# Patient Record
Sex: MALE | Race: BLACK OR AFRICAN AMERICAN | NOT HISPANIC OR LATINO
[De-identification: names, ages, dates, MRNs, and addresses within clinical notes are randomized per-mention and may not be internally consistent; named-entity substitution may affect disease eponyms.]

---

## 2017-02-28 ENCOUNTER — CHARTING TRANS (OUTPATIENT)
Dept: OTHER | Age: 57
End: 2017-02-28

## 2017-02-28 ASSESSMENT — PAIN SCALES - GENERAL: PAINLEVEL_OUTOF10: 0

## 2017-03-30 ENCOUNTER — LAB SERVICES (OUTPATIENT)
Dept: OTHER | Age: 57
End: 2017-03-30

## 2017-04-02 LAB
ALBUMIN SERPL-MCNC: 3.9 G/DL (ref 3.6–5.1)
ALBUMIN/GLOB SERPL: 1.2 (ref 1–2.4)
ALP SERPL-CCNC: 80 UNITS/L (ref 45–117)
ALT SERPL-CCNC: 63 UNITS/L
ANION GAP SERPL CALC-SCNC: 14 MMOL/L (ref 10–20)
APPEARANCE UR: CLEAR
AST SERPL-CCNC: 31 UNITS/L
BACTERIA #/AREA URNS HPF: NORMAL /HPF
BASOPHILS # BLD: 0.1 K/MCL (ref 0–0.3)
BASOPHILS NFR BLD: 1 %
BILIRUB SERPL-MCNC: 0.8 MG/DL (ref 0.2–1)
BILIRUB UR QL: NEGATIVE
BUN SERPL-MCNC: 12 MG/DL (ref 6–20)
BUN/CREAT SERPL: 14 (ref 7–25)
CALCIUM SERPL-MCNC: 9 MG/DL (ref 8.4–10.2)
CHLORIDE SERPL-SCNC: 107 MMOL/L (ref 98–107)
CHOLEST SERPL-MCNC: 145 MG/DL
CHOLEST/HDLC SERPL: 3.9
CK SERPL-CCNC: 253 UNITS/L (ref 39–308)
CO2 SERPL-SCNC: 25 MMOL/L (ref 21–32)
COLOR UR: YELLOW
CREAT SERPL-MCNC: 0.85 MG/DL (ref 0.67–1.17)
DIFFERENTIAL METHOD BLD: ABNORMAL
EOSINOPHIL # BLD: 0.3 K/MCL (ref 0.1–0.5)
EOSINOPHIL NFR BLD: 3 %
ERYTHROCYTE [DISTWIDTH] IN BLOOD: 14.2 % (ref 11–15)
GLOBULIN SER-MCNC: 3.3 G/DL (ref 2–4)
GLUCOSE SERPL-MCNC: 96 MG/DL (ref 65–99)
GLUCOSE UR-MCNC: NEGATIVE MG/DL
HBA1C MFR BLD: 6.4 % (ref 4.5–5.6)
HDLC SERPL-MCNC: 37 MG/DL
HEMATOCRIT: 42 % (ref 39–51)
HEMOGLOBIN: 14.1 G/DL (ref 13–17)
HYALINE CASTS #/AREA URNS LPF: NORMAL /LPF (ref 0–5)
KETONES UR-MCNC: NEGATIVE MG/DL
LDLC SERPL CALC-MCNC: 83 MG/DL
LENGTH OF FAST TIME PATIENT: ABNORMAL HRS
LENGTH OF FAST TIME PATIENT: NORMAL HRS
LYMPHOCYTES # BLD: 2.1 K/MCL (ref 1–4)
LYMPHOCYTES NFR BLD: 22 %
MEAN CORPUSCULAR HEMOGLOBIN: 30.9 PG (ref 26–34)
MEAN CORPUSCULAR HGB CONC: 33.6 G/DL (ref 32–36.5)
MEAN CORPUSCULAR VOLUME: 92.1 FL (ref 78–100)
MONOCYTES # BLD: 1 K/MCL (ref 0.3–0.9)
MONOCYTES NFR BLD: 10 %
MUCOUS THREADS URNS QL MICRO: PRESENT
NEUTROPHILS # BLD: 6.1 K/MCL (ref 1.8–7.7)
NEUTROPHILS NFR BLD: 64 %
NITRITE UR QL: NEGATIVE
NONHDLC SERPL-MCNC: 108 MG/DL
PH UR: 5 UNITS (ref 5–7)
PLATELET COUNT: 264 K/MCL (ref 140–450)
POTASSIUM SERPL-SCNC: 4.3 MMOL/L (ref 3.4–5.1)
PROT UR QL: NEGATIVE MG/DL
PSA SERPL-MCNC: 15.13 NG/ML
RBC #/AREA URNS HPF: NORMAL /HPF (ref 0–3)
RBC-URINE: NEGATIVE
RED CELL COUNT: 4.56 MIL/MCL (ref 4.5–5.9)
SODIUM SERPL-SCNC: 142 MMOL/L (ref 135–145)
SP GR UR: 1.02 (ref 1–1.03)
SPECIMEN SOURCE: NORMAL
SQUAMOUS #/AREA URNS HPF: NORMAL /HPF (ref 0–5)
TOTAL PROTEIN: 7.2 G/DL (ref 6.4–8.2)
TRIGL SERPL-MCNC: 125 MG/DL
TSH SERPL-ACNC: 1.01 MCUNITS/ML (ref 0.35–5)
URIC ACID: 6.1 MG/DL (ref 3.5–7.2)
UROBILINOGEN UR QL: 0.2 MG/DL (ref 0–1)
WBC #/AREA URNS HPF: NORMAL /HPF (ref 0–5)
WBC-URINE: NEGATIVE
WHITE BLOOD COUNT: 9.4 K/MCL (ref 4.2–11)

## 2017-04-05 ENCOUNTER — IMAGING SERVICES (OUTPATIENT)
Dept: OTHER | Age: 57
End: 2017-04-05

## 2017-04-05 ENCOUNTER — HOSPITAL (OUTPATIENT)
Dept: OTHER | Age: 57
End: 2017-04-05

## 2017-05-11 ENCOUNTER — CHARTING TRANS (OUTPATIENT)
Dept: OTHER | Age: 57
End: 2017-05-11

## 2017-05-11 ASSESSMENT — PAIN SCALES - GENERAL: PAINLEVEL_OUTOF10: 0

## 2017-07-17 ENCOUNTER — CHARTING TRANS (OUTPATIENT)
Dept: OTHER | Age: 57
End: 2017-07-17

## 2017-07-17 ASSESSMENT — PAIN SCALES - GENERAL: PAINLEVEL_OUTOF10: 0

## 2017-10-16 ENCOUNTER — CHARTING TRANS (OUTPATIENT)
Dept: OTHER | Age: 57
End: 2017-10-16

## 2017-10-16 ASSESSMENT — PAIN SCALES - GENERAL: PAINLEVEL_OUTOF10: 0

## 2017-10-26 ENCOUNTER — LAB SERVICES (OUTPATIENT)
Dept: OTHER | Age: 57
End: 2017-10-26

## 2017-11-06 LAB
ACE SERPL-CCNC: 46 U/L
ALBUMIN SERPL-MCNC: 3.9 G/DL (ref 3.6–5.1)
ALBUMIN/GLOB SERPL: 1.1 (ref 1–2.4)
ALP SERPL-CCNC: 94 UNITS/L (ref 45–117)
ALT SERPL-CCNC: 64 UNITS/L
ANA PAT SER IF-IMP: ABNORMAL
ANA SER QL IA: POSITIVE
ANA TITR SER IF: 320 1:NN
ANION GAP SERPL CALC-SCNC: 12 MMOL/L (ref 10–20)
APPEARANCE UR: CLEAR
AST SERPL-CCNC: 33 UNITS/L
BACTERIA #/AREA URNS HPF: NORMAL /HPF
BILIRUB SERPL-MCNC: 0.3 MG/DL (ref 0.2–1)
BILIRUB UR QL: NEGATIVE
BLANK1: NORMAL
BUN SERPL-MCNC: 10 MG/DL (ref 6–20)
BUN/CREAT SERPL: 11 (ref 7–25)
CALCIUM SERPL-MCNC: 9.6 MG/DL (ref 8.4–10.2)
CENTROMERE AB SER-ACNC: <0.2 AI (ref 0–0.9)
CHLORIDE SERPL-SCNC: 107 MMOL/L (ref 98–107)
CHOLEST SERPL-MCNC: 138 MG/DL
CHOLEST/HDLC SERPL: 3.3
CHROMATIN AB SERPL-ACNC: <0.2 AI (ref 0–0.9)
CK SERPL-CCNC: 255 UNITS/L (ref 39–308)
CO2 SERPL-SCNC: 28 MMOL/L (ref 21–32)
COLOR UR: YELLOW
CREAT SERPL-MCNC: 0.89 MG/DL (ref 0.67–1.17)
CRP SERPL-MCNC: <0.3 MG/DL
DATE OF ANALYSIS SPEC: NORMAL
DSDNA AB SER IA-ACNC: 10 IUNITS/ML
ENA JO1 IGG SER-ACNC: <0.2 AI (ref 0–0.9)
ENA RNP IGG SER IA-ACNC: 0.3 AI (ref 0–0.9)
ENA SCL70 IGG SER QL: <0.2 AI (ref 0–0.9)
ENA SM IGG SER IA-ACNC: <0.2 AI (ref 0–0.9)
ENA SM+RNP IGG SER IA-ACNC: <0.2 AI (ref 0–0.9)
ENA SS-A AB SER IA-ACNC: <0.2 AI (ref 0–0.9)
ENA SS-B IGG SER IA-ACNC: <0.2 AI (ref 0–0.9)
ERYTHROCYTE [SEDIMENTATION RATE] IN BLOOD BY WESTERGREN METHOD: 12 MM/HR (ref 0–20)
GLOBULIN SER-MCNC: 3.6 G/DL (ref 2–4)
GLUCOSE SERPL-MCNC: 99 MG/DL (ref 65–99)
GLUCOSE UR-MCNC: NEGATIVE MG/DL
HBA1C MFR BLD: 6.5 % (ref 4.5–5.6)
HDLC SERPL-MCNC: 42 MG/DL
HLA-B27 RELATED AG QL: NEGATIVE
HYALINE CASTS #/AREA URNS LPF: NORMAL /LPF (ref 0–5)
KETONES UR-MCNC: NEGATIVE MG/DL
LDLC SERPL CALC-MCNC: 69 MG/DL
LENGTH OF FAST TIME PATIENT: NORMAL HRS
LENGTH OF FAST TIME PATIENT: NORMAL HRS
MUCOUS THREADS URNS QL MICRO: PRESENT
NITRITE UR QL: NEGATIVE
NONHDLC SERPL-MCNC: 96 MG/DL
PERFORMING LAB MEDICAL DIRECTOR: NORMAL
PH UR: 5 UNITS (ref 5–7)
POTASSIUM SERPL-SCNC: 4.5 MMOL/L (ref 3.4–5.1)
PROT UR QL: NEGATIVE MG/DL
RBC #/AREA URNS HPF: NORMAL /HPF (ref 0–3)
RBC-URINE: NEGATIVE
RHEUMATOID FACT SER NEPH-ACNC: <10 UNITS/ML
RIBOSOMAL P IGG SER-ACNC: <0.2 AI (ref 0–0.9)
SERVICE CMNT-IMP: NORMAL
SODIUM SERPL-SCNC: 143 MMOL/L (ref 135–145)
SP GR UR: 1.01 (ref 1–1.03)
SPECIMEN SOURCE: NORMAL
SQUAMOUS #/AREA URNS HPF: NORMAL /HPF (ref 0–5)
TOTAL PROTEIN: 7.5 G/DL (ref 6.4–8.2)
TRIGL SERPL-MCNC: 133 MG/DL
TSH SERPL-ACNC: 1.09 MCUNITS/ML (ref 0.35–5)
URIC ACID: 6.6 MG/DL (ref 3.5–7.2)
UROBILINOGEN UR QL: 0.2 MG/DL (ref 0–1)
WBC #/AREA URNS HPF: NORMAL /HPF (ref 0–5)
WBC-URINE: NEGATIVE

## 2017-12-07 ENCOUNTER — CHARTING TRANS (OUTPATIENT)
Dept: OTHER | Age: 57
End: 2017-12-07

## 2017-12-07 ASSESSMENT — PAIN SCALES - GENERAL: PAINLEVEL_OUTOF10: 0

## 2018-01-24 ENCOUNTER — HOSPITAL (OUTPATIENT)
Dept: OTHER | Age: 58
End: 2018-01-24

## 2018-02-06 ENCOUNTER — CHARTING TRANS (OUTPATIENT)
Dept: OTHER | Age: 58
End: 2018-02-06

## 2018-02-06 ENCOUNTER — HOSPITAL (OUTPATIENT)
Dept: OTHER | Age: 58
End: 2018-02-06
Attending: INTERNAL MEDICINE

## 2018-02-16 LAB — COLONOSCOPY STUDY: NORMAL

## 2018-06-26 ENCOUNTER — CHARTING TRANS (OUTPATIENT)
Dept: OTHER | Age: 58
End: 2018-06-26

## 2018-06-26 ASSESSMENT — PAIN SCALES - GENERAL: PAINLEVEL_OUTOF10: 0

## 2018-10-08 ENCOUNTER — CHARTING TRANS (OUTPATIENT)
Dept: OTHER | Age: 58
End: 2018-10-08

## 2018-10-08 ASSESSMENT — PAIN SCALES - GENERAL: PAINLEVEL_OUTOF10: 0

## 2018-10-31 VITALS
RESPIRATION RATE: 16 BRPM | WEIGHT: 262 LBS | BODY MASS INDEX: 38.8 KG/M2 | TEMPERATURE: 97.7 F | HEART RATE: 85 BPM | HEIGHT: 69 IN | OXYGEN SATURATION: 96 % | SYSTOLIC BLOOD PRESSURE: 142 MMHG | DIASTOLIC BLOOD PRESSURE: 91 MMHG

## 2018-11-02 VITALS
SYSTOLIC BLOOD PRESSURE: 150 MMHG | HEART RATE: 84 BPM | DIASTOLIC BLOOD PRESSURE: 100 MMHG | BODY MASS INDEX: 39.4 KG/M2 | TEMPERATURE: 96.8 F | HEIGHT: 69 IN | OXYGEN SATURATION: 95 % | WEIGHT: 265.99 LBS | RESPIRATION RATE: 16 BRPM

## 2018-11-02 VITALS
RESPIRATION RATE: 16 BRPM | HEIGHT: 69 IN | SYSTOLIC BLOOD PRESSURE: 166 MMHG | OXYGEN SATURATION: 100 % | BODY MASS INDEX: 38.89 KG/M2 | WEIGHT: 262.57 LBS | TEMPERATURE: 97.3 F | DIASTOLIC BLOOD PRESSURE: 96 MMHG | HEART RATE: 85 BPM

## 2018-11-03 VITALS
HEART RATE: 84 BPM | TEMPERATURE: 98 F | HEIGHT: 69 IN | DIASTOLIC BLOOD PRESSURE: 94 MMHG | WEIGHT: 259.4 LBS | SYSTOLIC BLOOD PRESSURE: 159 MMHG | RESPIRATION RATE: 16 BRPM | BODY MASS INDEX: 38.42 KG/M2 | OXYGEN SATURATION: 98 %

## 2018-11-03 VITALS
BODY MASS INDEX: 38.66 KG/M2 | DIASTOLIC BLOOD PRESSURE: 70 MMHG | HEIGHT: 69 IN | HEART RATE: 76 BPM | TEMPERATURE: 96.8 F | RESPIRATION RATE: 16 BRPM | SYSTOLIC BLOOD PRESSURE: 120 MMHG | WEIGHT: 261.01 LBS | OXYGEN SATURATION: 95 %

## 2018-11-05 VITALS
DIASTOLIC BLOOD PRESSURE: 82 MMHG | TEMPERATURE: 96.2 F | OXYGEN SATURATION: 98 % | BODY MASS INDEX: 38.66 KG/M2 | WEIGHT: 261.01 LBS | HEART RATE: 72 BPM | HEIGHT: 69 IN | RESPIRATION RATE: 18 BRPM | SYSTOLIC BLOOD PRESSURE: 120 MMHG

## 2018-11-21 ENCOUNTER — LAB SERVICES (OUTPATIENT)
Dept: OTHER | Age: 58
End: 2018-11-21

## 2018-11-22 LAB
ALBUMIN SERPL-MCNC: 3.9 G/DL (ref 3.6–5.1)
ALBUMIN/GLOB SERPL: 1.2 {RATIO} (ref 1–2.4)
ALP SERPL-CCNC: 99 UNITS/L (ref 45–117)
ALT SERPL-CCNC: 80 UNITS/L
ANION GAP SERPL CALC-SCNC: 13 MMOL/L (ref 10–20)
AST SERPL-CCNC: 40 UNITS/L
BILIRUB SERPL-MCNC: 0.5 MG/DL (ref 0.2–1)
BUN SERPL-MCNC: 10 MG/DL (ref 6–20)
BUN/CREAT SERPL: 12 (ref 7–25)
CALCIUM SERPL-MCNC: 8.7 MG/DL (ref 8.4–10.2)
CHLORIDE SERPL-SCNC: 107 MMOL/L (ref 98–107)
CHOLEST SERPL-MCNC: 112 MG/DL
CHOLEST/HDLC SERPL: 3.5 {RATIO}
CO2 SERPL-SCNC: 28 MMOL/L (ref 21–32)
CREAT SERPL-MCNC: 0.8 MG/DL (ref 0.67–1.17)
GLOBULIN SER-MCNC: 3.3 G/DL (ref 2–4)
GLUCOSE SERPL-MCNC: 105 MG/DL (ref 65–99)
HBA1C MFR BLD: 6.6 % (ref 4.5–5.6)
HDLC SERPL-MCNC: 32 MG/DL
LDLC SERPL CALC-MCNC: 56 MG/DL
LENGTH OF FAST TIME PATIENT: ABNORMAL HRS
LENGTH OF FAST TIME PATIENT: ABNORMAL HRS
NONHDLC SERPL-MCNC: 80 MG/DL
POTASSIUM SERPL-SCNC: 4.1 MMOL/L (ref 3.4–5.1)
PROT SERPL-MCNC: 7.2 G/DL (ref 6.4–8.2)
PSA FREE MFR SERPL: 2.98 %
PSA FREE SERPL-MCNC: 0.52 NG/ML
PSA SERPL-MCNC: 17.3 NG/ML
SODIUM SERPL-SCNC: 144 MMOL/L (ref 135–145)
TRIGL SERPL-MCNC: 122 MG/DL
TSH SERPL-ACNC: 0.49 MCUNITS/ML (ref 0.35–5)

## 2018-11-27 VITALS
WEIGHT: 261.23 LBS | SYSTOLIC BLOOD PRESSURE: 140 MMHG | BODY MASS INDEX: 38.69 KG/M2 | DIASTOLIC BLOOD PRESSURE: 87 MMHG | TEMPERATURE: 98.4 F | HEART RATE: 85 BPM | HEIGHT: 69 IN | OXYGEN SATURATION: 97 % | RESPIRATION RATE: 18 BRPM

## 2018-12-05 ENCOUNTER — TELEPHONE (OUTPATIENT)
Dept: SCHEDULING | Age: 58
End: 2018-12-05

## 2018-12-14 DIAGNOSIS — I10 ESSENTIAL HYPERTENSION: Primary | ICD-10-CM

## 2018-12-14 DIAGNOSIS — Z72.0 TOBACCO USE: ICD-10-CM

## 2018-12-18 RX ORDER — TRIAMTERENE AND HYDROCHLOROTHIAZIDE 37.5; 25 MG/1; MG/1
1 CAPSULE ORAL DAILY
Qty: 90 CAPSULE | Refills: 3 | Status: SHIPPED | OUTPATIENT
Start: 2018-12-18 | End: 2019-12-18

## 2018-12-18 RX ORDER — VARENICLINE TARTRATE 1 MG/1
TABLET, FILM COATED ORAL EVERY 12 HOURS
COMMUNITY
Start: 2018-10-08 | End: 2018-12-18 | Stop reason: SDUPTHER

## 2018-12-18 RX ORDER — VARENICLINE TARTRATE 1 MG/1
1 TABLET, FILM COATED ORAL EVERY 12 HOURS
Qty: 56 TABLET | Refills: 11 | Status: SHIPPED | OUTPATIENT
Start: 2019-01-18 | End: 2020-01-18

## 2018-12-18 RX ORDER — VARENICLINE TARTRATE 1 MG/1
1 TABLET, FILM COATED ORAL EVERY 12 HOURS
Qty: 56 TABLET | Refills: 11 | Status: SHIPPED | OUTPATIENT
Start: 2019-01-18 | End: 2018-12-18 | Stop reason: SDUPTHER

## 2018-12-18 RX ORDER — TRIAMTERENE AND HYDROCHLOROTHIAZIDE 37.5; 25 MG/1; MG/1
CAPSULE ORAL
COMMUNITY
Start: 2018-06-20 | End: 2018-12-18 | Stop reason: SDUPTHER

## 2018-12-24 ENCOUNTER — TELEPHONE (OUTPATIENT)
Dept: SCHEDULING | Age: 58
End: 2018-12-24

## 2018-12-24 DIAGNOSIS — I10 ESSENTIAL HYPERTENSION: Primary | ICD-10-CM

## 2018-12-24 DIAGNOSIS — I10 HYPERTENSION, UNSPECIFIED TYPE: ICD-10-CM

## 2018-12-24 RX ORDER — ATENOLOL 100 MG/1
1 TABLET ORAL DAILY
COMMUNITY
Start: 2018-03-15 | End: 2019-02-07 | Stop reason: SDUPTHER

## 2019-01-01 ENCOUNTER — EXTERNAL RECORD (OUTPATIENT)
Dept: HEALTH INFORMATION MANAGEMENT | Facility: OTHER | Age: 59
End: 2019-01-01

## 2019-02-01 ENCOUNTER — TELEPHONE (OUTPATIENT)
Dept: SCHEDULING | Age: 59
End: 2019-02-01

## 2019-02-04 ENCOUNTER — HOSPITAL (OUTPATIENT)
Dept: OTHER | Age: 59
End: 2019-02-04

## 2019-02-05 RX ORDER — CITALOPRAM 40 MG/1
TABLET ORAL
COMMUNITY
Start: 2018-03-15 | End: 2021-01-11 | Stop reason: DRUGHIGH

## 2019-02-05 RX ORDER — AMLODIPINE BESYLATE 10 MG/1
TABLET ORAL
COMMUNITY
Start: 2018-06-30 | End: 2019-04-18 | Stop reason: SDUPTHER

## 2019-02-05 RX ORDER — LEVOFLOXACIN 750 MG/1
TABLET, FILM COATED ORAL
COMMUNITY
Start: 2018-10-08 | End: 2019-02-07 | Stop reason: ALTCHOICE

## 2019-02-05 RX ORDER — LORATADINE 10 MG/1
TABLET ORAL
COMMUNITY
Start: 2018-10-08 | End: 2019-10-08

## 2019-02-05 RX ORDER — ATORVASTATIN CALCIUM 80 MG/1
TABLET, FILM COATED ORAL
COMMUNITY
Start: 2018-06-30 | End: 2019-04-18 | Stop reason: SDUPTHER

## 2019-02-05 RX ORDER — MONTELUKAST SODIUM 10 MG/1
TABLET ORAL
COMMUNITY
Start: 2018-10-08 | End: 2019-10-08

## 2019-02-05 RX ORDER — NICOTINE 21 MG/24HR
PATCH, TRANSDERMAL 24 HOURS TRANSDERMAL
COMMUNITY
Start: 2018-10-08 | End: 2019-10-08

## 2019-02-05 RX ORDER — PREDNISONE 50 MG/1
TABLET ORAL
COMMUNITY
Start: 2018-10-08 | End: 2019-02-07 | Stop reason: SDUPTHER

## 2019-02-05 RX ORDER — FLUTICASONE PROPIONATE 50 MCG
SPRAY, SUSPENSION (ML) NASAL
COMMUNITY
Start: 2018-10-08 | End: 2019-10-08

## 2019-02-05 RX ORDER — TAMSULOSIN HYDROCHLORIDE 0.4 MG/1
CAPSULE ORAL
COMMUNITY
Start: 2018-10-08 | End: 2019-09-27 | Stop reason: SDUPTHER

## 2019-02-05 RX ORDER — QUETIAPINE FUMARATE 25 MG/1
TABLET, FILM COATED ORAL
COMMUNITY
Start: 2018-10-08 | End: 2019-10-08

## 2019-02-06 ENCOUNTER — TELEPHONE (OUTPATIENT)
Dept: SCHEDULING | Age: 59
End: 2019-02-06

## 2019-02-07 ENCOUNTER — OFFICE VISIT (OUTPATIENT)
Dept: FAMILY MEDICINE | Age: 59
End: 2019-02-07

## 2019-02-07 DIAGNOSIS — G89.29 CHRONIC PAIN OF LEFT KNEE: ICD-10-CM

## 2019-02-07 DIAGNOSIS — I10 ESSENTIAL HYPERTENSION: Primary | ICD-10-CM

## 2019-02-07 DIAGNOSIS — Z72.0 TOBACCO USE: ICD-10-CM

## 2019-02-07 DIAGNOSIS — C61 PROSTATE CANCER (CMD): ICD-10-CM

## 2019-02-07 DIAGNOSIS — J01.91 ACUTE RECURRENT SINUSITIS, UNSPECIFIED LOCATION: ICD-10-CM

## 2019-02-07 DIAGNOSIS — J32.9 CHRONIC SINUSITIS, UNSPECIFIED LOCATION: ICD-10-CM

## 2019-02-07 DIAGNOSIS — M25.562 CHRONIC PAIN OF LEFT KNEE: ICD-10-CM

## 2019-02-07 PROBLEM — R35.0 URINARY FREQUENCY: Status: ACTIVE | Noted: 2018-06-26

## 2019-02-07 PROBLEM — K63.5 BENIGN COLON POLYP: Status: ACTIVE | Noted: 2017-02-28

## 2019-02-07 PROBLEM — B36.0 TINEA VERSICOLOR: Status: ACTIVE | Noted: 2017-07-18

## 2019-02-07 PROBLEM — R73.09 ELEVATED HEMOGLOBIN A1C: Status: ACTIVE | Noted: 2017-07-17

## 2019-02-07 PROBLEM — B96.89 SINUSITIS, BACTERIAL: Status: ACTIVE | Noted: 2018-10-08

## 2019-02-07 PROBLEM — F10.20 ALCOHOL DEPENDENCE (CMD): Status: ACTIVE | Noted: 2017-12-07

## 2019-02-07 PROBLEM — F32.9 MAJOR DEPRESSIVE DISORDER WITH CURRENT ACTIVE EPISODE: Status: ACTIVE | Noted: 2017-07-18

## 2019-02-07 PROCEDURE — 3077F SYST BP >= 140 MM HG: CPT | Performed by: FAMILY MEDICINE

## 2019-02-07 PROCEDURE — 20610 DRAIN/INJ JOINT/BURSA W/O US: CPT | Performed by: FAMILY MEDICINE

## 2019-02-07 PROCEDURE — 3080F DIAST BP >= 90 MM HG: CPT | Performed by: FAMILY MEDICINE

## 2019-02-07 PROCEDURE — 99214 OFFICE O/P EST MOD 30 MIN: CPT | Performed by: FAMILY MEDICINE

## 2019-02-07 RX ORDER — PREDNISONE 50 MG/1
50 TABLET ORAL DAILY
Qty: 5 TABLET | Refills: 0 | Status: SHIPPED | OUTPATIENT
Start: 2019-02-07 | End: 2020-02-07

## 2019-02-07 RX ORDER — ATENOLOL 100 MG/1
100 TABLET ORAL DAILY
Qty: 90 TABLET | Refills: 3 | Status: SHIPPED | OUTPATIENT
Start: 2019-02-07 | End: 2020-02-07

## 2019-02-07 RX ORDER — GUAIFENESIN 600 MG/1
600 TABLET, EXTENDED RELEASE ORAL 2 TIMES DAILY
Qty: 30 TABLET | Refills: 0 | Status: SHIPPED | OUTPATIENT
Start: 2019-02-07 | End: 2021-12-02 | Stop reason: ALTCHOICE

## 2019-02-07 RX ORDER — TRIAMCINOLONE ACETONIDE 40 MG/ML
40 INJECTION, SUSPENSION INTRA-ARTICULAR; INTRAMUSCULAR ONCE
Status: COMPLETED | OUTPATIENT
Start: 2019-02-07 | End: 2019-02-07

## 2019-02-07 RX ORDER — ALBUTEROL SULFATE 90 UG/1
1-2 AEROSOL, METERED RESPIRATORY (INHALATION)
COMMUNITY
Start: 2018-09-05 | End: 2021-01-11 | Stop reason: ALTCHOICE

## 2019-02-07 RX ADMIN — TRIAMCINOLONE ACETONIDE 40 MG: 40 INJECTION, SUSPENSION INTRA-ARTICULAR; INTRAMUSCULAR at 18:46

## 2019-02-07 SDOH — HEALTH STABILITY: MENTAL HEALTH
STRESS IS WHEN SOMEONE FEELS TENSE, NERVOUS, ANXIOUS, OR CAN'T SLEEP AT NIGHT BECAUSE THEIR MIND IS TROUBLED. HOW STRESSED ARE YOU?: NOT AT ALL

## 2019-02-07 SDOH — HEALTH STABILITY: MENTAL HEALTH: HOW OFTEN DO YOU HAVE A DRINK CONTAINING ALCOHOL?: NEVER

## 2019-02-07 SDOH — HEALTH STABILITY: PHYSICAL HEALTH: ON AVERAGE, HOW MANY DAYS PER WEEK DO YOU ENGAGE IN MODERATE TO STRENUOUS EXERCISE (LIKE A BRISK WALK)?: 3 DAYS

## 2019-02-07 SDOH — HEALTH STABILITY: PHYSICAL HEALTH: ON AVERAGE, HOW MANY MINUTES DO YOU ENGAGE IN EXERCISE AT THIS LEVEL?: 90 MIN

## 2019-02-07 ASSESSMENT — ENCOUNTER SYMPTOMS
SEIZURES: 0
PHOTOPHOBIA: 0
ANAL BLEEDING: 0
CONSTIPATION: 0
BRUISES/BLEEDS EASILY: 0
LIGHT-HEADEDNESS: 1
HEADACHES: 1
FEVER: 0
AGITATION: 0
NAUSEA: 0
SLEEP DISTURBANCE: 0
ABDOMINAL DISTENTION: 0
SINUS PRESSURE: 1
APPETITE CHANGE: 0
SINUS PAIN: 1
NERVOUS/ANXIOUS: 0
COUGH: 1
DIARRHEA: 0
ADENOPATHY: 0
TREMORS: 0
BACK PAIN: 0
ABDOMINAL PAIN: 0
WHEEZING: 0
BLOOD IN STOOL: 0
SHORTNESS OF BREATH: 0
WEAKNESS: 0
ACTIVITY CHANGE: 0
RHINORRHEA: 1
DIZZINESS: 1
EYE DISCHARGE: 0
FATIGUE: 1
SORE THROAT: 1
FACIAL SWELLING: 0
WOUND: 0

## 2019-02-07 ASSESSMENT — PATIENT HEALTH QUESTIONNAIRE - PHQ9
2. FEELING DOWN, DEPRESSED OR HOPELESS: NOT AT ALL
SUM OF ALL RESPONSES TO PHQ9 QUESTIONS 1 AND 2: 0
1. LITTLE INTEREST OR PLEASURE IN DOING THINGS: NOT AT ALL
SUM OF ALL RESPONSES TO PHQ9 QUESTIONS 1 AND 2: 0

## 2019-02-07 ASSESSMENT — PAIN SCALES - GENERAL: PAINLEVEL: 7-8

## 2019-04-03 RX ORDER — CITALOPRAM 40 MG/1
TABLET ORAL
Qty: 30 TABLET | Refills: 0 | Status: SHIPPED | OUTPATIENT
Start: 2019-04-03 | End: 2019-05-06 | Stop reason: SDUPTHER

## 2019-04-18 RX ORDER — AMLODIPINE BESYLATE 10 MG/1
TABLET ORAL
Qty: 90 TABLET | Refills: 2 | Status: SHIPPED | OUTPATIENT
Start: 2019-04-18 | End: 2020-02-09 | Stop reason: SDUPTHER

## 2019-04-18 RX ORDER — ATORVASTATIN CALCIUM 80 MG/1
TABLET, FILM COATED ORAL
Qty: 90 TABLET | Refills: 2 | Status: SHIPPED | OUTPATIENT
Start: 2019-04-18 | End: 2019-12-25 | Stop reason: SDUPTHER

## 2019-05-08 RX ORDER — CITALOPRAM 40 MG/1
TABLET ORAL
Qty: 30 TABLET | Refills: 0 | Status: SHIPPED | OUTPATIENT
Start: 2019-05-08 | End: 2020-02-13 | Stop reason: SDUPTHER

## 2019-05-14 RX ORDER — CITALOPRAM 40 MG/1
TABLET ORAL
Qty: 30 TABLET | Refills: 6 | Status: SHIPPED | OUTPATIENT
Start: 2019-05-14 | End: 2021-01-11 | Stop reason: DRUGHIGH

## 2019-06-10 ENCOUNTER — TELEPHONE (OUTPATIENT)
Dept: SCHEDULING | Age: 59
End: 2019-06-10

## 2019-06-12 ENCOUNTER — OFFICE VISIT (OUTPATIENT)
Dept: FAMILY MEDICINE | Age: 59
End: 2019-06-12

## 2019-06-12 DIAGNOSIS — M25.562 CHRONIC PAIN OF LEFT KNEE: Primary | ICD-10-CM

## 2019-06-12 DIAGNOSIS — C61 PROSTATE CANCER (CMD): ICD-10-CM

## 2019-06-12 DIAGNOSIS — B35.1 TOENAIL FUNGUS: ICD-10-CM

## 2019-06-12 DIAGNOSIS — L60.0 IGTN (INGROWING TOE NAIL): ICD-10-CM

## 2019-06-12 DIAGNOSIS — R22.42 MASS OF LEFT FOOT: ICD-10-CM

## 2019-06-12 DIAGNOSIS — G89.29 CHRONIC PAIN OF LEFT KNEE: Primary | ICD-10-CM

## 2019-06-12 DIAGNOSIS — R74.8 ELEVATED LIVER ENZYMES: ICD-10-CM

## 2019-06-12 DIAGNOSIS — I10 ESSENTIAL HYPERTENSION: ICD-10-CM

## 2019-06-12 PROCEDURE — 3075F SYST BP GE 130 - 139MM HG: CPT | Performed by: FAMILY MEDICINE

## 2019-06-12 PROCEDURE — 99214 OFFICE O/P EST MOD 30 MIN: CPT | Performed by: FAMILY MEDICINE

## 2019-06-12 PROCEDURE — 3079F DIAST BP 80-89 MM HG: CPT | Performed by: FAMILY MEDICINE

## 2019-06-12 RX ORDER — CLOTRIMAZOLE AND BETAMETHASONE DIPROPIONATE 10; .64 MG/G; MG/G
CREAM TOPICAL 2 TIMES DAILY
Qty: 30 G | Refills: 0 | Status: SHIPPED | OUTPATIENT
Start: 2019-06-12 | End: 2020-06-24

## 2019-06-12 RX ORDER — LOSARTAN POTASSIUM 50 MG/1
50 TABLET ORAL DAILY
Qty: 90 TABLET | Refills: 3 | Status: SHIPPED | OUTPATIENT
Start: 2019-06-12 | End: 2019-10-14 | Stop reason: DRUGHIGH

## 2019-06-12 RX ORDER — TERBINAFINE HYDROCHLORIDE 250 MG/1
250 TABLET ORAL DAILY
Qty: 84 TABLET | Refills: 0 | Status: SHIPPED | OUTPATIENT
Start: 2019-06-12 | End: 2019-12-02 | Stop reason: SDUPTHER

## 2019-06-12 RX ORDER — CYCLOBENZAPRINE HCL 5 MG
5 TABLET ORAL
Qty: 90 TABLET | Refills: 3 | Status: SHIPPED | OUTPATIENT
Start: 2019-06-12 | End: 2021-12-02 | Stop reason: ALTCHOICE

## 2019-06-12 SDOH — HEALTH STABILITY: MENTAL HEALTH: HOW OFTEN DO YOU HAVE A DRINK CONTAINING ALCOHOL?: NEVER

## 2019-06-12 ASSESSMENT — PAIN SCALES - GENERAL: PAINLEVEL: 7-8

## 2019-06-13 RX ORDER — ABIRATERONE 500 MG/1
1000 TABLET ORAL DAILY
Qty: 30 TABLET | Refills: 11 | Status: SHIPPED | COMMUNITY
Start: 2019-06-13 | End: 2021-12-02 | Stop reason: ALTCHOICE

## 2019-09-23 ENCOUNTER — TELEPHONE (OUTPATIENT)
Dept: SCHEDULING | Age: 59
End: 2019-09-23

## 2019-09-23 ENCOUNTER — TELEPHONE (OUTPATIENT)
Dept: INTERNAL MEDICINE | Age: 59
End: 2019-09-23

## 2019-09-25 ENCOUNTER — OFFICE VISIT (OUTPATIENT)
Dept: INTERNAL MEDICINE | Age: 59
End: 2019-09-25

## 2019-09-25 ENCOUNTER — LAB SERVICES (OUTPATIENT)
Dept: LAB | Age: 59
End: 2019-09-25

## 2019-09-25 DIAGNOSIS — C61 PROSTATE CANCER (CMD): ICD-10-CM

## 2019-09-25 DIAGNOSIS — R74.8 ELEVATED LIVER ENZYMES: ICD-10-CM

## 2019-09-25 DIAGNOSIS — C61 PROSTATE CANCER (CMD): Primary | ICD-10-CM

## 2019-09-25 LAB
ALBUMIN SERPL-MCNC: 3.7 G/DL (ref 3.6–5.1)
ALBUMIN/GLOB SERPL: 1.1 {RATIO} (ref 1–2.4)
ALP SERPL-CCNC: 100 UNITS/L (ref 45–117)
ALT SERPL-CCNC: 49 UNITS/L
ANION GAP SERPL CALC-SCNC: 12 MMOL/L (ref 10–20)
AST SERPL-CCNC: 28 UNITS/L
BILIRUB SERPL-MCNC: 0.2 MG/DL (ref 0.2–1)
BUN SERPL-MCNC: 14 MG/DL (ref 6–20)
BUN/CREAT SERPL: 18 (ref 7–25)
CALCIUM SERPL-MCNC: 9.3 MG/DL (ref 8.4–10.2)
CHLORIDE SERPL-SCNC: 110 MMOL/L (ref 98–107)
CO2 SERPL-SCNC: 28 MMOL/L (ref 21–32)
CREAT SERPL-MCNC: 0.8 MG/DL (ref 0.67–1.17)
FASTING STATUS PATIENT QL REPORTED: ABNORMAL HRS
GLOBULIN SER-MCNC: 3.5 G/DL (ref 2–4)
GLUCOSE SERPL-MCNC: 96 MG/DL (ref 65–99)
POTASSIUM SERPL-SCNC: 4.1 MMOL/L (ref 3.4–5.1)
PROT SERPL-MCNC: 7.2 G/DL (ref 6.4–8.2)
PSA SERPL-MCNC: <0.01 NG/ML
SODIUM SERPL-SCNC: 146 MMOL/L (ref 135–145)

## 2019-09-25 PROCEDURE — 80053 COMPREHEN METABOLIC PANEL: CPT | Performed by: FAMILY MEDICINE

## 2019-09-25 PROCEDURE — X1094 NO CHARGE VISIT: HCPCS

## 2019-09-25 PROCEDURE — 84153 ASSAY OF PSA TOTAL: CPT | Performed by: FAMILY MEDICINE

## 2019-09-25 PROCEDURE — 36415 COLL VENOUS BLD VENIPUNCTURE: CPT

## 2019-09-27 ENCOUNTER — IMAGING SERVICES (OUTPATIENT)
Dept: GENERAL RADIOLOGY | Age: 59
End: 2019-09-27

## 2019-09-27 ENCOUNTER — WALK IN (OUTPATIENT)
Dept: URGENT CARE | Age: 59
End: 2019-09-27

## 2019-09-27 DIAGNOSIS — J18.9 PNEUMONIA DUE TO INFECTIOUS ORGANISM, UNSPECIFIED LATERALITY, UNSPECIFIED PART OF LUNG: Primary | ICD-10-CM

## 2019-09-27 DIAGNOSIS — Z91.09 ENVIRONMENTAL ALLERGIES: ICD-10-CM

## 2019-09-27 DIAGNOSIS — R05.9 COUGH: ICD-10-CM

## 2019-09-27 PROCEDURE — 99203 OFFICE O/P NEW LOW 30 MIN: CPT | Performed by: NURSE PRACTITIONER

## 2019-09-27 PROCEDURE — 3077F SYST BP >= 140 MM HG: CPT | Performed by: NURSE PRACTITIONER

## 2019-09-27 PROCEDURE — 71046 X-RAY EXAM CHEST 2 VIEWS: CPT | Performed by: EMERGENCY MEDICINE

## 2019-09-27 RX ORDER — AZITHROMYCIN 250 MG/1
TABLET, FILM COATED ORAL
Qty: 6 TABLET | Refills: 0 | Status: SHIPPED | OUTPATIENT
Start: 2019-09-27 | End: 2021-01-11 | Stop reason: ALTCHOICE

## 2019-09-27 ASSESSMENT — PAIN SCALES - GENERAL: PAINLEVEL: 5-6

## 2019-09-28 RX ORDER — TAMSULOSIN HYDROCHLORIDE 0.4 MG/1
CAPSULE ORAL
Qty: 30 CAPSULE | Refills: 10 | Status: SHIPPED | OUTPATIENT
Start: 2019-09-28 | End: 2020-11-01 | Stop reason: SDUPTHER

## 2019-10-14 ENCOUNTER — APPOINTMENT (OUTPATIENT)
Dept: INTERNAL MEDICINE | Age: 59
End: 2019-10-14

## 2019-10-14 ENCOUNTER — OFFICE VISIT (OUTPATIENT)
Dept: INTERNAL MEDICINE | Age: 59
End: 2019-10-14

## 2019-10-14 ENCOUNTER — TELEPHONE (OUTPATIENT)
Dept: SCHEDULING | Age: 59
End: 2019-10-14

## 2019-10-14 VITALS
HEIGHT: 68 IN | DIASTOLIC BLOOD PRESSURE: 94 MMHG | OXYGEN SATURATION: 97 % | WEIGHT: 264.55 LBS | BODY MASS INDEX: 40.09 KG/M2 | RESPIRATION RATE: 16 BRPM | SYSTOLIC BLOOD PRESSURE: 159 MMHG | HEART RATE: 78 BPM

## 2019-10-14 DIAGNOSIS — J44.9 CHRONIC OBSTRUCTIVE PULMONARY DISEASE, UNSPECIFIED COPD TYPE (CMD): Primary | ICD-10-CM

## 2019-10-14 DIAGNOSIS — I10 ESSENTIAL HYPERTENSION: ICD-10-CM

## 2019-10-14 DIAGNOSIS — R53.83 OTHER FATIGUE: ICD-10-CM

## 2019-10-14 PROCEDURE — 99214 OFFICE O/P EST MOD 30 MIN: CPT | Performed by: INTERNAL MEDICINE

## 2019-10-14 PROCEDURE — 3080F DIAST BP >= 90 MM HG: CPT | Performed by: INTERNAL MEDICINE

## 2019-10-14 PROCEDURE — 3077F SYST BP >= 140 MM HG: CPT | Performed by: INTERNAL MEDICINE

## 2019-10-14 RX ORDER — DOXYCYCLINE HYCLATE 100 MG/1
100 CAPSULE ORAL 2 TIMES DAILY
Qty: 14 CAPSULE | Refills: 0 | Status: SHIPPED | OUTPATIENT
Start: 2019-10-14 | End: 2019-10-21

## 2019-10-14 RX ORDER — LOSARTAN POTASSIUM 50 MG/1
75 TABLET ORAL DAILY
Qty: 90 TABLET | Refills: 3 | Status: SHIPPED | OUTPATIENT
Start: 2019-10-14 | End: 2020-02-12 | Stop reason: SDUPTHER

## 2019-10-14 ASSESSMENT — ENCOUNTER SYMPTOMS
FATIGUE: 1
RESPIRATORY NEGATIVE: 1
GASTROINTESTINAL NEGATIVE: 1

## 2019-10-25 RX ORDER — MONTELUKAST SODIUM 10 MG/1
TABLET ORAL
Qty: 30 TABLET | Refills: 26 | Status: SHIPPED | OUTPATIENT
Start: 2019-10-25 | End: 2021-01-11 | Stop reason: SDUPTHER

## 2019-10-25 RX ORDER — QUETIAPINE FUMARATE 25 MG/1
TABLET, FILM COATED ORAL
Qty: 30 TABLET | Refills: 71 | Status: SHIPPED | OUTPATIENT
Start: 2019-10-25 | End: 2021-01-11 | Stop reason: SDUPTHER

## 2019-12-02 DIAGNOSIS — B35.1 TOENAIL FUNGUS: ICD-10-CM

## 2019-12-03 RX ORDER — TERBINAFINE HYDROCHLORIDE 250 MG/1
TABLET ORAL
Qty: 30 TABLET | Refills: 2 | Status: SHIPPED | OUTPATIENT
Start: 2019-12-03 | End: 2021-01-11 | Stop reason: ALTCHOICE

## 2019-12-20 ENCOUNTER — OFFICE VISIT (OUTPATIENT)
Dept: URGENT CARE | Facility: URGENT CARE | Age: 59
End: 2019-12-20

## 2019-12-20 VITALS
BODY MASS INDEX: 39.11 KG/M2 | WEIGHT: 261 LBS | OXYGEN SATURATION: 95 % | SYSTOLIC BLOOD PRESSURE: 151 MMHG | DIASTOLIC BLOOD PRESSURE: 93 MMHG | HEART RATE: 100 BPM | TEMPERATURE: 98.3 F

## 2019-12-20 DIAGNOSIS — C61 PROSTATE CANCER (H): ICD-10-CM

## 2019-12-20 DIAGNOSIS — H66.002 ACUTE SUPPURATIVE OTITIS MEDIA OF LEFT EAR WITHOUT SPONTANEOUS RUPTURE OF TYMPANIC MEMBRANE, RECURRENCE NOT SPECIFIED: Primary | ICD-10-CM

## 2019-12-20 DIAGNOSIS — J01.90 ACUTE SINUSITIS WITH SYMPTOMS > 10 DAYS: ICD-10-CM

## 2019-12-20 PROCEDURE — 99203 OFFICE O/P NEW LOW 30 MIN: CPT | Performed by: PHYSICIAN ASSISTANT

## 2019-12-20 RX ORDER — LOSARTAN POTASSIUM 50 MG/1
50 TABLET ORAL DAILY
COMMUNITY
Start: 2019-11-05

## 2019-12-20 RX ORDER — CEFUROXIME AXETIL 500 MG/1
500 TABLET ORAL 2 TIMES DAILY
Qty: 20 TABLET | Refills: 0 | Status: SHIPPED | OUTPATIENT
Start: 2019-12-20 | End: 2019-12-30

## 2019-12-20 RX ORDER — AMLODIPINE BESYLATE 10 MG/1
10 TABLET ORAL DAILY
COMMUNITY
Start: 2019-12-02

## 2019-12-20 RX ORDER — QUETIAPINE FUMARATE 25 MG/1
TABLET, FILM COATED ORAL
COMMUNITY
Start: 2019-12-02

## 2019-12-20 RX ORDER — TAMSULOSIN HYDROCHLORIDE 0.4 MG/1
CAPSULE ORAL
COMMUNITY
Start: 2019-12-05

## 2019-12-20 NOTE — PROGRESS NOTES
S: 59-year-old male is here for left-sided headache, cough, decreased hearing out of the left ear with pain for about 4 days now.  Also some left-sided sore throat.  Nauseated.  No fever or diarrhea.  Will cough so hard that he almost will vomit.  Has lots of nasal congestion and postnasal drip.  No neck pain.  Has felt a little lightheaded.  He is currently going through hormone reversal therapy and treatment for prostate cancer.  He has completed his radiation.  Has noted some of his upper teeth have been aching.      Allergies   Allergen Reactions     Bupropion Hives     PN: LW Reaction: HIVES     Penicillins Nausea and Vomiting     PN: LW Reaction: Vomiting       No past medical history on file.    amLODIPine (NORVASC) 10 MG tablet, Take 10 mg by mouth daily  atenolol (TENORMIN) 50 MG tablet, Take 50 mg by mouth daily.  citalopram (CELEXA) 40 MG tablet, Take 40 mg by mouth daily.  losartan (COZAAR) 50 MG tablet, Take 50 mg by mouth daily  QUEtiapine (SEROQUEL) 25 MG tablet, TAKE 1 TABLET BY MOUTH EVERYDAY AT BEDTIME  HYDROcodone-acetaminophen 5-325 MG per tablet, 1-2 po every six hours as needed pain (Patient not taking: Reported on 12/20/2019)  predniSONE (DELTASONE) 20 MG tablet, Take 3 tablets by mouth daily for 5 days.  tadalafil (CIALIS) 20 MG tablet, Take 1 tablet by mouth daily as needed.  tamsulosin (FLOMAX) 0.4 MG capsule, TAKE 1 CAPSULE BY MOUTH EVERY DAY  triamcinolone (KENALOG) 0.5 % cream, Apply sparingly to affected area three times daily x 10-14 days.  Avoid use for longer than 2 weeks consecutively. (Patient not taking: Reported on 12/20/2019)  triamterene-hydrochlorothiazide (MAXZIDE-25) 37.5-25 MG per tablet, Take 1 tablet by mouth daily.  zolpidem (AMBIEN CR) 12.5 MG CR tablet, Take  by mouth nightly as needed.    No current facility-administered medications on file prior to visit.       Social History     Tobacco Use     Smoking status: Current Every Day Smoker     Packs/day: 0.50     Types:  Cigarettes     Smokeless tobacco: Never Used   Substance Use Topics     Alcohol use: Yes     Comment: daily       ROS:  CONSTITUTIONAL: Negative for fatigue or fever.  EYES: Negative for eye problems.  ENT: As above.  RESP: As above.  CV: Negative for chest pains.  GI: Negative for vomiting.  MUSCULOSKELETAL:  Negative for significant muscle or joint pains.  NEUROLOGIC: Negative for headaches.  SKIN: Negative for rash.  PSYCH: Normal mentation for age.    OBJECTIVE:  BP (!) 151/93 (BP Location: Left arm, Patient Position: Chair, Cuff Size: Adult Regular)   Pulse 100   Temp 98.3  F (36.8  C) (Oral)   Wt 118.4 kg (261 lb)   SpO2 95%   BMI 39.11 kg/m    GENERAL APPEARANCE: Healthy, alert and no distress.  EYES:Conjunctiva/sclera clear.  EARS: No cerumen.   Ear canals w/o erythema.  Right TM is clear.  Left TM is bright red.      NOSE/MOUTH: Nasal turbinates are red and inflamed   SINUSES: Moderate left maxillary sinus tenderness.  THROAT: No erythema w/o tonsillar enlargement . No exudates.  NECK: Supple, nontender, no lymphadenopathy.  RESP: Lungs clear to auscultation - no rales, rhonchi or wheezes  CV: Regular rate and rhythm, normal S1 S2, no murmur noted.  NEURO: Awake, alert    SKIN: No rashes        ASSESSMENT:     ICD-10-CM    1. Acute suppurative otitis media of left ear without spontaneous rupture of tympanic membrane, recurrence not specified H66.002 cefuroxime (CEFTIN) 500 MG tablet   2. Acute sinusitis with symptoms > 10 days J01.90 cefuroxime (CEFTIN) 500 MG tablet   3. Prostate cancer (H) C61              PLAN: Years ago had hives from penicillin.  Denies any lip, face, throat swelling or difficulty breathing at the time.  He refuses azithromycin saying it is never worked for him.  He has both sinusitis and otitis media.  Will prescribe Ceftin.  Told him there is a small chance of cross-reactivity given his penicillin allergy.  I feel benefits outweigh the risks.  Recheck 10 days , sooner as  needed  I have discussed clinical findings with patient.  Side effects of medications discussed.  Symptomatic care is discussed.  I have discussed the possibility of  worsening symptoms and to RTC or ER if they occur.  All questions are answered and patient is in agreement with plan.   Patient care instructions are given to at the end of visit.   Lots of rest and fluids.    Eleonora Wu PA-C

## 2019-12-26 RX ORDER — ATORVASTATIN CALCIUM 80 MG/1
TABLET, FILM COATED ORAL
Qty: 30 TABLET | Refills: 2 | Status: SHIPPED | OUTPATIENT
Start: 2019-12-26 | End: 2020-05-27

## 2020-01-01 ENCOUNTER — EXTERNAL RECORD (OUTPATIENT)
Dept: HEALTH INFORMATION MANAGEMENT | Facility: OTHER | Age: 60
End: 2020-01-01

## 2020-02-10 ENCOUNTER — TELEPHONE (OUTPATIENT)
Dept: SCHEDULING | Age: 60
End: 2020-02-10

## 2020-02-10 RX ORDER — AMLODIPINE BESYLATE 10 MG/1
TABLET ORAL
Qty: 30 TABLET | Refills: 8 | Status: SHIPPED | OUTPATIENT
Start: 2020-02-10 | End: 2021-01-11 | Stop reason: SDUPTHER

## 2020-02-10 RX ORDER — CITALOPRAM 40 MG/1
40 TABLET ORAL DAILY
Qty: 30 TABLET | Refills: 6 | Status: CANCELLED | OUTPATIENT
Start: 2020-02-10

## 2020-02-12 DIAGNOSIS — I10 ESSENTIAL HYPERTENSION: ICD-10-CM

## 2020-02-13 RX ORDER — LOSARTAN POTASSIUM 50 MG/1
75 TABLET ORAL DAILY
Qty: 90 TABLET | Refills: 0 | Status: SHIPPED | OUTPATIENT
Start: 2020-02-13 | End: 2020-05-29

## 2020-02-13 RX ORDER — CITALOPRAM 40 MG/1
40 TABLET ORAL DAILY
Qty: 90 TABLET | Refills: 0 | Status: SHIPPED | OUTPATIENT
Start: 2020-02-13 | End: 2020-05-11

## 2020-05-11 RX ORDER — CITALOPRAM 40 MG/1
TABLET ORAL
Qty: 90 TABLET | Refills: 2 | Status: SHIPPED | OUTPATIENT
Start: 2020-05-11 | End: 2021-01-11 | Stop reason: DRUGHIGH

## 2020-05-27 DIAGNOSIS — I10 ESSENTIAL HYPERTENSION: Primary | ICD-10-CM

## 2020-05-27 RX ORDER — ATORVASTATIN CALCIUM 80 MG/1
TABLET, FILM COATED ORAL
Qty: 30 TABLET | Refills: 0 | Status: SHIPPED | OUTPATIENT
Start: 2020-05-27 | End: 2020-07-02

## 2020-05-28 RX ORDER — TRIAMTERENE AND HYDROCHLOROTHIAZIDE 37.5; 25 MG/1; MG/1
CAPSULE ORAL
Qty: 30 CAPSULE | Refills: 8 | OUTPATIENT
Start: 2020-05-28

## 2020-05-28 RX ORDER — ATENOLOL 100 MG/1
TABLET ORAL
Qty: 30 TABLET | Refills: 2 | Status: SHIPPED | OUTPATIENT
Start: 2020-05-28 | End: 2020-11-25 | Stop reason: SDUPTHER

## 2020-05-28 RX ORDER — TAMSULOSIN HYDROCHLORIDE 0.4 MG/1
CAPSULE ORAL
Qty: 30 CAPSULE | Refills: 10 | OUTPATIENT
Start: 2020-05-28

## 2020-05-29 DIAGNOSIS — I10 ESSENTIAL HYPERTENSION: ICD-10-CM

## 2020-05-29 RX ORDER — LOSARTAN POTASSIUM 50 MG/1
TABLET ORAL
Qty: 90 TABLET | Refills: 0 | Status: SHIPPED | OUTPATIENT
Start: 2020-05-29 | End: 2020-10-12

## 2020-06-23 DIAGNOSIS — R22.42 MASS OF LEFT FOOT: ICD-10-CM

## 2020-06-24 RX ORDER — CLOTRIMAZOLE AND BETAMETHASONE DIPROPIONATE 10; .64 MG/G; MG/G
CREAM TOPICAL
Qty: 30 G | Refills: 0 | Status: SHIPPED | OUTPATIENT
Start: 2020-06-24 | End: 2021-12-02 | Stop reason: ALTCHOICE

## 2020-07-02 RX ORDER — ATORVASTATIN CALCIUM 80 MG/1
TABLET, FILM COATED ORAL
Qty: 30 TABLET | Refills: 0 | Status: SHIPPED | OUTPATIENT
Start: 2020-07-02 | End: 2020-07-29

## 2020-07-29 DIAGNOSIS — I10 ESSENTIAL HYPERTENSION: Primary | ICD-10-CM

## 2020-07-29 RX ORDER — ATORVASTATIN CALCIUM 80 MG/1
TABLET, FILM COATED ORAL
Qty: 30 TABLET | Refills: 3 | Status: SHIPPED | OUTPATIENT
Start: 2020-07-29 | End: 2021-01-11 | Stop reason: SDUPTHER

## 2020-10-10 DIAGNOSIS — I10 ESSENTIAL HYPERTENSION: ICD-10-CM

## 2020-10-12 RX ORDER — LOSARTAN POTASSIUM 50 MG/1
TABLET ORAL
Qty: 45 TABLET | Refills: 1 | Status: SHIPPED | OUTPATIENT
Start: 2020-10-12 | End: 2020-12-17

## 2020-11-01 DIAGNOSIS — B35.1 TOENAIL FUNGUS: ICD-10-CM

## 2020-11-01 RX ORDER — TAMSULOSIN HYDROCHLORIDE 0.4 MG/1
0.4 CAPSULE ORAL DAILY
Qty: 30 CAPSULE | Refills: 6 | Status: SHIPPED | OUTPATIENT
Start: 2020-11-01 | End: 2021-01-11 | Stop reason: ALTCHOICE

## 2020-11-04 ENCOUNTER — TELEPHONE (OUTPATIENT)
Dept: INTERNAL MEDICINE | Age: 60
End: 2020-11-04

## 2020-11-12 ENCOUNTER — TELEPHONE (OUTPATIENT)
Dept: SCHEDULING | Age: 60
End: 2020-11-12

## 2020-11-24 DIAGNOSIS — I10 ESSENTIAL HYPERTENSION: ICD-10-CM

## 2020-11-25 RX ORDER — ATENOLOL 100 MG/1
TABLET ORAL
Qty: 30 TABLET | Refills: 0 | OUTPATIENT
Start: 2020-11-25

## 2020-11-25 RX ORDER — ATENOLOL 100 MG/1
100 TABLET ORAL DAILY
Qty: 30 TABLET | Refills: 0 | Status: SHIPPED | OUTPATIENT
Start: 2020-11-25 | End: 2021-01-04

## 2020-12-16 DIAGNOSIS — I10 ESSENTIAL HYPERTENSION: ICD-10-CM

## 2020-12-17 RX ORDER — LOSARTAN POTASSIUM 50 MG/1
TABLET ORAL
Qty: 45 TABLET | Refills: 0 | Status: SHIPPED | OUTPATIENT
Start: 2020-12-17 | End: 2021-01-11 | Stop reason: SDUPTHER

## 2020-12-21 RX ORDER — CITALOPRAM 40 MG/1
TABLET ORAL
Qty: 30 TABLET | Refills: 17 | OUTPATIENT
Start: 2020-12-21

## 2020-12-28 ENCOUNTER — OFFICE VISIT (OUTPATIENT)
Dept: URGENT CARE | Facility: URGENT CARE | Age: 60
End: 2020-12-28
Payer: COMMERCIAL

## 2020-12-28 VITALS
BODY MASS INDEX: 39.56 KG/M2 | DIASTOLIC BLOOD PRESSURE: 100 MMHG | WEIGHT: 264 LBS | SYSTOLIC BLOOD PRESSURE: 172 MMHG | OXYGEN SATURATION: 99 % | TEMPERATURE: 97.6 F | HEART RATE: 74 BPM

## 2020-12-28 DIAGNOSIS — Z20.828 EXPOSURE TO SARS-ASSOCIATED CORONAVIRUS: Primary | ICD-10-CM

## 2020-12-28 DIAGNOSIS — J01.90 ACUTE NON-RECURRENT SINUSITIS, UNSPECIFIED LOCATION: ICD-10-CM

## 2020-12-28 DIAGNOSIS — I10 HYPERTENSION, UNSPECIFIED TYPE: ICD-10-CM

## 2020-12-28 PROCEDURE — U0003 INFECTIOUS AGENT DETECTION BY NUCLEIC ACID (DNA OR RNA); SEVERE ACUTE RESPIRATORY SYNDROME CORONAVIRUS 2 (SARS-COV-2) (CORONAVIRUS DISEASE [COVID-19]), AMPLIFIED PROBE TECHNIQUE, MAKING USE OF HIGH THROUGHPUT TECHNOLOGIES AS DESCRIBED BY CMS-2020-01-R: HCPCS | Performed by: PHYSICIAN ASSISTANT

## 2020-12-28 PROCEDURE — 99214 OFFICE O/P EST MOD 30 MIN: CPT | Performed by: PHYSICIAN ASSISTANT

## 2020-12-28 RX ORDER — DOXYCYCLINE HYCLATE 100 MG
100 TABLET ORAL 2 TIMES DAILY
Qty: 20 TABLET | Refills: 0 | Status: SHIPPED | OUTPATIENT
Start: 2020-12-28 | End: 2021-01-07

## 2020-12-28 RX ORDER — BENZONATATE 200 MG/1
200 CAPSULE ORAL 3 TIMES DAILY PRN
Qty: 30 CAPSULE | Refills: 0 | Status: SHIPPED | OUTPATIENT
Start: 2020-12-28

## 2020-12-28 RX ORDER — ATORVASTATIN CALCIUM 80 MG/1
TABLET, FILM COATED ORAL
COMMUNITY
Start: 2019-09-11

## 2020-12-28 RX ORDER — AMLODIPINE BESYLATE 10 MG/1
10 TABLET ORAL DAILY
Qty: 45 TABLET | Refills: 0 | Status: SHIPPED | OUTPATIENT
Start: 2020-12-28

## 2020-12-28 ASSESSMENT — ENCOUNTER SYMPTOMS
APPETITE CHANGE: 0
DIAPHORESIS: 1
FATIGUE: 0
CHILLS: 1
HEADACHES: 1
SORE THROAT: 0
MYALGIAS: 0
CARDIOVASCULAR NEGATIVE: 1
RHINORRHEA: 1
SINUS PAIN: 1
GASTROINTESTINAL NEGATIVE: 1
COUGH: 1
FEVER: 0
SINUS PRESSURE: 1

## 2020-12-28 ASSESSMENT — PAIN SCALES - GENERAL: PAINLEVEL: NO PAIN (0)

## 2020-12-28 NOTE — PATIENT INSTRUCTIONS
"  Patient Education   After Your COVID-19 (Coronavirus) Test  You have been tested for COVID-19 (coronavirus).   If you'll have surgery in the next few days, we'll let you know ahead of time if you have the virus. Please call your surgeon's office with any questions.  For all other patients: Results are usually available in [employer] within 2 to 3 days.   If you do not have a [employer] account, you'll get a letter in the mail in about 7 to 10 days.   [employer] is often the fastest way to get test results. Please sign up if you do not already have a [employer] account. See the handout Getting COVID-19 Test Results in [employer] for help.  What if my test result is positive?  If your test is positive and you have not viewed your result in [employer], you'll get a phone call with your result. (A positive test means that you have the virus.)     Follow the tips under \"How do I self-isolate?\" below for 10 days (20 days if you have a weak immune system).    You don't need to be retested for COVID-19 before going back to school or work. As long as you're fever-free and feeling better, you can go back to school, work and other activities after waiting the 10 or 20 days.  What if I have questions after I get my results?  If you have questions about your results, please visit our testing website at [email or web address].   After 7 to 10 days, if you have not gotten your results:     Call [phone] ([phone]) and ask to speak with our COVID-19 results team.    If you're being treated at an infusion center: Call your infusion center directly.  What are the symptoms of COVID-19?  Cough, fever and trouble breathing are the most common signs of COVID-19.  Other symptoms can include new headaches, new muscle or body aches, new and unexplained fatigue (feeling very tired), chills, sore throat, congestion (stuffy or runny nose), diarrhea (loose poop), loss of taste or smell, belly pain, and nausea or vomiting " "(feeling sick to your stomach or throwing up).  You may already have symptoms of COVID-19, or they may show up later.  What should I do if I have symptoms?  If you're having surgery: Call your surgeon's office.  For all other patients: Stay home and away from others (self-isolate) until ...    You've had no fever--and no medicine that reduces fever--for 1 full day (24 hours), AND    Other symptoms have gotten better. For example, your cough or breathing has improved, AND    At least 10 days have passed since your symptoms first started.  How do I self-isolate?    Stay in your own room, even for meals. Use your own bathroom if you can.    Stay away from others in your home. No hugging, kissing or shaking hands. No visitors.    Don't go to work, school or anywhere else.    Clean \"high touch\" surfaces often (doorknobs, counters, handles). Use household cleaning spray or wipes. You'll find a full list of  on the EPA website: www.epa.gov/pesticide-registration/list-n-disinfectants-use-against-sars-cov-2.    Cover your mouth and nose with a mask or other face covering to avoid spreading germs.    Wash your hands and face often. Use soap and water.    Caregivers in these groups are at risk for severe illness due to COVID-19:  ? People 65 years and older  ? People who live in a nursing home or long-term care facility  ? People with chronic disease (lung, heart, cancer, diabetes, kidney, liver, immunologic)  ? People who have a weakened immune system, including those who:    Are in cancer treatment    Take medicine that weakens the immune system, such as corticosteroids    Had a bone marrow or organ transplant    Have an immune deficiency    Have poorly controlled HIV or AIDS    Are obese (body mass index of 40 or higher)    Smoke regularly    Caregivers should wear gloves while washing dishes, handling laundry and cleaning bedrooms and bathrooms.    Use caution when washing and drying laundry: Don't shake dirty " laundry and use the warmest water setting that you can.    For more tips on managing your health at home, go to www.cdc.gov/coronavirus/2019-ncov/downloads/10Things.pdf.  How can I take care of myself at home?  1. Get lots of rest. Drink extra fluids (unless a doctor has told you not to).  2. Take Tylenol (acetaminophen) for fever or pain. If you have liver or kidney problems, ask your family doctor if it's OK to take Tylenol.   Adults can take either:  ? 650 mg (two 325 mg pills) every 4 to 6 hours, or   ? 1,000 mg (two 500 mg pills) every 8 hours as needed.  ? Note: Don't take more than 3,000 mg in one day. Acetaminophen is found in many medicines (both prescribed and over-the-counter medicines). Read all labels to be sure you don't take too much.   For children, check the Tylenol bottle for the right dose. The dose is based on the child's age or weight.  3. If you have other health problems (like cancer, heart failure, an organ transplant or severe kidney disease): Call your specialty clinic if you don't feel better in the next 2 days.  4. Know when to call 911. Emergency warning signs include:  ? Trouble breathing or shortness of breath  ? Chest pain or pressure that doesn't go away  ? Feeling confused like you haven't felt before, or not being able to wake up  ? Bluish-colored lips or face  5. If your doctor prescribed a blood thinner medicine: Follow their instructions.  Where can I get more information?    [facility] - About COVID-19:   [email or web address]    CDC - If You're Sick: cdc.gov/coronavirus/2019-ncov/about/steps-when-sick.html    CDC - Ending Home Isolation: www.cdc.gov/coronavirus/2019-ncov/hcp/disposition-in-home-patients.html    CDC - Caring for Someone: www.cdc.gov/coronavirus/2019-ncov/if-you-are-sick/care-for-someone.html    [facility] - Interim Guidance for Hospital Discharge to Home: www.health.[facility].[place]/diseases/coronavirus/hcp/hospdischarge.pdf    [facility]  clinical trials (COVID-19 research studies): clinicalaffairs.[facility]-clinical-trials    Below are the COVID-19 hotlines at the [place] Department of [facility] ([facility]). Interpreters are available.  ? For health questions: Call 192-577-7971 or 1-289.475.4812 (7 a.m. to 7 p.m.)  ? For questions about schools and childcare: Call 318-989-9309 or 1-103.638.8270 (7 a.m. to 7 p.m.)    For informational purposes only. Not to replace the advice of your health care provider. Clinically reviewed by Infection Prevention and the [facility] COVID-19 Clinical Team. Copyright   2020 [employer]. All rights reserved. [employer] [id number] - Rev 11/11/20.       Patient Education     Sinusitis (Antibiotic Treatment)    The sinuses are air-filled spaces within the bones of the face. They connect to the inside of the nose. Sinusitis is an inflammation of the tissue that lines the sinuses. Sinusitis can occur during a cold. It can also happen due to allergies to pollens and other particles in the air. Sinusitis can cause symptoms of sinus congestion and a feeling of fullness. A sinus infection causes fever, headache, and facial pain. There is often green or yellow fluid draining from the nose or into the back of the throat (post-nasal drip). You have been given antibiotics to treat this condition.   Home care    Take the full course of antibiotics as instructed. Don't stop taking them, even when you feel better.    Drink plenty of water, hot tea, and other liquids as directed by the healthcare provider. This may help thin nasal mucus. It also may help your sinuses drain fluids.    Heat may help soothe painful areas of your face. Use a towel soaked in hot water. Or,  the shower and direct the warm spray onto your face. Using a vaporizer along with a menthol rub at night may also help soothe symptoms.     An expectorant with guaifenesin may help thin nasal mucus and help your sinuses drain fluids. Talk with your  provider or pharmacists before taking an over-the-counter (OTC) medicine if you have any questions about it or its side effects..    You can use an OTC decongestant, unless a similar medicine was prescribed to you. Nasal sprays work the fastest. Use one that contains phenylephrine or oxymetazoline. First blow your nose gently. Then use the spray. Don't use these medicines more often than directed on the label. If you do, your symptoms may get worse. You may also take pills that contain pseudoephedrine. Don t use products that combine multiple medicines. This is because side effects may be increased. Read labels. You can also ask the pharmacist for help. (People with high blood pressure should not use decongestants. They can raise blood pressure.) Talk with your provider or pharmacist if you have any questions about the medicine..    OTC antihistamines may help if allergies contributed to your sinusitis. Talk with your provider or pharmacist if you have any questions about the medicine..    Don't use nasal rinses or irrigation during an acute sinus infection, unless your healthcare provider tells you to. Rinsing may spread the infection to other areas in your sinuses.    Use acetaminophen or ibuprofen to control pain, unless another pain medicine was prescribed to you. If you have chronic liver or kidney disease or ever had a stomach ulcer, talk with your healthcare provider before using these medicines. Never give aspirin to anyone under age 18 who is ill with a fever. It may cause severe liver damage.    Don't smoke. This can make symptoms worse.    Follow-up care  Follow up with your healthcare provider, or as advised.   When to seek medical advice  Call your healthcare provider if any of these occur:     Facial pain or headache that gets worse    Stiff neck    Unusual drowsiness or confusion    Swelling of your forehead or eyelids    Symptoms don't go away in 10 days    Vision problems, such as blurred or double  vision    Fever of 100.4 F (38 C) or higher, or as directed by your healthcare provider  Call 911  Call 911 if any of these occur:     Seizure    Trouble breathing    Feeling dizzy or faint    Fingernails, skin or lips look blue, purple , or gray  Prevention  Here are steps you can take to help prevent an infection:     Keep good hand washing habits.    Don t have close contact with people who have sore throats, colds, or other upper respiratory infections.    Don t smoke, and stay away from secondhand smoke.    Stay up to date with of your vaccines.  [employer] last reviewed this educational content on 12/1/2019 2000-2020 [employer]. [address] [place] [address], [place] [address]. All rights reserved. This information is not intended as a substitute for professional medical care. Always follow your healthcare professional's instructions.

## 2020-12-28 NOTE — PROGRESS NOTES
SUBJECTIVE:   Malvin Torres is a 60 year old male presenting with a chief complaint of   Chief Complaint   Patient presents with     URI     sx for the last 4 weeks and getting worse-clear runny nose, cough, and sneezing     Rash     since Christmas-on arms and thighs       He is an established patient of [facility].  Patient presents with rhinorrhea (clear), face pressure, body aches, rash itchy (since 12/25).  covid tested before thanksgiving - negative.  Bilateral ear pain and itching. Symptoms x 4 weeks.  Symptoms the same.    Patient states he did not take amlodipine and PCP will not refill due to no appointment until February.      Treatment:  Nyquil, coricidan, waltec, claritin, topical cortaid    Still smoking, last smoke this morning.    PMHx:  Prostate ca, HTN, depression,      Review of Systems   Constitutional: Positive for chills and diaphoresis. Negative for appetite change, fatigue and fever.   HENT: Positive for congestion, rhinorrhea, sinus pressure and sinus pain. Negative for ear pain and sore throat.    Respiratory: Positive for cough.    Cardiovascular: Negative.    Gastrointestinal: Negative.    Musculoskeletal: Negative for myalgias.   Neurological: Positive for headaches.   All other systems reviewed and are negative.      No past medical history on file.  History reviewed. No pertinent family history.  Current Outpatient Medications   Medication Sig Dispense Refill     amLODIPine (NORVASC) 10 MG tablet Take 1 tablet (10 mg) by mouth daily 45 tablet 0     amLODIPine (NORVASC) 10 MG tablet Take 10 mg by mouth daily       atenolol (TENORMIN) 50 MG tablet Take 50 mg by mouth daily.       atorvastatin (LIPITOR) 80 MG tablet TAKE 1 TABLET BY MOUTH EVERYDAY AT BEDTIME       benzonatate (TESSALON) 200 MG capsule Take 1 capsule (200 mg) by mouth 3 times daily as needed for cough 30 capsule 0     citalopram (CELEXA) 40 MG tablet Take 40 mg by mouth daily.       doxycycline hyclate (VIBRA-TABS) 100 MG  tablet Take 1 tablet (100 mg) by mouth 2 times daily for 10 days 20 tablet 0     losartan (COZAAR) 50 MG tablet Take 50 mg by mouth daily       tamsulosin (FLOMAX) 0.4 MG capsule TAKE 1 CAPSULE BY MOUTH EVERY DAY       HYDROcodone-acetaminophen 5-325 MG per tablet 1-2 po every six hours as needed pain (Patient not taking: Reported on 12/20/2019) 25 tablet 0     predniSONE (DELTASONE) 20 MG tablet Take 3 tablets by mouth daily for 5 days. 15 tablet 0     QUEtiapine (SEROQUEL) 25 MG tablet TAKE 1 TABLET BY MOUTH EVERYDAY AT BEDTIME       tadalafil (CIALIS) 20 MG tablet Take 1 tablet by mouth daily as needed.       triamcinolone (KENALOG) 0.5 % cream Apply sparingly to affected area three times daily x 10-14 days.  Avoid use for longer than 2 weeks consecutively. (Patient not taking: Reported on 12/20/2019) 30 g 0     triamterene-hydrochlorothiazide (MAXZIDE-25) 37.5-25 MG per tablet Take 1 tablet by mouth daily.       zolpidem (AMBIEN CR) 12.5 MG CR tablet Take  by mouth nightly as needed.       Social History     Tobacco Use     Smoking status: Current Every Day Smoker     Packs/day: 0.50     Types: Cigarettes     Smokeless tobacco: Never Used   Substance Use Topics     Alcohol use: Yes     Comment: daily       OBJECTIVE  BP (!) 172/100 (BP Location: Right arm, Patient Position: Sitting, Cuff Size: Adult Large)   Pulse 74   Temp 97.6  F (36.4  C) (Oral)   Wt 119.7 kg (264 lb)   SpO2 99%   BMI 39.56 kg/m      Physical Exam  Vitals signs and nursing note reviewed.   Constitutional:       General: He is not in acute distress.     Appearance: Normal appearance. He is obese. He is not ill-appearing, toxic-appearing or diaphoretic.   HENT:      Head: Normocephalic and atraumatic.      Right Ear: Tympanic membrane, ear canal and external ear normal.      Left Ear: Tympanic membrane, ear canal and external ear normal.      Nose: Nose normal.      Mouth/Throat:      Mouth: Mucous membranes are moist.      Pharynx:  Oropharynx is clear.      Comments: pnd    Eyes:      Extraocular Movements: Extraocular movements intact.      Conjunctiva/sclera: Conjunctivae normal.   Neck:      Musculoskeletal: Normal range of motion and neck supple.   Cardiovascular:      Rate and Rhythm: Normal rate and regular rhythm.      Pulses: Normal pulses.      Heart sounds: Normal heart sounds.   Pulmonary:      Effort: Pulmonary effort is normal.      Breath sounds: Normal breath sounds.   Skin:     General: Skin is warm and dry.      Capillary Refill: Capillary refill takes less than 2 seconds.   Neurological:      General: No focal deficit present.      Mental Status: He is alert.   Psychiatric:         Mood and Affect: Mood normal.         Behavior: Behavior normal.         Labs:  No results found for this or any previous visit (from the past 24 hour(s)).    X-Ray was not done.    ASSESSMENT:      ICD-10-CM    1. Exposure to SARS-associated coronavirus  Z20.828 Symptomatic COVID-19 Virus (Coronavirus) by PCR   2. Hypertension, unspecified type  I10 amLODIPine (NORVASC) 10 MG tablet   3. Acute non-recurrent sinusitis, unspecified location  J01.90 doxycycline hyclate (VIBRA-TABS) 100 MG tablet     benzonatate (TESSALON) 200 MG capsule        Medical Decision Making:    Differential Diagnosis:  Sinusitis, covid, uri, bronchitis    Serious Comorbid Conditions:  Adult:  as above    PLAN:    URI Adult:  Discussed and recommended CDC guidelines for self isolation.  COVID test pending.    .discussion  rx for doxycycline, tessalon perles.  RF on amlodipine    Followup:    If not improving or if condition worsens, follow up with your Primary Care Provider, If not improving or if conditions worsens over the next 12-24 hours, go to the Emergency Department    Patient Instructions     Patient Education   After Your COVID-19 (Coronavirus) Test  You have been tested for COVID-19 (coronavirus).   If you'll have surgery in the next few days, we'll let you know  "ahead of time if you have the virus. Please call your surgeon's office with any questions.  For all other patients: Results are usually available in [facility] Minutes within 2 to 3 days.   If you do not have a 5 Minutes account, you'll get a letter in the mail in about 7 to 10 days.   MyChart is often the fastest way to get test results. Please sign up if you do not already have a [employer] account. See the handout Getting COVID-19 Test Results in [employer] for help.  What if my test result is positive?  If your test is positive and you have not viewed your result in [facility] [employer], you'll get a phone call with your result. (A positive test means that you have the virus.)     Follow the tips under \"How do I self-isolate?\" below for 10 days (20 days if you have a weak immune system).    You don't need to be retested for COVID-19 before going back to school or work. As long as you're fever-free and feeling better, you can go back to school, work and other activities after waiting the 10 or 20 days.  What if I have questions after I get my results?  If you have questions about your results, please visit our testing website at [email or web address].   After 7 to 10 days, if you have not gotten your results:     Call [phone] ([phone]) and ask to speak with our COVID-19 results team.    If you're being treated at an infusion center: Call your infusion center directly.  What are the symptoms of COVID-19?  Cough, fever and trouble breathing are the most common signs of COVID-19.  Other symptoms can include new headaches, new muscle or body aches, new and unexplained fatigue (feeling very tired), chills, sore throat, congestion (stuffy or runny nose), diarrhea (loose poop), loss of taste or smell, belly pain, and nausea or vomiting (feeling sick to your stomach or throwing up).  You may already have symptoms of COVID-19, or they may show up later.  What should I do if I have symptoms?  If you're having " "surgery: Call your surgeon's office.  For all other patients: Stay home and away from others (self-isolate) until ...    You've had no fever--and no medicine that reduces fever--for 1 full day (24 hours), AND    Other symptoms have gotten better. For example, your cough or breathing has improved, AND    At least 10 days have passed since your symptoms first started.  How do I self-isolate?    Stay in your own room, even for meals. Use your own bathroom if you can.    Stay away from others in your home. No hugging, kissing or shaking hands. No visitors.    Don't go to work, school or anywhere else.    Clean \"high touch\" surfaces often (doorknobs, counters, handles). Use household cleaning spray or wipes. You'll find a full list of  on the EPA website: www.epa.gov/pesticide-registration/list-n-disinfectants-use-against-sars-cov-2.    Cover your mouth and nose with a mask or other face covering to avoid spreading germs.    Wash your hands and face often. Use soap and water.    Caregivers in these groups are at risk for severe illness due to COVID-19:  ? People 65 years and older  ? People who live in a nursing home or long-term care facility  ? People with chronic disease (lung, heart, cancer, diabetes, kidney, liver, immunologic)  ? People who have a weakened immune system, including those who:    Are in cancer treatment    Take medicine that weakens the immune system, such as corticosteroids    Had a bone marrow or organ transplant    Have an immune deficiency    Have poorly controlled HIV or AIDS    Are obese (body mass index of 40 or higher)    Smoke regularly    Caregivers should wear gloves while washing dishes, handling laundry and cleaning bedrooms and bathrooms.    Use caution when washing and drying laundry: Don't shake dirty laundry and use the warmest water setting that you can.    For more tips on managing your health at home, go to www.cdc.gov/coronavirus/2019-ncov/downloads/10Things.pdf.  How can " I take care of myself at home?  1. Get lots of rest. Drink extra fluids (unless a doctor has told you not to).  2. Take Tylenol (acetaminophen) for fever or pain. If you have liver or kidney problems, ask your family doctor if it's OK to take Tylenol.   Adults can take either:  ? 650 mg (two 325 mg pills) every 4 to 6 hours, or   ? 1,000 mg (two 500 mg pills) every 8 hours as needed.  ? Note: Don't take more than 3,000 mg in one day. Acetaminophen is found in many medicines (both prescribed and over-the-counter medicines). Read all labels to be sure you don't take too much.   For children, check the Tylenol bottle for the right dose. The dose is based on the child's age or weight.  3. If you have other health problems (like cancer, heart failure, an organ transplant or severe kidney disease): Call your specialty clinic if you don't feel better in the next 2 days.  4. Know when to call 911. Emergency warning signs include:  ? Trouble breathing or shortness of breath  ? Chest pain or pressure that doesn't go away  ? Feeling confused like you haven't felt before, or not being able to wake up  ? Bluish-colored lips or face  5. If your doctor prescribed a blood thinner medicine: Follow their instructions.  Where can I get more information?    [facility] - About COVID-19:   [email or web address]    CDC - If You're Sick: cdc.gov/coronavirus/2019-ncov/about/steps-when-sick.html    CDC - Ending Home Isolation: www.cdc.gov/coronavirus/2019-ncov/hcp/disposition-in-home-patients.html    CDC - Caring for Someone: www.cdc.gov/coronavirus/2019-ncov/if-you-are-sick/care-for-someone.html    [facility] - Interim Guidance for Hospital Discharge to Home: www.health.[facility]/diseases/coronavirus/hcp/hospdischarge.pdf    [facility] clinical trials (COVID-19 research studies): [email or web address]-trials    Below are the COVID-19 hotlines at the [place] Department of Health ([facility]). Interpreters  are available.  ? For health questions: Call 542-288-2463 or 1-940.303.8910 (7 a.m. to 7 p.m.)  ? For questions about schools and childcare: Call 185-487-9868 or [phone] (7 a.m. to 7 p.m.)    For informational purposes only. Not to replace the advice of your health care provider. Clinically reviewed by Infection Prevention and [facility] COVID-19 Clinical Team. Copyright   2020 [employer]. All rights reserved. [employer] 218518 - Rev 11/11/20.       Patient Education     Sinusitis (Antibiotic Treatment)    The sinuses are air-filled spaces within the bones of the face. They connect to the inside of the nose. Sinusitis is an inflammation of the tissue that lines the sinuses. Sinusitis can occur during a cold. It can also happen due to allergies to pollens and other particles in the air. Sinusitis can cause symptoms of sinus congestion and a feeling of fullness. A sinus infection causes fever, headache, and facial pain. There is often green or yellow fluid draining from the nose or into the back of the throat (post-nasal drip). You have been given antibiotics to treat this condition.   Home care    Take the full course of antibiotics as instructed. Don't stop taking them, even when you feel better.    Drink plenty of water, hot tea, and other liquids as directed by the healthcare provider. This may help thin nasal mucus. It also may help your sinuses drain fluids.    Heat may help soothe painful areas of your face. Use a towel soaked in hot water. Or,  the shower and direct the warm spray onto your face. Using a vaporizer along with a menthol rub at night may also help soothe symptoms.     An expectorant with guaifenesin may help thin nasal mucus and help your sinuses drain fluids. Talk with your provider or pharmacists before taking an over-the-counter (OTC) medicine if you have any questions about it or its side effects..    You can use an OTC decongestant, unless a similar  medicine was prescribed to you. Nasal sprays work the fastest. Use one that contains phenylephrine or oxymetazoline. First blow your nose gently. Then use the spray. Don't use these medicines more often than directed on the label. If you do, your symptoms may get worse. You may also take pills that contain pseudoephedrine. Don t use products that combine multiple medicines. This is because side effects may be increased. Read labels. You can also ask the pharmacist for help. (People with high blood pressure should not use decongestants. They can raise blood pressure.) Talk with your provider or pharmacist if you have any questions about the medicine..    OTC antihistamines may help if allergies contributed to your sinusitis. Talk with your provider or pharmacist if you have any questions about the medicine..    Don't use nasal rinses or irrigation during an acute sinus infection, unless your healthcare provider tells you to. Rinsing may spread the infection to other areas in your sinuses.    Use acetaminophen or ibuprofen to control pain, unless another pain medicine was prescribed to you. If you have chronic liver or kidney disease or ever had a stomach ulcer, talk with your healthcare provider before using these medicines. Never give aspirin to anyone under age 18 who is ill with a fever. It may cause severe liver damage.    Don't smoke. This can make symptoms worse.    Follow-up care  Follow up with your healthcare provider, or as advised.   When to seek medical advice  Call your healthcare provider if any of these occur:     Facial pain or headache that gets worse    Stiff neck    Unusual drowsiness or confusion    Swelling of your forehead or eyelids    Symptoms don't go away in 10 days    Vision problems, such as blurred or double vision    Fever of 100.4 F (38 C) or higher, or as directed by your healthcare provider  Call 911  Call 911 if any of these occur:     Seizure    Trouble breathing    Feeling dizzy or  faint    Fingernails, skin or lips look blue, purple , or gray  Prevention  Here are steps you can take to help prevent an infection:     Keep good hand washing habits.    Don t have close contact with people who have sore throats, colds, or other upper respiratory infections.    Don t smoke, and stay away from secondhand smoke.    Stay up to date with of your vaccines.  [employer] last reviewed this educational content on 12/1/2019 2000-2020 [employer]. [address] [place] [id number]. All rights reserved. This information is not intended as a substitute for professional medical care. Always follow your healthcare professional's instructions.

## 2020-12-29 LAB
SARS-COV-2 RNA SPEC QL NAA+PROBE: NOT DETECTED
SPECIMEN SOURCE: NORMAL

## 2021-01-03 DIAGNOSIS — I10 ESSENTIAL HYPERTENSION: ICD-10-CM

## 2021-01-04 RX ORDER — ATENOLOL 100 MG/1
TABLET ORAL
Qty: 30 TABLET | Refills: 0 | Status: SHIPPED | OUTPATIENT
Start: 2021-01-04 | End: 2021-01-11 | Stop reason: SDUPTHER

## 2021-01-08 ENCOUNTER — TELEPHONE (OUTPATIENT)
Dept: SCHEDULING | Age: 61
End: 2021-01-08

## 2021-01-09 DIAGNOSIS — I10 ESSENTIAL HYPERTENSION: ICD-10-CM

## 2021-01-11 ENCOUNTER — V-VISIT (OUTPATIENT)
Dept: FAMILY MEDICINE | Age: 61
End: 2021-01-11

## 2021-01-11 DIAGNOSIS — G89.29 CHRONIC BILATERAL LOW BACK PAIN WITHOUT SCIATICA: ICD-10-CM

## 2021-01-11 DIAGNOSIS — M25.562 CHRONIC PAIN OF LEFT KNEE: ICD-10-CM

## 2021-01-11 DIAGNOSIS — I10 ESSENTIAL HYPERTENSION: ICD-10-CM

## 2021-01-11 DIAGNOSIS — F10.20 UNCOMPLICATED ALCOHOL DEPENDENCE (CMD): ICD-10-CM

## 2021-01-11 DIAGNOSIS — E78.5 DYSLIPIDEMIA: ICD-10-CM

## 2021-01-11 DIAGNOSIS — B96.89 SINUSITIS, BACTERIAL: ICD-10-CM

## 2021-01-11 DIAGNOSIS — Z13.30 ENCOUNTER FOR BEHAVIORAL HEALTH SCREENING: ICD-10-CM

## 2021-01-11 DIAGNOSIS — G89.29 CHRONIC PAIN OF LEFT KNEE: ICD-10-CM

## 2021-01-11 DIAGNOSIS — J32.9 CHRONIC SINUSITIS, UNSPECIFIED LOCATION: ICD-10-CM

## 2021-01-11 DIAGNOSIS — R35.0 INCREASED URINARY FREQUENCY: ICD-10-CM

## 2021-01-11 DIAGNOSIS — F33.2 SEVERE EPISODE OF RECURRENT MAJOR DEPRESSIVE DISORDER, WITHOUT PSYCHOTIC FEATURES (CMD): Primary | ICD-10-CM

## 2021-01-11 DIAGNOSIS — J31.0 CHRONIC RHINITIS: ICD-10-CM

## 2021-01-11 DIAGNOSIS — M54.50 CHRONIC BILATERAL LOW BACK PAIN WITHOUT SCIATICA: ICD-10-CM

## 2021-01-11 DIAGNOSIS — G47.33 OSA (OBSTRUCTIVE SLEEP APNEA): ICD-10-CM

## 2021-01-11 DIAGNOSIS — Z13.31 SCREENING FOR DEPRESSION: ICD-10-CM

## 2021-01-11 DIAGNOSIS — C61 PROSTATE CANCER (CMD): ICD-10-CM

## 2021-01-11 DIAGNOSIS — J32.9 SINUSITIS, BACTERIAL: ICD-10-CM

## 2021-01-11 DIAGNOSIS — J30.2 SEASONAL ALLERGIC RHINITIS, UNSPECIFIED TRIGGER: ICD-10-CM

## 2021-01-11 PROCEDURE — 96127 BRIEF EMOTIONAL/BEHAV ASSMT: CPT | Performed by: FAMILY MEDICINE

## 2021-01-11 PROCEDURE — 99214 OFFICE O/P EST MOD 30 MIN: CPT | Performed by: FAMILY MEDICINE

## 2021-01-11 RX ORDER — QUETIAPINE FUMARATE 25 MG/1
25 TABLET, FILM COATED ORAL AT BEDTIME
Qty: 90 TABLET | Refills: 3 | Status: SHIPPED | OUTPATIENT
Start: 2021-01-11 | End: 2021-01-11 | Stop reason: SDUPTHER

## 2021-01-11 RX ORDER — LOSARTAN POTASSIUM 100 MG/1
100 TABLET ORAL DAILY
Qty: 90 TABLET | Refills: 3 | Status: SHIPPED | OUTPATIENT
Start: 2021-01-11 | End: 2021-01-13 | Stop reason: SDUPTHER

## 2021-01-11 RX ORDER — BUPROPION HYDROCHLORIDE 150 MG/1
150 TABLET ORAL DAILY
Qty: 90 TABLET | Refills: 3 | Status: SHIPPED | OUTPATIENT
Start: 2021-01-11 | End: 2021-01-13 | Stop reason: SDUPTHER

## 2021-01-11 RX ORDER — BUPROPION HYDROCHLORIDE 150 MG/1
150 TABLET ORAL DAILY
Qty: 90 TABLET | Refills: 3 | Status: SHIPPED | OUTPATIENT
Start: 2021-01-11 | End: 2021-01-11 | Stop reason: SDUPTHER

## 2021-01-11 RX ORDER — MONTELUKAST SODIUM 10 MG/1
10 TABLET ORAL
Qty: 90 TABLET | Refills: 3 | Status: SHIPPED | OUTPATIENT
Start: 2021-01-11 | End: 2021-01-13 | Stop reason: SDUPTHER

## 2021-01-11 RX ORDER — ATENOLOL 100 MG/1
100 TABLET ORAL DAILY
Qty: 90 TABLET | Refills: 3 | Status: SHIPPED | OUTPATIENT
Start: 2021-01-11 | End: 2021-01-11 | Stop reason: SDUPTHER

## 2021-01-11 RX ORDER — AMLODIPINE BESYLATE 10 MG/1
10 TABLET ORAL DAILY
Qty: 90 TABLET | Refills: 3 | Status: SHIPPED | OUTPATIENT
Start: 2021-01-11 | End: 2021-01-11 | Stop reason: SDUPTHER

## 2021-01-11 RX ORDER — QUETIAPINE FUMARATE 25 MG/1
25 TABLET, FILM COATED ORAL AT BEDTIME
Qty: 90 TABLET | Refills: 3 | Status: SHIPPED | OUTPATIENT
Start: 2021-01-11 | End: 2021-01-13 | Stop reason: SDUPTHER

## 2021-01-11 RX ORDER — MONTELUKAST SODIUM 10 MG/1
10 TABLET ORAL
Qty: 90 TABLET | Refills: 3 | Status: SHIPPED | OUTPATIENT
Start: 2021-01-11 | End: 2021-01-11 | Stop reason: SDUPTHER

## 2021-01-11 RX ORDER — ATORVASTATIN CALCIUM 80 MG/1
80 TABLET, FILM COATED ORAL DAILY
Qty: 90 TABLET | Refills: 3 | Status: SHIPPED | OUTPATIENT
Start: 2021-01-11 | End: 2021-01-11 | Stop reason: SDUPTHER

## 2021-01-11 RX ORDER — LOSARTAN POTASSIUM 50 MG/1
TABLET ORAL
Qty: 45 TABLET | Refills: 0 | OUTPATIENT
Start: 2021-01-11

## 2021-01-11 RX ORDER — AMOXICILLIN AND CLAVULANATE POTASSIUM 875; 125 MG/1; MG/1
1 TABLET, FILM COATED ORAL EVERY 12 HOURS
Qty: 20 TABLET | Refills: 0 | Status: SHIPPED | OUTPATIENT
Start: 2021-01-11 | End: 2021-01-13 | Stop reason: SDUPTHER

## 2021-01-11 RX ORDER — LOSARTAN POTASSIUM 100 MG/1
100 TABLET ORAL DAILY
Qty: 90 TABLET | Refills: 3 | Status: SHIPPED | OUTPATIENT
Start: 2021-01-11 | End: 2021-01-11 | Stop reason: SDUPTHER

## 2021-01-11 RX ORDER — CITALOPRAM 20 MG/1
20 TABLET ORAL DAILY
Qty: 7 TABLET | Refills: 0 | Status: SHIPPED | OUTPATIENT
Start: 2021-01-11 | End: 2021-01-11 | Stop reason: SDUPTHER

## 2021-01-11 RX ORDER — TAMSULOSIN HYDROCHLORIDE 0.4 MG/1
0.4 CAPSULE ORAL
Qty: 30 CAPSULE | Refills: 11 | Status: SHIPPED | OUTPATIENT
Start: 2021-01-11 | End: 2021-01-11 | Stop reason: SDUPTHER

## 2021-01-11 RX ORDER — ATORVASTATIN CALCIUM 80 MG/1
80 TABLET, FILM COATED ORAL DAILY
Qty: 90 TABLET | Refills: 3 | Status: SHIPPED | OUTPATIENT
Start: 2021-01-11 | End: 2021-01-13 | Stop reason: SDUPTHER

## 2021-01-11 RX ORDER — CITALOPRAM 20 MG/1
20 TABLET ORAL DAILY
Qty: 7 TABLET | Refills: 0 | Status: SHIPPED | OUTPATIENT
Start: 2021-01-11 | End: 2021-01-13 | Stop reason: SDUPTHER

## 2021-01-11 RX ORDER — TAMSULOSIN HYDROCHLORIDE 0.4 MG/1
0.4 CAPSULE ORAL
Qty: 30 CAPSULE | Refills: 11 | Status: SHIPPED | OUTPATIENT
Start: 2021-01-11 | End: 2021-01-13 | Stop reason: SDUPTHER

## 2021-01-11 RX ORDER — ATENOLOL 100 MG/1
100 TABLET ORAL DAILY
Qty: 90 TABLET | Refills: 3 | Status: SHIPPED | OUTPATIENT
Start: 2021-01-11 | End: 2021-01-13 | Stop reason: SDUPTHER

## 2021-01-11 RX ORDER — AMLODIPINE BESYLATE 10 MG/1
10 TABLET ORAL DAILY
Qty: 90 TABLET | Refills: 3 | Status: SHIPPED | OUTPATIENT
Start: 2021-01-11 | End: 2021-01-13 | Stop reason: SDUPTHER

## 2021-01-11 SDOH — HEALTH STABILITY: PHYSICAL HEALTH: ON AVERAGE, HOW MANY MINUTES DO YOU ENGAGE IN EXERCISE AT THIS LEVEL?: 90 MIN

## 2021-01-11 SDOH — HEALTH STABILITY: MENTAL HEALTH: HOW OFTEN DO YOU HAVE A DRINK CONTAINING ALCOHOL?: NEVER

## 2021-01-11 SDOH — HEALTH STABILITY: PHYSICAL HEALTH: ON AVERAGE, HOW MANY DAYS PER WEEK DO YOU ENGAGE IN MODERATE TO STRENUOUS EXERCISE (LIKE A BRISK WALK)?: 3 DAYS

## 2021-01-11 ASSESSMENT — ANXIETY QUESTIONNAIRES
5. BEING SO RESTLESS THAT IT IS HARD TO SIT STILL: NOT AT ALL
3. WORRYING TOO MUCH ABOUT DIFFERENT THINGS: SEVERAL DAYS
5. BEING SO RESTLESS THAT IT IS HARD TO SIT STILL: 0
4. TROUBLE RELAXING: 2
7. FEELING AFRAID AS IF SOMETHING AWFUL MIGHT HAPPEN: 0
2. NOT BEING ABLE TO STOP OR CONTROL WORRYING: 3
4. TROUBLE RELAXING: MORE THAN HALF THE DAYS
1. FEELING NERVOUS, ANXIOUS, OR ON EDGE: SEVERAL DAYS
3. WORRYING TOO MUCH ABOUT DIFFERENT THINGS: 1
7. FEELING AFRAID AS IF SOMETHING AWFUL MIGHT HAPPEN: NOT AT ALL
1. FEELING NERVOUS, ANXIOUS, OR ON EDGE: 1
6. BECOMING EASILY ANNOYED OR IRRITABLE: NEARLY EVERY DAY
GAD7 TOTAL SCORE: 10
6. BECOMING EASILY ANNOYED OR IRRITABLE: 3
2. NOT BEING ABLE TO STOP OR CONTROL WORRYING: NEARLY EVERY DAY
IF YOU CHECKED OFF ANY PROBLEMS ON THIS QUESTIONNAIRE, HOW DIFFICULT HAVE THESE PROBLEMS MADE IT FOR YOU TO DO YOUR WORK, TAKE CARE OF THINGS AT HOME, OR GET ALONG WITH OTHER PEOPLE: SOMEWHAT DIFFICULT

## 2021-01-11 ASSESSMENT — PATIENT HEALTH QUESTIONNAIRE - PHQ9
SUM OF ALL RESPONSES TO PHQ9 QUESTIONS 1 AND 2: 6
2. FEELING DOWN, DEPRESSED OR HOPELESS: NEARLY EVERY DAY
CLINICAL INTERPRETATION OF PHQ9 SCORE: SEVERE DEPRESSION
8. MOVING OR SPEAKING SO SLOWLY THAT OTHER PEOPLE COULD HAVE NOTICED. OR THE OPPOSITE, BEING SO FIGETY OR RESTLESS THAT YOU HAVE BEEN MOVING AROUND A LOT MORE THAN USUAL: MORE THAN HALF THE DAYS
3. TROUBLE FALLING OR STAYING ASLEEP OR SLEEPING TOO MUCH: NEARLY EVERY DAY
SUM OF ALL RESPONSES TO PHQ9 QUESTIONS 1 TO 9: 22
7. TROUBLE CONCENTRATING ON THINGS, SUCH AS READING THE NEWSPAPER OR WATCHING TELEVISION: NEARLY EVERY DAY
CLINICAL INTERPRETATION OF PHQ9 SCORE: FURTHER SCREENING NEEDED
4. FEELING TIRED OR HAVING LITTLE ENERGY: NEARLY EVERY DAY
CLINICAL INTERPRETATION OF PHQ2 SCORE: SEVERE DEPRESSION
1. LITTLE INTEREST OR PLEASURE IN DOING THINGS: NEARLY EVERY DAY
SUM OF ALL RESPONSES TO PHQ9 QUESTIONS 1 AND 2: 6
10. IF YOU CHECKED OFF ANY PROBLEMS, HOW DIFFICULT HAVE THESE PROBLEMS MADE IT FOR YOU TO DO YOUR WORK, TAKE CARE OF THINGS AT HOME, OR GET ALONG WITH OTHER PEOPLE: VERY DIFFICULT
9. THOUGHTS THAT YOU WOULD BE BETTER OFF DEAD, OR OF HURTING YOURSELF: MORE THAN HALF THE DAYS
5. POOR APPETITE, WEIGHT LOSS, OR OVEREATING: SEVERAL DAYS
SUM OF ALL RESPONSES TO PHQ QUESTIONS 1-9: 22
CLINICAL INTERPRETATION OF PHQ2 SCORE: FURTHER SCREENING NEEDED
6. FEELING BAD ABOUT YOURSELF - OR THAT YOU ARE A FAILURE OR HAVE LET YOURSELF OR YOUR FAMILY DOWN: MORE THAN HALF THE DAYS

## 2021-01-12 ENCOUNTER — TELEPHONE (OUTPATIENT)
Dept: SCHEDULING | Age: 61
End: 2021-01-12

## 2021-01-12 DIAGNOSIS — I10 ESSENTIAL HYPERTENSION: ICD-10-CM

## 2021-01-12 DIAGNOSIS — J32.9 CHRONIC SINUSITIS, UNSPECIFIED LOCATION: ICD-10-CM

## 2021-01-12 DIAGNOSIS — J30.2 SEASONAL ALLERGIC RHINITIS, UNSPECIFIED TRIGGER: ICD-10-CM

## 2021-01-12 DIAGNOSIS — R35.0 INCREASED URINARY FREQUENCY: ICD-10-CM

## 2021-01-12 DIAGNOSIS — J31.0 CHRONIC RHINITIS: ICD-10-CM

## 2021-01-12 DIAGNOSIS — F33.2 SEVERE EPISODE OF RECURRENT MAJOR DEPRESSIVE DISORDER, WITHOUT PSYCHOTIC FEATURES (CMD): ICD-10-CM

## 2021-01-12 RX ORDER — LOSARTAN POTASSIUM 100 MG/1
100 TABLET ORAL DAILY
Qty: 90 TABLET | Refills: 3 | Status: CANCELLED | OUTPATIENT
Start: 2021-01-12

## 2021-01-12 RX ORDER — ATENOLOL 100 MG/1
100 TABLET ORAL DAILY
Qty: 90 TABLET | Refills: 3 | Status: CANCELLED | OUTPATIENT
Start: 2021-01-12

## 2021-01-12 RX ORDER — CITALOPRAM 20 MG/1
20 TABLET ORAL DAILY
Qty: 7 TABLET | Refills: 0 | Status: CANCELLED | OUTPATIENT
Start: 2021-01-12

## 2021-01-12 RX ORDER — AMLODIPINE BESYLATE 10 MG/1
10 TABLET ORAL DAILY
Qty: 90 TABLET | Refills: 3 | Status: CANCELLED | OUTPATIENT
Start: 2021-01-12

## 2021-01-12 RX ORDER — BUPROPION HYDROCHLORIDE 150 MG/1
150 TABLET ORAL DAILY
Qty: 90 TABLET | Refills: 3 | Status: CANCELLED | OUTPATIENT
Start: 2021-01-12

## 2021-01-12 RX ORDER — MONTELUKAST SODIUM 10 MG/1
10 TABLET ORAL
Qty: 90 TABLET | Refills: 3 | Status: CANCELLED | OUTPATIENT
Start: 2021-01-12

## 2021-01-12 RX ORDER — TAMSULOSIN HYDROCHLORIDE 0.4 MG/1
0.4 CAPSULE ORAL
Qty: 30 CAPSULE | Refills: 11 | Status: CANCELLED | OUTPATIENT
Start: 2021-01-12

## 2021-01-12 RX ORDER — ATORVASTATIN CALCIUM 80 MG/1
80 TABLET, FILM COATED ORAL DAILY
Qty: 90 TABLET | Refills: 3 | Status: CANCELLED | OUTPATIENT
Start: 2021-01-12

## 2021-01-12 RX ORDER — QUETIAPINE FUMARATE 25 MG/1
25 TABLET, FILM COATED ORAL AT BEDTIME
Qty: 90 TABLET | Refills: 3 | Status: CANCELLED | OUTPATIENT
Start: 2021-01-12

## 2021-01-13 ENCOUNTER — TELEPHONE (OUTPATIENT)
Dept: SCHEDULING | Age: 61
End: 2021-01-13

## 2021-01-13 ENCOUNTER — TELEPHONE (OUTPATIENT)
Dept: INTERNAL MEDICINE | Age: 61
End: 2021-01-13

## 2021-01-13 DIAGNOSIS — B96.89 SINUSITIS, BACTERIAL: ICD-10-CM

## 2021-01-13 DIAGNOSIS — J30.2 SEASONAL ALLERGIC RHINITIS, UNSPECIFIED TRIGGER: ICD-10-CM

## 2021-01-13 DIAGNOSIS — J32.9 SINUSITIS, BACTERIAL: ICD-10-CM

## 2021-01-13 DIAGNOSIS — I10 ESSENTIAL HYPERTENSION: ICD-10-CM

## 2021-01-13 DIAGNOSIS — F33.2 SEVERE EPISODE OF RECURRENT MAJOR DEPRESSIVE DISORDER, WITHOUT PSYCHOTIC FEATURES (CMD): ICD-10-CM

## 2021-01-13 DIAGNOSIS — R35.0 INCREASED URINARY FREQUENCY: ICD-10-CM

## 2021-01-13 DIAGNOSIS — J31.0 CHRONIC RHINITIS: ICD-10-CM

## 2021-01-13 DIAGNOSIS — J32.9 CHRONIC SINUSITIS, UNSPECIFIED LOCATION: ICD-10-CM

## 2021-01-13 RX ORDER — QUETIAPINE FUMARATE 25 MG/1
25 TABLET, FILM COATED ORAL AT BEDTIME
Qty: 90 TABLET | Refills: 3 | Status: SHIPPED | OUTPATIENT
Start: 2021-01-13 | End: 2022-02-18 | Stop reason: SDUPTHER

## 2021-01-13 RX ORDER — LOSARTAN POTASSIUM 100 MG/1
100 TABLET ORAL DAILY
Qty: 90 TABLET | Refills: 3 | Status: SHIPPED | OUTPATIENT
Start: 2021-01-13 | End: 2022-02-18 | Stop reason: SDUPTHER

## 2021-01-13 RX ORDER — AMOXICILLIN AND CLAVULANATE POTASSIUM 875; 125 MG/1; MG/1
1 TABLET, FILM COATED ORAL EVERY 12 HOURS
Qty: 20 TABLET | Refills: 0 | Status: SHIPPED | OUTPATIENT
Start: 2021-01-13 | End: 2021-12-02 | Stop reason: ALTCHOICE

## 2021-01-13 RX ORDER — CITALOPRAM 20 MG/1
20 TABLET ORAL DAILY
Qty: 7 TABLET | Refills: 0 | Status: SHIPPED | OUTPATIENT
Start: 2021-01-13 | End: 2021-12-02 | Stop reason: ALTCHOICE

## 2021-01-13 RX ORDER — TAMSULOSIN HYDROCHLORIDE 0.4 MG/1
0.4 CAPSULE ORAL
Qty: 30 CAPSULE | Refills: 11 | Status: SHIPPED | OUTPATIENT
Start: 2021-01-13 | End: 2021-01-13 | Stop reason: SDUPTHER

## 2021-01-13 RX ORDER — AMOXICILLIN AND CLAVULANATE POTASSIUM 875; 125 MG/1; MG/1
1 TABLET, FILM COATED ORAL EVERY 12 HOURS
Qty: 20 TABLET | Refills: 0 | Status: SHIPPED | OUTPATIENT
Start: 2021-01-13 | End: 2021-01-13 | Stop reason: SDUPTHER

## 2021-01-13 RX ORDER — BUPROPION HYDROCHLORIDE 150 MG/1
150 TABLET ORAL DAILY
Qty: 90 TABLET | Refills: 3 | Status: SHIPPED | OUTPATIENT
Start: 2021-01-13 | End: 2021-01-13 | Stop reason: SDUPTHER

## 2021-01-13 RX ORDER — MONTELUKAST SODIUM 10 MG/1
10 TABLET ORAL
Qty: 90 TABLET | Refills: 3 | Status: SHIPPED | OUTPATIENT
Start: 2021-01-13 | End: 2022-02-18 | Stop reason: SDUPTHER

## 2021-01-13 RX ORDER — TAMSULOSIN HYDROCHLORIDE 0.4 MG/1
0.4 CAPSULE ORAL
Qty: 30 CAPSULE | Refills: 11 | Status: SHIPPED | OUTPATIENT
Start: 2021-01-13 | End: 2022-02-18 | Stop reason: SDUPTHER

## 2021-01-13 RX ORDER — QUETIAPINE FUMARATE 25 MG/1
25 TABLET, FILM COATED ORAL AT BEDTIME
Qty: 90 TABLET | Refills: 3 | Status: SHIPPED | OUTPATIENT
Start: 2021-01-13 | End: 2021-01-13 | Stop reason: SDUPTHER

## 2021-01-13 RX ORDER — AMLODIPINE BESYLATE 10 MG/1
10 TABLET ORAL DAILY
Qty: 90 TABLET | Refills: 3 | Status: SHIPPED | OUTPATIENT
Start: 2021-01-13 | End: 2021-06-16 | Stop reason: SDUPTHER

## 2021-01-13 RX ORDER — ATENOLOL 100 MG/1
100 TABLET ORAL DAILY
Qty: 90 TABLET | Refills: 3 | Status: SHIPPED | OUTPATIENT
Start: 2021-01-13 | End: 2021-01-13 | Stop reason: SDUPTHER

## 2021-01-13 RX ORDER — ATORVASTATIN CALCIUM 80 MG/1
80 TABLET, FILM COATED ORAL DAILY
Qty: 90 TABLET | Refills: 3 | Status: SHIPPED | OUTPATIENT
Start: 2021-01-13 | End: 2022-02-18 | Stop reason: SDUPTHER

## 2021-01-13 RX ORDER — ATENOLOL 100 MG/1
100 TABLET ORAL DAILY
Qty: 90 TABLET | Refills: 3 | Status: SHIPPED | OUTPATIENT
Start: 2021-01-13 | End: 2022-02-18 | Stop reason: SDUPTHER

## 2021-01-13 RX ORDER — MONTELUKAST SODIUM 10 MG/1
10 TABLET ORAL
Qty: 90 TABLET | Refills: 3 | Status: SHIPPED | OUTPATIENT
Start: 2021-01-13 | End: 2021-01-13 | Stop reason: SDUPTHER

## 2021-01-13 RX ORDER — LOSARTAN POTASSIUM 100 MG/1
100 TABLET ORAL DAILY
Qty: 90 TABLET | Refills: 3 | Status: SHIPPED | OUTPATIENT
Start: 2021-01-13 | End: 2021-01-13 | Stop reason: SDUPTHER

## 2021-01-13 RX ORDER — AMLODIPINE BESYLATE 10 MG/1
10 TABLET ORAL DAILY
Qty: 90 TABLET | Refills: 3 | Status: SHIPPED | OUTPATIENT
Start: 2021-01-13 | End: 2021-01-13 | Stop reason: SDUPTHER

## 2021-01-13 RX ORDER — BUPROPION HYDROCHLORIDE 150 MG/1
150 TABLET ORAL DAILY
Qty: 90 TABLET | Refills: 3 | Status: SHIPPED | OUTPATIENT
Start: 2021-01-13 | End: 2021-12-13 | Stop reason: DRUGHIGH

## 2021-01-13 RX ORDER — ATORVASTATIN CALCIUM 80 MG/1
80 TABLET, FILM COATED ORAL DAILY
Qty: 90 TABLET | Refills: 3 | Status: SHIPPED | OUTPATIENT
Start: 2021-01-13 | End: 2021-01-13 | Stop reason: SDUPTHER

## 2021-01-15 ENCOUNTER — HEALTH MAINTENANCE LETTER (OUTPATIENT)
Age: 61
End: 2021-01-15

## 2021-01-16 ENCOUNTER — APPOINTMENT (OUTPATIENT)
Dept: REHABILITATION | Age: 61
End: 2021-01-16
Attending: FAMILY MEDICINE

## 2021-01-29 ENCOUNTER — HOSPITAL ENCOUNTER (OUTPATIENT)
Dept: REHABILITATION | Age: 61
Discharge: STILL ADMITTED OR ADMINISTRATIVE | End: 2021-01-29
Attending: FAMILY MEDICINE

## 2021-01-29 PROCEDURE — 97110 THERAPEUTIC EXERCISES: CPT | Performed by: PHYSICAL THERAPIST

## 2021-01-29 PROCEDURE — 97162 PT EVAL MOD COMPLEX 30 MIN: CPT | Performed by: PHYSICAL THERAPIST

## 2021-01-29 ASSESSMENT — MOVEMENT AND STRENGTH ASSESSMENTS
HOPPING: A LITTLE BIT OF DIFFICULTY
MAKING SHARP TURNS WHILE RUNNING FAST: QUITE A BIT OF DIFFICULTY
WALKING 2 BLOCKS: MODERATE DIFFICULTY
TOTAL SCORE: 51.25
ROLLING OVER IN BED: A LITTLE BIT OF DIFFICULTY
GOING UP OR DOWN 10 STAIRS (ABOUT 1 FLIGHT OF STAIRS): QUITE A BIT OF DIFFICULTY
RUNNING ON UNEVEN GROUND: QUITE A BIT OF DIFFICULTY
SQUATTING: QUITE A BIT OF DIFFICULTY
PUTTING ON YOUR SHOES OR SOCKS: MODERATE DIFFICULTY
GETTING INTO OR OUT OF THE BATH: QUITE A BIT OF DIFFICULTY
YOUR USUAL HOBBIES, RECREATIONAL OR SPORTING ACTIVIITIES: MODERATE DIFFICULTY
PERFORMING LIGHT ACTIVITES AROUND YOUR HOME: A LITTLE BIT OF DIFFICULTY
ANY OF YOUR USUAL WORK, HOUSEWORK OR SCHOOL ACTIVITIES: MODERATE DIFFICULTY
SITTING FOR 1 HOUR: MODERATE DIFFICULTY
LIFTING AN OBJECT, LIKE A BAG OF GROCERIES, FROM THE FLOOR: A LITTLE BIT OF DIFFICULTY
STANDING FOR 1 HOUR: NO DIFFICULTY
WALKING BETWEEN ROOMS: A LITTLE BIT OF DIFFICULTY
RUNNING ON EVEN GROUND: QUITE A BIT OF DIFFICULTY
WALKING A MILE: MODERATE DIFFICULTY
GETTING INTO OR OUT OF A CAR: MODERATE DIFFICULTY
PERFORMING HEAVY ACTIVITIES AROUND YOUR HOME: MODERATE DIFFICULTY

## 2021-02-04 ASSESSMENT — ENCOUNTER SYMPTOMS
CONSTIPATION: 0
BLURRED VISION: 0
DEPRESSION: 1
DECREASED APPETITE: 0
DIFFICULTY WITH CONCENTRATION: 0
COUGH: 0
INSOMNIA: 0
EXCESSIVE APPETITE: 0
DIARRHEA: 0
CHILLS: 0
SHORTNESS OF BREATH: 0
WEIGHT GAIN: 0
WEAKNESS: 0
NERVOUS/ANXIOUS: 1
NEAR-SYNCOPE: 0
HEADACHES: 0
FEVER: 0
MEMORY LOSS: 0
PHOTOPHOBIA: 0
EYE PAIN: 0
SORE THROAT: 0
ADENOPATHY: 0
ALTERED MENTAL STATUS: 0
BRUISES/BLEEDS EASILY: 0
BLOATING: 0
BACK PAIN: 1
ABDOMINAL PAIN: 0
DIZZINESS: 0
VERTIGO: 0
POOR WOUND HEALING: 0
EYE DISCHARGE: 0

## 2021-02-04 ASSESSMENT — LIFESTYLE VARIABLES: SUBSTANCE_ABUSE: 0

## 2021-02-05 ENCOUNTER — HOSPITAL ENCOUNTER (OUTPATIENT)
Dept: REHABILITATION | Age: 61
Discharge: STILL ADMITTED OR ADMINISTRATIVE | End: 2021-02-05
Attending: FAMILY MEDICINE

## 2021-02-05 PROCEDURE — 97110 THERAPEUTIC EXERCISES: CPT | Performed by: PHYSICAL THERAPY ASSISTANT

## 2021-02-11 ENCOUNTER — HOSPITAL ENCOUNTER (OUTPATIENT)
Dept: REHABILITATION | Age: 61
Discharge: STILL ADMITTED OR ADMINISTRATIVE | End: 2021-02-11
Attending: FAMILY MEDICINE

## 2021-02-11 PROCEDURE — 97110 THERAPEUTIC EXERCISES: CPT | Performed by: PHYSICAL THERAPIST

## 2021-02-11 PROCEDURE — 97014 ELECTRIC STIMULATION THERAPY: CPT | Performed by: PHYSICAL THERAPIST

## 2021-02-19 ENCOUNTER — APPOINTMENT (OUTPATIENT)
Dept: REHABILITATION | Age: 61
End: 2021-02-19
Attending: FAMILY MEDICINE

## 2021-02-26 ENCOUNTER — APPOINTMENT (OUTPATIENT)
Dept: REHABILITATION | Age: 61
End: 2021-02-26
Attending: FAMILY MEDICINE

## 2021-04-09 ENCOUNTER — APPOINTMENT (OUTPATIENT)
Dept: REHABILITATION | Age: 61
End: 2021-04-09
Attending: FAMILY MEDICINE

## 2021-05-26 VITALS
HEART RATE: 83 BPM | SYSTOLIC BLOOD PRESSURE: 143 MMHG | DIASTOLIC BLOOD PRESSURE: 90 MMHG | RESPIRATION RATE: 20 BRPM | WEIGHT: 268.3 LBS | DIASTOLIC BLOOD PRESSURE: 87 MMHG | HEIGHT: 68 IN | BODY MASS INDEX: 40.66 KG/M2 | HEART RATE: 108 BPM | BODY MASS INDEX: 40.71 KG/M2 | SYSTOLIC BLOOD PRESSURE: 146 MMHG | RESPIRATION RATE: 18 BRPM | WEIGHT: 267.75 LBS | OXYGEN SATURATION: 100 % | HEIGHT: 68 IN | HEART RATE: 84 BPM | SYSTOLIC BLOOD PRESSURE: 133 MMHG | WEIGHT: 261.13 LBS | OXYGEN SATURATION: 99 % | TEMPERATURE: 98.4 F | BODY MASS INDEX: 39.58 KG/M2 | TEMPERATURE: 98.6 F | DIASTOLIC BLOOD PRESSURE: 94 MMHG | OXYGEN SATURATION: 97 % | TEMPERATURE: 98.5 F

## 2021-06-16 DIAGNOSIS — I10 ESSENTIAL HYPERTENSION: ICD-10-CM

## 2021-06-16 RX ORDER — AMLODIPINE BESYLATE 10 MG/1
10 TABLET ORAL DAILY
Qty: 90 TABLET | Refills: 1 | Status: SHIPPED | OUTPATIENT
Start: 2021-06-16 | End: 2021-12-13

## 2021-07-01 ENCOUNTER — TELEPHONE (OUTPATIENT)
Dept: RHEUMATOLOGY | Age: 61
End: 2021-07-01

## 2021-07-08 ENCOUNTER — TELEPHONE (OUTPATIENT)
Dept: INTERNAL MEDICINE | Age: 61
End: 2021-07-08

## 2021-09-19 ENCOUNTER — HEALTH MAINTENANCE LETTER (OUTPATIENT)
Age: 61
End: 2021-09-19

## 2021-11-24 ENCOUNTER — LAB (OUTPATIENT)
Dept: LAB | Facility: CLINIC | Age: 61
End: 2021-11-24
Payer: COMMERCIAL

## 2021-11-24 DIAGNOSIS — C61 PROSTATE CANCER (H): Primary | ICD-10-CM

## 2021-11-24 LAB — PSA SERPL-MCNC: <0.01 UG/L (ref 0–4)

## 2021-11-24 PROCEDURE — 36415 COLL VENOUS BLD VENIPUNCTURE: CPT

## 2021-11-24 PROCEDURE — G0103 PSA SCREENING: HCPCS

## 2021-12-02 ENCOUNTER — TELEPHONE (OUTPATIENT)
Dept: FAMILY MEDICINE | Age: 61
End: 2021-12-02

## 2021-12-02 ENCOUNTER — OFFICE VISIT (OUTPATIENT)
Dept: FAMILY MEDICINE | Age: 61
End: 2021-12-02

## 2021-12-02 VITALS
DIASTOLIC BLOOD PRESSURE: 101 MMHG | BODY MASS INDEX: 38.77 KG/M2 | SYSTOLIC BLOOD PRESSURE: 160 MMHG | HEART RATE: 86 BPM | WEIGHT: 255 LBS | TEMPERATURE: 96.5 F

## 2021-12-02 DIAGNOSIS — C61 PROSTATE CANCER (CMD): ICD-10-CM

## 2021-12-02 DIAGNOSIS — J32.9 CHRONIC SINUSITIS, UNSPECIFIED LOCATION: ICD-10-CM

## 2021-12-02 DIAGNOSIS — F33.2 SEVERE EPISODE OF RECURRENT MAJOR DEPRESSIVE DISORDER, WITHOUT PSYCHOTIC FEATURES (CMD): ICD-10-CM

## 2021-12-02 DIAGNOSIS — F10.10 ALCOHOL USE DISORDER, MILD, ABUSE: ICD-10-CM

## 2021-12-02 DIAGNOSIS — G47.33 OSA (OBSTRUCTIVE SLEEP APNEA): ICD-10-CM

## 2021-12-02 DIAGNOSIS — M25.562 CHRONIC PAIN OF LEFT KNEE: ICD-10-CM

## 2021-12-02 DIAGNOSIS — I10 ESSENTIAL HYPERTENSION: ICD-10-CM

## 2021-12-02 DIAGNOSIS — R35.0 FREQUENCY OF URINATION AND POLYURIA: ICD-10-CM

## 2021-12-02 DIAGNOSIS — R35.89 FREQUENCY OF URINATION AND POLYURIA: ICD-10-CM

## 2021-12-02 DIAGNOSIS — G89.29 CHRONIC PAIN OF LEFT KNEE: ICD-10-CM

## 2021-12-02 DIAGNOSIS — Z00.00 ENCOUNTER FOR GENERAL ADULT MEDICAL EXAMINATION W/O ABNORMAL FINDINGS: Primary | ICD-10-CM

## 2021-12-02 DIAGNOSIS — F17.200 CURRENT SMOKER: ICD-10-CM

## 2021-12-02 LAB
APPEARANCE, POC: CLEAR
BILIRUBIN, POC: NEGATIVE
COLOR, POC: YELLOW
GLUCOSE UR-MCNC: NEGATIVE MG/DL
KETONES, POC: NEGATIVE MG/DL
NITRITE, POC: NEGATIVE
OCCULT BLOOD, POC: NEGATIVE
PH UR: 5 [PH] (ref 5–7)
PROT UR-MCNC: NEGATIVE MG/DL
SP GR UR: 1.01 (ref 1–1.03)
UROBILINOGEN UR-MCNC: NORMAL MG/DL (ref 0–1)
WBC (LEUKOCYTE) ESTERASE, POC: NEGATIVE

## 2021-12-02 PROCEDURE — 82043 UR ALBUMIN QUANTITATIVE: CPT | Performed by: PSYCHIATRY & NEUROLOGY

## 2021-12-02 PROCEDURE — 90686 IIV4 VACC NO PRSV 0.5 ML IM: CPT | Performed by: STUDENT IN AN ORGANIZED HEALTH CARE EDUCATION/TRAINING PROGRAM

## 2021-12-02 PROCEDURE — 3080F DIAST BP >= 90 MM HG: CPT | Performed by: STUDENT IN AN ORGANIZED HEALTH CARE EDUCATION/TRAINING PROGRAM

## 2021-12-02 PROCEDURE — 3077F SYST BP >= 140 MM HG: CPT | Performed by: STUDENT IN AN ORGANIZED HEALTH CARE EDUCATION/TRAINING PROGRAM

## 2021-12-02 PROCEDURE — 90471 IMMUNIZATION ADMIN: CPT | Performed by: STUDENT IN AN ORGANIZED HEALTH CARE EDUCATION/TRAINING PROGRAM

## 2021-12-02 PROCEDURE — 81003 URINALYSIS AUTO W/O SCOPE: CPT | Performed by: STUDENT IN AN ORGANIZED HEALTH CARE EDUCATION/TRAINING PROGRAM

## 2021-12-02 PROCEDURE — 99396 PREV VISIT EST AGE 40-64: CPT | Performed by: STUDENT IN AN ORGANIZED HEALTH CARE EDUCATION/TRAINING PROGRAM

## 2021-12-02 PROCEDURE — 90750 HZV VACC RECOMBINANT IM: CPT | Performed by: STUDENT IN AN ORGANIZED HEALTH CARE EDUCATION/TRAINING PROGRAM

## 2021-12-02 PROCEDURE — 90472 IMMUNIZATION ADMIN EACH ADD: CPT | Performed by: STUDENT IN AN ORGANIZED HEALTH CARE EDUCATION/TRAINING PROGRAM

## 2021-12-02 PROCEDURE — 96127 BRIEF EMOTIONAL/BEHAV ASSMT: CPT | Performed by: STUDENT IN AN ORGANIZED HEALTH CARE EDUCATION/TRAINING PROGRAM

## 2021-12-02 PROCEDURE — 81003 URINALYSIS AUTO W/O SCOPE: CPT | Performed by: PSYCHIATRY & NEUROLOGY

## 2021-12-02 PROCEDURE — 82570 ASSAY OF URINE CREATININE: CPT | Performed by: PSYCHIATRY & NEUROLOGY

## 2021-12-02 PROCEDURE — 99214 OFFICE O/P EST MOD 30 MIN: CPT | Performed by: STUDENT IN AN ORGANIZED HEALTH CARE EDUCATION/TRAINING PROGRAM

## 2021-12-02 ASSESSMENT — PATIENT HEALTH QUESTIONNAIRE - PHQ9
2. FEELING DOWN, DEPRESSED OR HOPELESS: NEARLY EVERY DAY
7. TROUBLE CONCENTRATING ON THINGS, SUCH AS READING THE NEWSPAPER OR WATCHING TELEVISION: NEARLY EVERY DAY
SUM OF ALL RESPONSES TO PHQ9 QUESTIONS 1 AND 2: 5
SUM OF ALL RESPONSES TO PHQ QUESTIONS 1-9: 18
4. FEELING TIRED OR HAVING LITTLE ENERGY: NEARLY EVERY DAY
8. MOVING OR SPEAKING SO SLOWLY THAT OTHER PEOPLE COULD HAVE NOTICED. OR THE OPPOSITE, BEING SO FIGETY OR RESTLESS THAT YOU HAVE BEEN MOVING AROUND A LOT MORE THAN USUAL: NOT AT ALL
CLINICAL INTERPRETATION OF PHQ9 SCORE: MODERATELY SEVERE DEPRESSION
3. TROUBLE FALLING OR STAYING ASLEEP OR SLEEPING TOO MUCH: SEVERAL DAYS
9. THOUGHTS THAT YOU WOULD BE BETTER OFF DEAD, OR OF HURTING YOURSELF: SEVERAL DAYS
CLINICAL INTERPRETATION OF PHQ2 SCORE: FURTHER SCREENING NEEDED
5. POOR APPETITE, WEIGHT LOSS, OR OVEREATING: NEARLY EVERY DAY
SUM OF ALL RESPONSES TO PHQ9 QUESTIONS 1 AND 2: 5
6. FEELING BAD ABOUT YOURSELF - OR THAT YOU ARE A FAILURE OR HAVE LET YOURSELF OR YOUR FAMILY DOWN: MORE THAN HALF THE DAYS
1. LITTLE INTEREST OR PLEASURE IN DOING THINGS: MORE THAN HALF THE DAYS

## 2021-12-03 ENCOUNTER — HOSPITAL ENCOUNTER (OUTPATIENT)
Dept: GENERAL RADIOLOGY | Age: 61
Discharge: HOME | End: 2021-12-03

## 2021-12-03 ENCOUNTER — LAB SERVICES (OUTPATIENT)
Dept: LAB | Age: 61
End: 2021-12-03

## 2021-12-03 DIAGNOSIS — G89.29 CHRONIC PAIN OF LEFT KNEE: ICD-10-CM

## 2021-12-03 DIAGNOSIS — R77.1 ELEVATED SERUM GLOBULIN LEVEL: ICD-10-CM

## 2021-12-03 DIAGNOSIS — G89.29 CHRONIC BILATERAL LOW BACK PAIN WITHOUT SCIATICA: ICD-10-CM

## 2021-12-03 DIAGNOSIS — R77.1 ELEVATED SERUM GLOBULIN LEVEL: Primary | ICD-10-CM

## 2021-12-03 DIAGNOSIS — Z00.00 ENCOUNTER FOR GENERAL ADULT MEDICAL EXAMINATION W/O ABNORMAL FINDINGS: ICD-10-CM

## 2021-12-03 DIAGNOSIS — M25.562 CHRONIC PAIN OF LEFT KNEE: ICD-10-CM

## 2021-12-03 DIAGNOSIS — M54.50 CHRONIC BILATERAL LOW BACK PAIN WITHOUT SCIATICA: ICD-10-CM

## 2021-12-03 LAB
ALBUMIN SERPL-MCNC: 2.9 G/DL (ref 3.6–5.1)
ALBUMIN/GLOB SERPL: 0.5 {RATIO} (ref 1–2.4)
ALP SERPL-CCNC: 97 UNITS/L (ref 45–117)
ALT SERPL-CCNC: 44 UNITS/L
ANION GAP SERPL CALC-SCNC: 8 MMOL/L (ref 10–20)
APPEARANCE UR: CLEAR
AST SERPL-CCNC: 30 UNITS/L
BASOPHILS # BLD: 0 K/MCL (ref 0–0.3)
BASOPHILS # BLD: 0 K/MCL (ref 0–0.3)
BASOPHILS NFR BLD: 0 %
BASOPHILS NFR BLD: 0 %
BILIRUB SERPL-MCNC: 0.3 MG/DL (ref 0.2–1)
BILIRUB UR QL STRIP: NEGATIVE
BUN SERPL-MCNC: 11 MG/DL (ref 6–20)
BUN/CREAT SERPL: 15 (ref 7–25)
CALCIUM SERPL-MCNC: 8.5 MG/DL (ref 8.4–10.2)
CHLORIDE SERPL-SCNC: 110 MMOL/L (ref 98–107)
CHOLEST SERPL-MCNC: 107 MG/DL
CHOLEST/HDLC SERPL: 2.8 {RATIO}
CO2 SERPL-SCNC: 25 MMOL/L (ref 21–32)
COLOR UR: NORMAL
CREAT SERPL-MCNC: 0.75 MG/DL (ref 0.67–1.17)
CREAT UR-MCNC: 83.1 MG/DL
DEPRECATED RDW RBC: 47.8 FL (ref 39–50)
DEPRECATED RDW RBC: 48.7 FL (ref 39–50)
EOSINOPHIL # BLD: 0.1 K/MCL (ref 0–0.5)
EOSINOPHIL # BLD: 0.2 K/MCL (ref 0–0.5)
EOSINOPHIL NFR BLD: 2 %
EOSINOPHIL NFR BLD: 3 %
ERYTHROCYTE [DISTWIDTH] IN BLOOD: 13.9 % (ref 11–15)
ERYTHROCYTE [DISTWIDTH] IN BLOOD: 14 % (ref 11–15)
FASTING DURATION TIME PATIENT: 12 HOURS (ref 0–999)
FASTING DURATION TIME PATIENT: 12 HOURS (ref 0–999)
GFR SERPLBLD BASED ON 1.73 SQ M-ARVRAT: >90 ML/MIN
GLOBULIN SER-MCNC: 6.1 G/DL (ref 2–4)
GLUCOSE SERPL-MCNC: 97 MG/DL (ref 70–99)
GLUCOSE UR STRIP-MCNC: NEGATIVE MG/DL
HBA1C MFR BLD: 6.3 % (ref 4.5–5.6)
HCT VFR BLD CALC: 34.6 % (ref 39–51)
HCT VFR BLD CALC: 36.1 % (ref 39–51)
HDLC SERPL-MCNC: 38 MG/DL
HGB BLD-MCNC: 11.3 G/DL (ref 13–17)
HGB BLD-MCNC: 12 G/DL (ref 13–17)
HGB UR QL STRIP: NEGATIVE
IMM GRANULOCYTES # BLD AUTO: 0 K/MCL (ref 0–0.2)
IMM GRANULOCYTES # BLD AUTO: 0 K/MCL (ref 0–0.2)
IMM GRANULOCYTES # BLD: 0 %
IMM GRANULOCYTES # BLD: 0 %
KETONES UR STRIP-MCNC: NEGATIVE MG/DL
LDLC SERPL CALC-MCNC: 53 MG/DL
LEUKOCYTE ESTERASE UR QL STRIP: NEGATIVE
LYMPHOCYTES # BLD: 0.8 K/MCL (ref 1–4)
LYMPHOCYTES # BLD: 0.8 K/MCL (ref 1–4)
LYMPHOCYTES NFR BLD: 12 %
LYMPHOCYTES NFR BLD: 13 %
MCH RBC QN AUTO: 30.9 PG (ref 26–34)
MCH RBC QN AUTO: 31.5 PG (ref 26–34)
MCHC RBC AUTO-ENTMCNC: 32.7 G/DL (ref 32–36.5)
MCHC RBC AUTO-ENTMCNC: 33.2 G/DL (ref 32–36.5)
MCV RBC AUTO: 94.5 FL (ref 78–100)
MCV RBC AUTO: 94.8 FL (ref 78–100)
MICROALBUMIN UR-MCNC: 2.96 MG/DL
MICROALBUMIN/CREAT UR: 35.6 MG/G
MONOCYTES # BLD: 0.7 K/MCL (ref 0.3–0.9)
MONOCYTES # BLD: 0.8 K/MCL (ref 0.3–0.9)
MONOCYTES NFR BLD: 11 %
MONOCYTES NFR BLD: 13 %
NEUTROPHILS # BLD: 4.5 K/MCL (ref 1.8–7.7)
NEUTROPHILS # BLD: 4.7 K/MCL (ref 1.8–7.7)
NEUTROPHILS NFR BLD: 71 %
NEUTROPHILS NFR BLD: 75 %
NITRITE UR QL STRIP: NEGATIVE
NONHDLC SERPL-MCNC: 69 MG/DL
NRBC BLD MANUAL-RTO: 0 /100 WBC
NRBC BLD MANUAL-RTO: 0 /100 WBC
PH UR STRIP: 6 [PH] (ref 5–7)
PLATELET # BLD AUTO: 248 K/MCL (ref 140–450)
PLATELET # BLD AUTO: 256 K/MCL (ref 140–450)
POTASSIUM SERPL-SCNC: 4.3 MMOL/L (ref 3.4–5.1)
PROT SERPL-MCNC: 9 G/DL (ref 6.4–8.2)
PROT UR STRIP-MCNC: NEGATIVE MG/DL
RBC # BLD: 3.66 MIL/MCL (ref 4.5–5.9)
RBC # BLD: 3.81 MIL/MCL (ref 4.5–5.9)
SODIUM SERPL-SCNC: 139 MMOL/L (ref 135–145)
SP GR UR STRIP: 1.02 (ref 1–1.03)
TRIGL SERPL-MCNC: 78 MG/DL
UROBILINOGEN UR STRIP-MCNC: 0.2 MG/DL
WBC # BLD: 6.3 K/MCL (ref 4.2–11)
WBC # BLD: 6.4 K/MCL (ref 4.2–11)

## 2021-12-03 PROCEDURE — 83036 HEMOGLOBIN GLYCOSYLATED A1C: CPT | Performed by: PSYCHIATRY & NEUROLOGY

## 2021-12-03 PROCEDURE — 72120 X-RAY BEND ONLY L-S SPINE: CPT

## 2021-12-03 PROCEDURE — 85025 COMPLETE CBC W/AUTO DIFF WBC: CPT | Performed by: PSYCHIATRY & NEUROLOGY

## 2021-12-03 PROCEDURE — 80053 COMPREHEN METABOLIC PANEL: CPT | Performed by: PSYCHIATRY & NEUROLOGY

## 2021-12-03 PROCEDURE — 80061 LIPID PANEL: CPT | Performed by: PSYCHIATRY & NEUROLOGY

## 2021-12-03 PROCEDURE — 88185 FLOWCYTOMETRY/TC ADD-ON: CPT | Performed by: CLINICAL MEDICAL LABORATORY

## 2021-12-03 PROCEDURE — 73564 X-RAY EXAM KNEE 4 OR MORE: CPT

## 2021-12-03 PROCEDURE — 36415 COLL VENOUS BLD VENIPUNCTURE: CPT | Performed by: PSYCHIATRY & NEUROLOGY

## 2021-12-03 PROCEDURE — 84165 PROTEIN E-PHORESIS SERUM: CPT | Performed by: PSYCHIATRY & NEUROLOGY

## 2021-12-03 PROCEDURE — 88184 FLOWCYTOMETRY/ TC 1 MARKER: CPT | Performed by: CLINICAL MEDICAL LABORATORY

## 2021-12-05 PROBLEM — Z00.00 ENCOUNTER FOR GENERAL ADULT MEDICAL EXAMINATION W/O ABNORMAL FINDINGS: Status: ACTIVE | Noted: 2021-12-05

## 2021-12-05 PROBLEM — R35.89 FREQUENCY OF URINATION AND POLYURIA: Status: ACTIVE | Noted: 2018-06-26

## 2021-12-05 PROBLEM — F17.200 CURRENT SMOKER: Status: ACTIVE | Noted: 2021-12-05

## 2021-12-06 LAB
CASE RPRT: NORMAL
CELLULARITY ASSESSMENT SPEC FC-IMP: NORMAL
CLINICAL INFO: NORMAL
FLOW CYTOMETRY STUDY: NORMAL
Lab: NORMAL
PATH INTERP SPEC-IMP: NORMAL
SERVICE CMNT-IMP: NORMAL

## 2021-12-06 ASSESSMENT — ENCOUNTER SYMPTOMS
SHORTNESS OF BREATH: 0
POLYDIPSIA: 1
RHINORRHEA: 1
NERVOUS/ANXIOUS: 1
EYES NEGATIVE: 1
SLEEP DISTURBANCE: 1

## 2021-12-07 LAB
ALBUMIN SERPL ELPH-MCNC: 4 G/DL (ref 3.5–4.9)
ALPHA 1: 0.4 G/DL (ref 0.2–0.4)
ALPHA2 GLOB SERPL ELPH-MCNC: 0.7 G/DL (ref 0.5–0.9)
B-GLOBULIN SERPL ELPH-MCNC: 1 G/DL (ref 0.7–1.2)
GAMMA GLOB SERPL ELPH-MCNC: 2.8 G/DL (ref 0.7–1.7)
GLOBULIN SER-MCNC: 4.9 G/DL (ref 2.1–4.2)
PATH INTERP SPEC-IMP: ABNORMAL
PROT SERPL-MCNC: 8.9 G/DL (ref 6.4–8.2)

## 2021-12-09 ENCOUNTER — OFFICE VISIT (OUTPATIENT)
Dept: FAMILY MEDICINE | Age: 61
End: 2021-12-09

## 2021-12-09 VITALS
TEMPERATURE: 95.7 F | HEART RATE: 73 BPM | SYSTOLIC BLOOD PRESSURE: 133 MMHG | WEIGHT: 256 LBS | BODY MASS INDEX: 38.92 KG/M2 | DIASTOLIC BLOOD PRESSURE: 83 MMHG

## 2021-12-09 DIAGNOSIS — I10 ESSENTIAL HYPERTENSION: Primary | ICD-10-CM

## 2021-12-09 DIAGNOSIS — F33.2 SEVERE EPISODE OF RECURRENT MAJOR DEPRESSIVE DISORDER, WITHOUT PSYCHOTIC FEATURES (CMD): ICD-10-CM

## 2021-12-09 DIAGNOSIS — M17.0 PRIMARY OSTEOARTHRITIS OF BOTH KNEES: ICD-10-CM

## 2021-12-09 DIAGNOSIS — F10.10 ALCOHOL USE DISORDER, MILD, ABUSE: ICD-10-CM

## 2021-12-09 DIAGNOSIS — J32.9 CHRONIC SINUSITIS, UNSPECIFIED LOCATION: ICD-10-CM

## 2021-12-09 DIAGNOSIS — R73.03 PREDIABETES: ICD-10-CM

## 2021-12-09 DIAGNOSIS — K59.00 CONSTIPATION, UNSPECIFIED CONSTIPATION TYPE: ICD-10-CM

## 2021-12-09 PROCEDURE — 3075F SYST BP GE 130 - 139MM HG: CPT | Performed by: STUDENT IN AN ORGANIZED HEALTH CARE EDUCATION/TRAINING PROGRAM

## 2021-12-09 PROCEDURE — 99214 OFFICE O/P EST MOD 30 MIN: CPT | Performed by: STUDENT IN AN ORGANIZED HEALTH CARE EDUCATION/TRAINING PROGRAM

## 2021-12-09 PROCEDURE — 90471 IMMUNIZATION ADMIN: CPT | Performed by: STUDENT IN AN ORGANIZED HEALTH CARE EDUCATION/TRAINING PROGRAM

## 2021-12-09 PROCEDURE — 90715 TDAP VACCINE 7 YRS/> IM: CPT | Performed by: STUDENT IN AN ORGANIZED HEALTH CARE EDUCATION/TRAINING PROGRAM

## 2021-12-09 PROCEDURE — 3079F DIAST BP 80-89 MM HG: CPT | Performed by: STUDENT IN AN ORGANIZED HEALTH CARE EDUCATION/TRAINING PROGRAM

## 2021-12-09 RX ORDER — ASPIRIN 81 MG/1
81 TABLET, CHEWABLE ORAL
COMMUNITY
End: 2022-02-22 | Stop reason: SDUPTHER

## 2021-12-09 RX ORDER — POLYETHYLENE GLYCOL 3350 17 G/17G
17 POWDER, FOR SOLUTION ORAL DAILY
Qty: 30 PACKET | Refills: 0 | Status: SHIPPED | OUTPATIENT
Start: 2021-12-09 | End: 2022-02-22 | Stop reason: SDUPTHER

## 2021-12-09 RX ORDER — PREDNISONE 10 MG/1
10 TABLET ORAL
COMMUNITY
End: 2022-02-22 | Stop reason: ALTCHOICE

## 2021-12-09 RX ORDER — AMOXICILLIN 250 MG
1 CAPSULE ORAL DAILY
Qty: 30 TABLET | Refills: 0 | Status: SHIPPED | OUTPATIENT
Start: 2021-12-09 | End: 2022-05-11 | Stop reason: ALTCHOICE

## 2021-12-09 RX ORDER — AZELASTINE 1 MG/ML
1 SPRAY, METERED NASAL 2 TIMES DAILY
COMMUNITY
End: 2023-02-10 | Stop reason: ALTCHOICE

## 2021-12-12 ASSESSMENT — ENCOUNTER SYMPTOMS: CONSTIPATION: 1

## 2021-12-13 ENCOUNTER — TELEPHONE (OUTPATIENT)
Dept: BEHAVIORAL HEALTH | Age: 61
End: 2021-12-13

## 2021-12-13 ENCOUNTER — V-VISIT (OUTPATIENT)
Dept: BEHAVIORAL HEALTH | Age: 61
End: 2021-12-13

## 2021-12-13 DIAGNOSIS — F10.10 ALCOHOL USE DISORDER, MILD, ABUSE: ICD-10-CM

## 2021-12-13 DIAGNOSIS — F41.9 ANXIETY DISORDER, UNSPECIFIED TYPE: ICD-10-CM

## 2021-12-13 DIAGNOSIS — I10 ESSENTIAL HYPERTENSION: ICD-10-CM

## 2021-12-13 DIAGNOSIS — F33.1 MODERATE EPISODE OF RECURRENT MAJOR DEPRESSIVE DISORDER (CMD): Primary | ICD-10-CM

## 2021-12-13 DIAGNOSIS — G47.00 INSOMNIA, UNSPECIFIED TYPE: ICD-10-CM

## 2021-12-13 PROCEDURE — 90792 PSYCH DIAG EVAL W/MED SRVCS: CPT | Performed by: PSYCHIATRY & NEUROLOGY

## 2021-12-13 RX ORDER — BUPROPION HYDROCHLORIDE 300 MG/1
300 TABLET ORAL DAILY
Qty: 30 TABLET | Refills: 2 | Status: SHIPPED | OUTPATIENT
Start: 2021-12-13 | End: 2022-01-13 | Stop reason: SDUPTHER

## 2021-12-13 RX ORDER — AMLODIPINE BESYLATE 10 MG/1
TABLET ORAL
Qty: 30 TABLET | Refills: 5 | Status: SHIPPED | OUTPATIENT
Start: 2021-12-13 | End: 2022-02-18 | Stop reason: SDUPTHER

## 2021-12-16 ENCOUNTER — HOSPITAL ENCOUNTER (OUTPATIENT)
Dept: CT IMAGING | Age: 61
Discharge: HOME | End: 2021-12-16
Attending: STUDENT IN AN ORGANIZED HEALTH CARE EDUCATION/TRAINING PROGRAM

## 2021-12-16 DIAGNOSIS — F17.200 CURRENT SMOKER: ICD-10-CM

## 2021-12-16 PROCEDURE — 71271 CT THORAX LUNG CANCER SCR C-: CPT

## 2022-01-01 ENCOUNTER — EXTERNAL RECORD (OUTPATIENT)
Dept: OTHER | Age: 62
End: 2022-01-01

## 2022-01-08 DIAGNOSIS — F33.2 SEVERE EPISODE OF RECURRENT MAJOR DEPRESSIVE DISORDER, WITHOUT PSYCHOTIC FEATURES (CMD): Primary | ICD-10-CM

## 2022-01-13 RX ORDER — BUPROPION HYDROCHLORIDE 150 MG/1
TABLET ORAL
Qty: 90 TABLET | Refills: 0 | Status: SHIPPED | OUTPATIENT
Start: 2022-01-13 | End: 2022-05-11 | Stop reason: ALTCHOICE

## 2022-02-18 DIAGNOSIS — I10 ESSENTIAL HYPERTENSION: ICD-10-CM

## 2022-02-18 DIAGNOSIS — J30.2 SEASONAL ALLERGIC RHINITIS, UNSPECIFIED TRIGGER: ICD-10-CM

## 2022-02-18 DIAGNOSIS — J32.9 CHRONIC SINUSITIS, UNSPECIFIED LOCATION: ICD-10-CM

## 2022-02-18 DIAGNOSIS — R35.0 INCREASED URINARY FREQUENCY: ICD-10-CM

## 2022-02-18 DIAGNOSIS — F33.2 SEVERE EPISODE OF RECURRENT MAJOR DEPRESSIVE DISORDER, WITHOUT PSYCHOTIC FEATURES (CMD): ICD-10-CM

## 2022-02-18 DIAGNOSIS — J31.0 CHRONIC RHINITIS: ICD-10-CM

## 2022-02-20 RX ORDER — AMLODIPINE BESYLATE 10 MG/1
10 TABLET ORAL DAILY
Qty: 30 TABLET | Refills: 5 | Status: SHIPPED | OUTPATIENT
Start: 2022-02-20 | End: 2022-12-14

## 2022-02-20 RX ORDER — ATORVASTATIN CALCIUM 80 MG/1
80 TABLET, FILM COATED ORAL DAILY
Qty: 90 TABLET | Refills: 3 | Status: SHIPPED | OUTPATIENT
Start: 2022-02-20 | End: 2023-05-23

## 2022-02-20 RX ORDER — QUETIAPINE FUMARATE 25 MG/1
25 TABLET, FILM COATED ORAL AT BEDTIME
Qty: 90 TABLET | Refills: 3 | Status: SHIPPED | OUTPATIENT
Start: 2022-02-20 | End: 2023-03-03

## 2022-02-20 RX ORDER — LOSARTAN POTASSIUM 100 MG/1
100 TABLET ORAL DAILY
Qty: 90 TABLET | Refills: 3 | Status: SHIPPED | OUTPATIENT
Start: 2022-02-20 | End: 2023-04-07

## 2022-02-20 RX ORDER — ATENOLOL 100 MG/1
100 TABLET ORAL DAILY
Qty: 90 TABLET | Refills: 3 | Status: SHIPPED | OUTPATIENT
Start: 2022-02-20 | End: 2022-05-10 | Stop reason: ALTCHOICE

## 2022-02-20 RX ORDER — TAMSULOSIN HYDROCHLORIDE 0.4 MG/1
0.4 CAPSULE ORAL
Qty: 30 CAPSULE | Refills: 11 | Status: SHIPPED | OUTPATIENT
Start: 2022-02-20 | End: 2023-02-10 | Stop reason: ALTCHOICE

## 2022-02-20 RX ORDER — MONTELUKAST SODIUM 10 MG/1
10 TABLET ORAL
Qty: 90 TABLET | Refills: 3 | Status: SHIPPED | OUTPATIENT
Start: 2022-02-20 | End: 2023-03-03

## 2022-02-21 ENCOUNTER — TELEPHONE (OUTPATIENT)
Dept: SCHEDULING | Age: 62
End: 2022-02-21

## 2022-02-22 ENCOUNTER — OFFICE VISIT (OUTPATIENT)
Dept: FAMILY MEDICINE | Age: 62
End: 2022-02-22

## 2022-02-22 VITALS
WEIGHT: 258 LBS | DIASTOLIC BLOOD PRESSURE: 83 MMHG | BODY MASS INDEX: 39.23 KG/M2 | HEART RATE: 75 BPM | TEMPERATURE: 96.6 F | SYSTOLIC BLOOD PRESSURE: 153 MMHG

## 2022-02-22 DIAGNOSIS — J44.9 CHRONIC OBSTRUCTIVE PULMONARY DISEASE, UNSPECIFIED COPD TYPE (CMD): ICD-10-CM

## 2022-02-22 DIAGNOSIS — I10 ESSENTIAL HYPERTENSION: ICD-10-CM

## 2022-02-22 DIAGNOSIS — K59.00 CONSTIPATION, UNSPECIFIED CONSTIPATION TYPE: ICD-10-CM

## 2022-02-22 DIAGNOSIS — M25.552 LEFT HIP PAIN: ICD-10-CM

## 2022-02-22 DIAGNOSIS — R07.9 CHEST PAIN, UNSPECIFIED TYPE: Primary | ICD-10-CM

## 2022-02-22 DIAGNOSIS — C61 PROSTATE CANCER (CMD): ICD-10-CM

## 2022-02-22 DIAGNOSIS — R59.9 ENLARGED LYMPH NODES: ICD-10-CM

## 2022-02-22 PROCEDURE — 99214 OFFICE O/P EST MOD 30 MIN: CPT | Performed by: FAMILY MEDICINE

## 2022-02-22 PROCEDURE — 3079F DIAST BP 80-89 MM HG: CPT | Performed by: STUDENT IN AN ORGANIZED HEALTH CARE EDUCATION/TRAINING PROGRAM

## 2022-02-22 PROCEDURE — 3077F SYST BP >= 140 MM HG: CPT | Performed by: STUDENT IN AN ORGANIZED HEALTH CARE EDUCATION/TRAINING PROGRAM

## 2022-02-22 RX ORDER — HYDROCHLOROTHIAZIDE 12.5 MG/1
12.5 TABLET ORAL DAILY
Qty: 90 TABLET | Refills: 1 | Status: SHIPPED | OUTPATIENT
Start: 2022-02-22 | End: 2022-08-30

## 2022-02-22 RX ORDER — BUPROPION HYDROCHLORIDE 300 MG/1
300 TABLET ORAL DAILY
COMMUNITY
Start: 2022-02-10 | End: 2022-05-11 | Stop reason: ALTCHOICE

## 2022-02-22 RX ORDER — ASPIRIN 81 MG/1
81 TABLET, CHEWABLE ORAL DAILY
Qty: 90 TABLET | Refills: 1 | Status: SHIPPED | OUTPATIENT
Start: 2022-02-22

## 2022-02-22 RX ORDER — POLYETHYLENE GLYCOL 3350 17 G/17G
17 POWDER, FOR SOLUTION ORAL DAILY
Qty: 100 EACH | Refills: 3 | Status: SHIPPED | OUTPATIENT
Start: 2022-02-22

## 2022-03-06 ENCOUNTER — HEALTH MAINTENANCE LETTER (OUTPATIENT)
Age: 62
End: 2022-03-06

## 2022-03-18 ENCOUNTER — TELEPHONE (OUTPATIENT)
Dept: CARDIOLOGY | Age: 62
End: 2022-03-18

## 2022-03-22 ENCOUNTER — TELEPHONE (OUTPATIENT)
Dept: CARDIOLOGY | Age: 62
End: 2022-03-22

## 2022-03-23 DIAGNOSIS — R07.9 CHEST PAIN, UNSPECIFIED TYPE: Primary | ICD-10-CM

## 2022-03-29 ENCOUNTER — TELEPHONE (OUTPATIENT)
Dept: SLEEP MEDICINE | Age: 62
End: 2022-03-29

## 2022-03-29 ENCOUNTER — HOSPITAL ENCOUNTER (OUTPATIENT)
Dept: CT IMAGING | Age: 62
Discharge: HOME | End: 2022-03-29
Attending: STUDENT IN AN ORGANIZED HEALTH CARE EDUCATION/TRAINING PROGRAM

## 2022-03-29 DIAGNOSIS — C61 PROSTATE CANCER (CMD): ICD-10-CM

## 2022-03-29 DIAGNOSIS — R59.9 ENLARGED LYMPH NODES: ICD-10-CM

## 2022-03-29 DIAGNOSIS — G47.33 OSA (OBSTRUCTIVE SLEEP APNEA): Primary | ICD-10-CM

## 2022-03-29 LAB
CREAT SERPL-MCNC: 0.9 MG/DL (ref 0.67–1.17)
GFR SERPLBLD BASED ON 1.73 SQ M-ARVRAT: >90 ML/MIN

## 2022-03-29 PROCEDURE — 10002805 HB CONTRAST AGENT: Performed by: STUDENT IN AN ORGANIZED HEALTH CARE EDUCATION/TRAINING PROGRAM

## 2022-03-29 PROCEDURE — G1004 CDSM NDSC: HCPCS

## 2022-03-29 PROCEDURE — 82565 ASSAY OF CREATININE: CPT

## 2022-03-29 PROCEDURE — 74177 CT ABD & PELVIS W/CONTRAST: CPT

## 2022-03-29 RX ADMIN — IOHEXOL 150 ML: 300 INJECTION, SOLUTION INTRAVENOUS at 11:39

## 2022-04-12 ENCOUNTER — LAB SERVICES (OUTPATIENT)
Dept: CARDIOLOGY | Age: 62
End: 2022-04-12

## 2022-04-12 ENCOUNTER — OFFICE VISIT (OUTPATIENT)
Dept: CARDIOLOGY | Age: 62
End: 2022-04-12

## 2022-04-12 VITALS
DIASTOLIC BLOOD PRESSURE: 92 MMHG | HEIGHT: 68 IN | HEART RATE: 80 BPM | WEIGHT: 257 LBS | TEMPERATURE: 95.8 F | OXYGEN SATURATION: 98 % | SYSTOLIC BLOOD PRESSURE: 150 MMHG | BODY MASS INDEX: 38.95 KG/M2

## 2022-04-12 DIAGNOSIS — I25.10 ASCVD (ARTERIOSCLEROTIC CARDIOVASCULAR DISEASE): ICD-10-CM

## 2022-04-12 DIAGNOSIS — R73.09 ELEVATED HEMOGLOBIN A1C: ICD-10-CM

## 2022-04-12 DIAGNOSIS — I10 ESSENTIAL HYPERTENSION: ICD-10-CM

## 2022-04-12 DIAGNOSIS — K63.5 BENIGN COLON POLYP: ICD-10-CM

## 2022-04-12 DIAGNOSIS — E66.09 CLASS 2 OBESITY DUE TO EXCESS CALORIES WITH BODY MASS INDEX (BMI) OF 39.0 TO 39.9 IN ADULT, UNSPECIFIED WHETHER SERIOUS COMORBIDITY PRESENT: ICD-10-CM

## 2022-04-12 DIAGNOSIS — E11.29 TYPE 2 DIABETES MELLITUS WITH MICROALBUMINURIA, WITHOUT LONG-TERM CURRENT USE OF INSULIN (CMD): ICD-10-CM

## 2022-04-12 DIAGNOSIS — J32.9 CHRONIC SINUSITIS, UNSPECIFIED LOCATION: ICD-10-CM

## 2022-04-12 DIAGNOSIS — I25.83 CORONARY ARTERY DISEASE DUE TO LIPID RICH PLAQUE: ICD-10-CM

## 2022-04-12 DIAGNOSIS — R80.9 TYPE 2 DIABETES MELLITUS WITH MICROALBUMINURIA, WITHOUT LONG-TERM CURRENT USE OF INSULIN (CMD): ICD-10-CM

## 2022-04-12 DIAGNOSIS — I25.10 CORONARY ARTERY DISEASE DUE TO LIPID RICH PLAQUE: Primary | ICD-10-CM

## 2022-04-12 DIAGNOSIS — I10 ESSENTIAL HYPERTENSION: Primary | ICD-10-CM

## 2022-04-12 DIAGNOSIS — I25.83 CORONARY ARTERY DISEASE DUE TO LIPID RICH PLAQUE: Primary | ICD-10-CM

## 2022-04-12 DIAGNOSIS — R07.9 CHEST PAIN, UNSPECIFIED TYPE: ICD-10-CM

## 2022-04-12 DIAGNOSIS — J01.91 ACUTE RECURRENT SINUSITIS, UNSPECIFIED LOCATION: ICD-10-CM

## 2022-04-12 DIAGNOSIS — R07.89 ATYPICAL CHEST PAIN: ICD-10-CM

## 2022-04-12 DIAGNOSIS — E78.5 DYSLIPIDEMIA: ICD-10-CM

## 2022-04-12 DIAGNOSIS — R89.9 ABNORMAL LABORATORY TEST RESULT: ICD-10-CM

## 2022-04-12 DIAGNOSIS — I25.10 CORONARY ARTERY DISEASE DUE TO LIPID RICH PLAQUE: ICD-10-CM

## 2022-04-12 PROCEDURE — 80061 LIPID PANEL: CPT | Performed by: INTERNAL MEDICINE

## 2022-04-12 PROCEDURE — 99205 OFFICE O/P NEW HI 60 MIN: CPT | Performed by: INTERNAL MEDICINE

## 2022-04-12 PROCEDURE — 82570 ASSAY OF URINE CREATININE: CPT | Performed by: INTERNAL MEDICINE

## 2022-04-12 PROCEDURE — 36415 COLL VENOUS BLD VENIPUNCTURE: CPT | Performed by: INTERNAL MEDICINE

## 2022-04-12 PROCEDURE — 85025 COMPLETE CBC W/AUTO DIFF WBC: CPT | Performed by: INTERNAL MEDICINE

## 2022-04-12 PROCEDURE — 84165 PROTEIN E-PHORESIS SERUM: CPT | Performed by: INTERNAL MEDICINE

## 2022-04-12 PROCEDURE — 84155 ASSAY OF PROTEIN SERUM: CPT | Performed by: INTERNAL MEDICINE

## 2022-04-12 PROCEDURE — 3080F DIAST BP >= 90 MM HG: CPT | Performed by: INTERNAL MEDICINE

## 2022-04-12 PROCEDURE — 3077F SYST BP >= 140 MM HG: CPT | Performed by: INTERNAL MEDICINE

## 2022-04-12 PROCEDURE — 93000 ELECTROCARDIOGRAM COMPLETE: CPT | Performed by: INTERNAL MEDICINE

## 2022-04-12 PROCEDURE — 83036 HEMOGLOBIN GLYCOSYLATED A1C: CPT | Performed by: INTERNAL MEDICINE

## 2022-04-12 PROCEDURE — 82043 UR ALBUMIN QUANTITATIVE: CPT | Performed by: INTERNAL MEDICINE

## 2022-04-12 PROCEDURE — 80053 COMPREHEN METABOLIC PANEL: CPT | Performed by: INTERNAL MEDICINE

## 2022-04-12 RX ORDER — SODIUM FLUORIDE1.1%, POTASSIUM NITRATE 5% 5.8; 57.5 MG/ML; MG/ML
GEL, DENTIFRICE DENTAL 2 TIMES DAILY
COMMUNITY
Start: 2022-03-10 | End: 2022-05-11 | Stop reason: ALTCHOICE

## 2022-04-12 RX ORDER — FLUTICASONE PROPIONATE 50 MCG
SPRAY, SUSPENSION (ML) NASAL PRN
COMMUNITY
Start: 2022-03-09

## 2022-04-12 RX ORDER — CHLORHEXIDINE GLUCONATE ORAL RINSE 1.2 MG/ML
SOLUTION DENTAL PRN
COMMUNITY
Start: 2022-03-10 | End: 2022-05-11 | Stop reason: ALTCHOICE

## 2022-04-12 ASSESSMENT — ENCOUNTER SYMPTOMS
ADENOPATHY: 1
RESPIRATORY NEGATIVE: 1
NEUROLOGICAL NEGATIVE: 1

## 2022-04-12 ASSESSMENT — PATIENT HEALTH QUESTIONNAIRE - PHQ9
SUM OF ALL RESPONSES TO PHQ9 QUESTIONS 1 AND 2: 0
2. FEELING DOWN, DEPRESSED OR HOPELESS: NOT AT ALL
1. LITTLE INTEREST OR PLEASURE IN DOING THINGS: NOT AT ALL
SUM OF ALL RESPONSES TO PHQ9 QUESTIONS 1 AND 2: 0
CLINICAL INTERPRETATION OF PHQ2 SCORE: NO FURTHER SCREENING NEEDED

## 2022-04-13 DIAGNOSIS — R89.9 ABNORMAL LABORATORY TEST RESULT: Primary | ICD-10-CM

## 2022-04-13 LAB
ALBUMIN SERPL-MCNC: 3.8 G/DL (ref 3.6–5.1)
ALBUMIN/GLOB SERPL: 0.7 {RATIO} (ref 1–2.4)
ALP SERPL-CCNC: 98 UNITS/L (ref 45–117)
ALT SERPL-CCNC: 50 UNITS/L
ANION GAP SERPL CALC-SCNC: 8 MMOL/L (ref 10–20)
AST SERPL-CCNC: 32 UNITS/L
BASOPHILS # BLD: 0 K/MCL (ref 0–0.3)
BASOPHILS NFR BLD: 1 %
BILIRUB SERPL-MCNC: 0.4 MG/DL (ref 0.2–1)
BUN SERPL-MCNC: 9 MG/DL (ref 6–20)
BUN/CREAT SERPL: 13 (ref 7–25)
CALCIUM SERPL-MCNC: 9.8 MG/DL (ref 8.4–10.2)
CHLORIDE SERPL-SCNC: 107 MMOL/L (ref 98–107)
CHOLEST SERPL-MCNC: 135 MG/DL
CHOLEST/HDLC SERPL: 3.6 {RATIO}
CO2 SERPL-SCNC: 28 MMOL/L (ref 21–32)
CREAT SERPL-MCNC: 0.71 MG/DL (ref 0.67–1.17)
CREAT UR-MCNC: 42.9 MG/DL
DEPRECATED RDW RBC: 47.7 FL (ref 39–50)
EOSINOPHIL # BLD: 0.2 K/MCL (ref 0–0.5)
EOSINOPHIL NFR BLD: 2 %
ERYTHROCYTE [DISTWIDTH] IN BLOOD: 14 % (ref 11–15)
FASTING DURATION TIME PATIENT: ABNORMAL H
FASTING DURATION TIME PATIENT: ABNORMAL H
GFR SERPLBLD BASED ON 1.73 SQ M-ARVRAT: >90 ML/MIN
GLOBULIN SER-MCNC: 5.3 G/DL (ref 2–4)
GLUCOSE SERPL-MCNC: 89 MG/DL (ref 70–99)
HBA1C MFR BLD: 6.1 % (ref 4.5–5.6)
HCT VFR BLD CALC: 40 % (ref 39–51)
HDLC SERPL-MCNC: 38 MG/DL
HGB BLD-MCNC: 12.9 G/DL (ref 13–17)
IMM GRANULOCYTES # BLD AUTO: 0 K/MCL (ref 0–0.2)
IMM GRANULOCYTES # BLD: 0 %
LDLC SERPL CALC-MCNC: 61 MG/DL
LYMPHOCYTES # BLD: 0.9 K/MCL (ref 1–4)
LYMPHOCYTES NFR BLD: 15 %
MCH RBC QN AUTO: 29.9 PG (ref 26–34)
MCHC RBC AUTO-ENTMCNC: 32.3 G/DL (ref 32–36.5)
MCV RBC AUTO: 92.6 FL (ref 78–100)
MICROALBUMIN UR-MCNC: 1.6 MG/DL
MICROALBUMIN/CREAT UR: 37.3 MG/G
MONOCYTES # BLD: 0.7 K/MCL (ref 0.3–0.9)
MONOCYTES NFR BLD: 11 %
NEUTROPHILS # BLD: 4.4 K/MCL (ref 1.8–7.7)
NEUTROPHILS NFR BLD: 71 %
NONHDLC SERPL-MCNC: 97 MG/DL
NRBC BLD MANUAL-RTO: 0 /100 WBC
PLATELET # BLD AUTO: 270 K/MCL (ref 140–450)
POTASSIUM SERPL-SCNC: 4.1 MMOL/L (ref 3.4–5.1)
PROT SERPL-MCNC: 9.1 G/DL (ref 6.4–8.2)
RBC # BLD: 4.32 MIL/MCL (ref 4.5–5.9)
SODIUM SERPL-SCNC: 139 MMOL/L (ref 135–145)
TRIGL SERPL-MCNC: 180 MG/DL
WBC # BLD: 6.2 K/MCL (ref 4.2–11)

## 2022-04-14 LAB
ALBUMIN SERPL ELPH-MCNC: 4.2 G/DL (ref 3.5–4.9)
ALPHA 1: 0.4 G/DL (ref 0.2–0.4)
ALPHA2 GLOB SERPL ELPH-MCNC: 0.7 G/DL (ref 0.5–0.9)
B-GLOBULIN SERPL ELPH-MCNC: 1 G/DL (ref 0.7–1.2)
GAMMA GLOB SERPL ELPH-MCNC: 2.6 G/DL (ref 0.7–1.7)
GLOBULIN SER-MCNC: 4.7 G/DL (ref 2.1–4.2)
PATH INTERP SPEC-IMP: ABNORMAL
PROT SERPL-MCNC: 8.9 G/DL (ref 6.4–8.2)

## 2022-04-14 RX ORDER — SPIRONOLACTONE 25 MG/1
25 TABLET ORAL DAILY
Qty: 90 TABLET | Refills: 1 | Status: SHIPPED | OUTPATIENT
Start: 2022-04-14 | End: 2022-08-04 | Stop reason: SDUPTHER

## 2022-04-15 ENCOUNTER — TELEPHONE (OUTPATIENT)
Dept: BEHAVIORAL HEALTH | Age: 62
End: 2022-04-15

## 2022-04-25 LAB
BUN SERPL-MCNC: 16 MG/DL (ref 6–25)
CALCIUM SERPL-MCNC: 9.3 MG/DL (ref 8.2–10.2)
CHLORIDE SERPL-SCNC: 107 MMOL/L (ref 98–110)
CO2 SERPL-SCNC: 28 MMOL/L (ref 21–32)
CREAT SERPL-MCNC: 1.2 MG/DL (ref 0.5–1.3)
GLUCOSE SERPL-MCNC: 133 MG/DL (ref 70–99)
LENGTH OF FAST TIME PATIENT: ABNORMAL H
POTASSIUM SERPL-SCNC: 4.1 MMOL/L (ref 3.5–5.1)
SODIUM SERPL-SCNC: 141 MMOL/L (ref 136–145)

## 2022-04-26 ENCOUNTER — TELEPHONE (OUTPATIENT)
Dept: CARDIOLOGY | Age: 62
End: 2022-04-26

## 2022-05-02 ENCOUNTER — EXTERNAL RECORD (OUTPATIENT)
Dept: OTHER | Age: 62
End: 2022-05-02

## 2022-05-06 RX ORDER — BUPROPION HYDROCHLORIDE 300 MG/1
TABLET ORAL
Qty: 30 TABLET | Refills: 2 | OUTPATIENT
Start: 2022-05-06

## 2022-05-09 ENCOUNTER — ANCILLARY PROCEDURE (OUTPATIENT)
Dept: CARDIOLOGY | Age: 62
End: 2022-05-09
Attending: INTERNAL MEDICINE

## 2022-05-09 VITALS
SYSTOLIC BLOOD PRESSURE: 136 MMHG | BODY MASS INDEX: 38.95 KG/M2 | DIASTOLIC BLOOD PRESSURE: 80 MMHG | HEIGHT: 68 IN | WEIGHT: 257 LBS

## 2022-05-09 DIAGNOSIS — E66.09 CLASS 2 OBESITY DUE TO EXCESS CALORIES WITH BODY MASS INDEX (BMI) OF 39.0 TO 39.9 IN ADULT, UNSPECIFIED WHETHER SERIOUS COMORBIDITY PRESENT: ICD-10-CM

## 2022-05-09 DIAGNOSIS — I25.10 ASCVD (ARTERIOSCLEROTIC CARDIOVASCULAR DISEASE): ICD-10-CM

## 2022-05-09 DIAGNOSIS — E78.5 DYSLIPIDEMIA: ICD-10-CM

## 2022-05-09 DIAGNOSIS — E11.29 TYPE 2 DIABETES MELLITUS WITH MICROALBUMINURIA, WITHOUT LONG-TERM CURRENT USE OF INSULIN (CMD): ICD-10-CM

## 2022-05-09 DIAGNOSIS — I25.83 CORONARY ARTERY DISEASE DUE TO LIPID RICH PLAQUE: ICD-10-CM

## 2022-05-09 DIAGNOSIS — R80.9 TYPE 2 DIABETES MELLITUS WITH MICROALBUMINURIA, WITHOUT LONG-TERM CURRENT USE OF INSULIN (CMD): ICD-10-CM

## 2022-05-09 DIAGNOSIS — R07.89 ATYPICAL CHEST PAIN: ICD-10-CM

## 2022-05-09 DIAGNOSIS — I25.10 CORONARY ARTERY DISEASE DUE TO LIPID RICH PLAQUE: ICD-10-CM

## 2022-05-09 DIAGNOSIS — I10 ESSENTIAL HYPERTENSION: ICD-10-CM

## 2022-05-09 LAB
HEART RATE RESERVE PREDICTED: 16.46 BPM
LV EF: 88 %
RESTING HR ACHIEVED: 73 BPM
STRESS BASELINE BP: NORMAL MMHG
STRESS PEAK HR: 132 BPM
STRESS PERCENT HR: 84 %
STRESS POST ESTIMATED WORKLOAD: 7 METS
STRESS POST EXERCISE DUR MIN: 6 MIN
STRESS POST EXERCISE DUR SEC: 42 SEC
STRESS POST PEAK BP: NORMAL MMHG
STRESS TARGET HR: 158 BPM

## 2022-05-09 PROCEDURE — G1004 CDSM NDSC: HCPCS | Performed by: INTERNAL MEDICINE

## 2022-05-09 PROCEDURE — 78452 HT MUSCLE IMAGE SPECT MULT: CPT | Performed by: INTERNAL MEDICINE

## 2022-05-09 PROCEDURE — A9502 TC99M TETROFOSMIN: HCPCS | Performed by: INTERNAL MEDICINE

## 2022-05-09 PROCEDURE — 93015 CV STRESS TEST SUPVJ I&R: CPT | Performed by: INTERNAL MEDICINE

## 2022-05-09 ASSESSMENT — EXERCISE STRESS TEST
STAGE_CATEGORIES: RECOVERY 1
PEAK_RPP: 17328
PEAK_HR: 114
PEAK_BP: 152/80
STOPPAGE_REASON: GENERAL FATIGUE

## 2022-05-10 ENCOUNTER — OFFICE VISIT (OUTPATIENT)
Dept: CARDIOLOGY | Age: 62
End: 2022-05-10

## 2022-05-10 ENCOUNTER — LAB SERVICES (OUTPATIENT)
Dept: CARDIOLOGY | Age: 62
End: 2022-05-10

## 2022-05-10 VITALS
HEART RATE: 75 BPM | BODY MASS INDEX: 39.25 KG/M2 | WEIGHT: 259 LBS | OXYGEN SATURATION: 98 % | HEIGHT: 68 IN | TEMPERATURE: 96.1 F | SYSTOLIC BLOOD PRESSURE: 140 MMHG | DIASTOLIC BLOOD PRESSURE: 90 MMHG

## 2022-05-10 DIAGNOSIS — R73.03 PREDIABETES: ICD-10-CM

## 2022-05-10 DIAGNOSIS — E78.5 DYSLIPIDEMIA: ICD-10-CM

## 2022-05-10 DIAGNOSIS — K63.5 BENIGN COLON POLYP: ICD-10-CM

## 2022-05-10 DIAGNOSIS — I25.10 CORONARY ARTERY DISEASE DUE TO CALCIFIED CORONARY LESION: ICD-10-CM

## 2022-05-10 DIAGNOSIS — R89.9 ABNORMAL LABORATORY TEST RESULT: ICD-10-CM

## 2022-05-10 DIAGNOSIS — R73.09 ELEVATED HEMOGLOBIN A1C: ICD-10-CM

## 2022-05-10 DIAGNOSIS — R07.9 CHEST PAIN, UNSPECIFIED TYPE: ICD-10-CM

## 2022-05-10 DIAGNOSIS — I10 ESSENTIAL HYPERTENSION: ICD-10-CM

## 2022-05-10 DIAGNOSIS — I10 ESSENTIAL HYPERTENSION: Primary | ICD-10-CM

## 2022-05-10 DIAGNOSIS — I25.84 CORONARY ARTERY DISEASE DUE TO CALCIFIED CORONARY LESION: ICD-10-CM

## 2022-05-10 PROCEDURE — 80048 BASIC METABOLIC PNL TOTAL CA: CPT | Performed by: INTERNAL MEDICINE

## 2022-05-10 PROCEDURE — 36415 COLL VENOUS BLD VENIPUNCTURE: CPT | Performed by: INTERNAL MEDICINE

## 2022-05-10 PROCEDURE — 99214 OFFICE O/P EST MOD 30 MIN: CPT | Performed by: INTERNAL MEDICINE

## 2022-05-10 PROCEDURE — 3077F SYST BP >= 140 MM HG: CPT | Performed by: INTERNAL MEDICINE

## 2022-05-10 RX ORDER — METOPROLOL SUCCINATE 50 MG/1
50 TABLET, EXTENDED RELEASE ORAL 2 TIMES DAILY
Qty: 180 TABLET | Refills: 3 | Status: SHIPPED | OUTPATIENT
Start: 2022-05-10 | End: 2023-06-07 | Stop reason: SDUPTHER

## 2022-05-10 ASSESSMENT — PATIENT HEALTH QUESTIONNAIRE - PHQ9
4. FEELING TIRED OR HAVING LITTLE ENERGY: NEARLY EVERY DAY
5. POOR APPETITE, WEIGHT LOSS, OR OVEREATING: SEVERAL DAYS
10. IF YOU CHECKED OFF ANY PROBLEMS, HOW DIFFICULT HAVE THESE PROBLEMS MADE IT FOR YOU TO DO YOUR WORK, TAKE CARE OF THINGS AT HOME, OR GET ALONG WITH OTHER PEOPLE: NOT DIFFICULT AT ALL
SUM OF ALL RESPONSES TO PHQ9 QUESTIONS 1 AND 2: 4
3. TROUBLE FALLING OR STAYING ASLEEP OR SLEEPING TOO MUCH: NOT AT ALL
SUM OF ALL RESPONSES TO PHQ9 QUESTIONS 1 AND 2: 4
2. FEELING DOWN, DEPRESSED OR HOPELESS: NEARLY EVERY DAY
7. TROUBLE CONCENTRATING ON THINGS, SUCH AS READING THE NEWSPAPER OR WATCHING TELEVISION: NOT AT ALL
6. FEELING BAD ABOUT YOURSELF - OR THAT YOU ARE A FAILURE OR HAVE LET YOURSELF OR YOUR FAMILY DOWN: NEARLY EVERY DAY
CLINICAL INTERPRETATION OF PHQ2 SCORE: FURTHER SCREENING NEEDED
1. LITTLE INTEREST OR PLEASURE IN DOING THINGS: SEVERAL DAYS
8. MOVING OR SPEAKING SO SLOWLY THAT OTHER PEOPLE COULD HAVE NOTICED. OR THE OPPOSITE, BEING SO FIGETY OR RESTLESS THAT YOU HAVE BEEN MOVING AROUND A LOT MORE THAN USUAL: NOT AT ALL

## 2022-05-10 ASSESSMENT — ENCOUNTER SYMPTOMS
NEUROLOGICAL NEGATIVE: 1
ADENOPATHY: 1
RESPIRATORY NEGATIVE: 1

## 2022-05-11 ENCOUNTER — TELEPHONE (OUTPATIENT)
Dept: BEHAVIORAL HEALTH | Age: 62
End: 2022-05-11

## 2022-05-11 ENCOUNTER — BEHAVIORAL HEALTH (OUTPATIENT)
Dept: BEHAVIORAL HEALTH | Age: 62
End: 2022-05-11

## 2022-05-11 DIAGNOSIS — F41.9 ANXIETY DISORDER, UNSPECIFIED TYPE: ICD-10-CM

## 2022-05-11 DIAGNOSIS — F33.1 MODERATE EPISODE OF RECURRENT MAJOR DEPRESSIVE DISORDER (CMD): Primary | ICD-10-CM

## 2022-05-11 DIAGNOSIS — F10.10 ALCOHOL USE DISORDER, MILD, ABUSE: ICD-10-CM

## 2022-05-11 DIAGNOSIS — G47.00 INSOMNIA, UNSPECIFIED TYPE: ICD-10-CM

## 2022-05-11 LAB
ANION GAP SERPL CALC-SCNC: 11 MMOL/L (ref 10–20)
BUN SERPL-MCNC: 14 MG/DL (ref 6–20)
BUN/CREAT SERPL: 18 (ref 7–25)
CALCIUM SERPL-MCNC: 9.5 MG/DL (ref 8.4–10.2)
CHLORIDE SERPL-SCNC: 108 MMOL/L (ref 98–107)
CO2 SERPL-SCNC: 24 MMOL/L (ref 21–32)
CREAT SERPL-MCNC: 0.8 MG/DL (ref 0.67–1.17)
FASTING DURATION TIME PATIENT: ABNORMAL H
GFR SERPLBLD BASED ON 1.73 SQ M-ARVRAT: >90 ML/MIN
GLUCOSE SERPL-MCNC: 95 MG/DL (ref 70–99)
POTASSIUM SERPL-SCNC: 4.2 MMOL/L (ref 3.4–5.1)
SODIUM SERPL-SCNC: 139 MMOL/L (ref 135–145)

## 2022-05-11 PROCEDURE — 99214 OFFICE O/P EST MOD 30 MIN: CPT | Performed by: PSYCHIATRY & NEUROLOGY

## 2022-05-11 PROCEDURE — 90833 PSYTX W PT W E/M 30 MIN: CPT | Performed by: PSYCHIATRY & NEUROLOGY

## 2022-05-11 RX ORDER — VENLAFAXINE HYDROCHLORIDE 37.5 MG/1
37.5 CAPSULE, EXTENDED RELEASE ORAL DAILY
Qty: 60 CAPSULE | Refills: 1 | Status: SHIPPED | OUTPATIENT
Start: 2022-05-11 | End: 2022-08-03 | Stop reason: SDUPTHER

## 2022-06-03 RX ORDER — VENLAFAXINE HYDROCHLORIDE 37.5 MG/1
37.5 CAPSULE, EXTENDED RELEASE ORAL DAILY
Qty: 60 CAPSULE | Refills: 1 | OUTPATIENT
Start: 2022-06-03

## 2022-06-21 ENCOUNTER — TELEPHONE (OUTPATIENT)
Dept: BEHAVIORAL HEALTH | Age: 62
End: 2022-06-21

## 2022-06-21 ENCOUNTER — BEHAVIORAL HEALTH (OUTPATIENT)
Dept: BEHAVIORAL HEALTH | Age: 62
End: 2022-06-21

## 2022-06-21 DIAGNOSIS — F33.41 RECURRENT MAJOR DEPRESSIVE DISORDER, IN PARTIAL REMISSION (CMD): ICD-10-CM

## 2022-06-21 DIAGNOSIS — F10.10 ALCOHOL USE DISORDER, MILD, ABUSE: ICD-10-CM

## 2022-06-21 DIAGNOSIS — F41.9 ANXIETY DISORDER, UNSPECIFIED TYPE: Primary | ICD-10-CM

## 2022-06-21 DIAGNOSIS — G47.00 INSOMNIA, UNSPECIFIED TYPE: ICD-10-CM

## 2022-06-21 PROCEDURE — 99214 OFFICE O/P EST MOD 30 MIN: CPT | Performed by: PSYCHIATRY & NEUROLOGY

## 2022-06-21 PROCEDURE — 90833 PSYTX W PT W E/M 30 MIN: CPT | Performed by: PSYCHIATRY & NEUROLOGY

## 2022-06-30 RX ORDER — VENLAFAXINE HYDROCHLORIDE 37.5 MG/1
37.5 CAPSULE, EXTENDED RELEASE ORAL DAILY
Qty: 60 CAPSULE | Refills: 1 | OUTPATIENT
Start: 2022-06-30

## 2022-07-05 RX ORDER — VENLAFAXINE HYDROCHLORIDE 37.5 MG/1
37.5 CAPSULE, EXTENDED RELEASE ORAL DAILY
COMMUNITY
End: 2022-07-05 | Stop reason: SDUPTHER

## 2022-07-05 RX ORDER — VENLAFAXINE HYDROCHLORIDE 37.5 MG/1
37.5 CAPSULE, EXTENDED RELEASE ORAL 2 TIMES DAILY
COMMUNITY
End: 2022-07-05 | Stop reason: SDUPTHER

## 2022-07-05 RX ORDER — VENLAFAXINE HYDROCHLORIDE 37.5 MG/1
37.5 CAPSULE, EXTENDED RELEASE ORAL DAILY
Qty: 60 CAPSULE | Refills: 1 | Status: SHIPPED | OUTPATIENT
Start: 2022-07-05 | End: 2022-08-03 | Stop reason: SDUPTHER

## 2022-07-05 RX ORDER — VENLAFAXINE HYDROCHLORIDE 37.5 MG/1
37.5 CAPSULE, EXTENDED RELEASE ORAL 2 TIMES DAILY
Qty: 60 CAPSULE | Refills: 1 | Status: SHIPPED | OUTPATIENT
Start: 2022-07-05 | End: 2022-08-03 | Stop reason: SDUPTHER

## 2022-07-20 DIAGNOSIS — Z01.812 PRE-PROCEDURE LAB EXAM: Primary | ICD-10-CM

## 2022-08-02 ENCOUNTER — TELEPHONE (OUTPATIENT)
Dept: CARDIOLOGY | Age: 62
End: 2022-08-02

## 2022-08-03 RX ORDER — VENLAFAXINE HYDROCHLORIDE 37.5 MG/1
CAPSULE, EXTENDED RELEASE ORAL
Qty: 60 CAPSULE | Refills: 1 | Status: SHIPPED | OUTPATIENT
Start: 2022-08-03 | End: 2022-09-20 | Stop reason: DRUGHIGH

## 2022-08-04 ENCOUNTER — OFFICE VISIT (OUTPATIENT)
Dept: CARDIOLOGY | Age: 62
End: 2022-08-04

## 2022-08-04 VITALS
WEIGHT: 262 LBS | HEART RATE: 94 BPM | BODY MASS INDEX: 39.71 KG/M2 | OXYGEN SATURATION: 97 % | SYSTOLIC BLOOD PRESSURE: 152 MMHG | TEMPERATURE: 96.1 F | DIASTOLIC BLOOD PRESSURE: 90 MMHG | HEIGHT: 68 IN

## 2022-08-04 DIAGNOSIS — I10 PRIMARY HYPERTENSION: Primary | ICD-10-CM

## 2022-08-04 DIAGNOSIS — E11.29 TYPE 2 DIABETES MELLITUS WITH MICROALBUMINURIA, WITHOUT LONG-TERM CURRENT USE OF INSULIN (CMD): ICD-10-CM

## 2022-08-04 DIAGNOSIS — E66.09 CLASS 2 OBESITY DUE TO EXCESS CALORIES WITH BODY MASS INDEX (BMI) OF 39.0 TO 39.9 IN ADULT, UNSPECIFIED WHETHER SERIOUS COMORBIDITY PRESENT: ICD-10-CM

## 2022-08-04 DIAGNOSIS — R80.9 TYPE 2 DIABETES MELLITUS WITH MICROALBUMINURIA, WITHOUT LONG-TERM CURRENT USE OF INSULIN (CMD): ICD-10-CM

## 2022-08-04 PROCEDURE — 99214 OFFICE O/P EST MOD 30 MIN: CPT | Performed by: INTERNAL MEDICINE

## 2022-08-04 PROCEDURE — 3077F SYST BP >= 140 MM HG: CPT | Performed by: INTERNAL MEDICINE

## 2022-08-04 RX ORDER — SPIRONOLACTONE 25 MG/1
25 TABLET ORAL DAILY
Qty: 180 TABLET | Refills: 3 | Status: SHIPPED | OUTPATIENT
Start: 2022-08-04 | End: 2022-08-26 | Stop reason: SDUPTHER

## 2022-08-04 RX ORDER — ATENOLOL 100 MG/1
TABLET ORAL
COMMUNITY
Start: 2022-05-27 | End: 2022-08-04

## 2022-08-04 RX ORDER — BUPROPION HYDROCHLORIDE 150 MG/1
TABLET ORAL
COMMUNITY
Start: 2022-07-23 | End: 2022-08-04

## 2022-08-04 ASSESSMENT — ENCOUNTER SYMPTOMS
RESPIRATORY NEGATIVE: 1
ADENOPATHY: 1
NEUROLOGICAL NEGATIVE: 1

## 2022-08-04 ASSESSMENT — PATIENT HEALTH QUESTIONNAIRE - PHQ9
CLINICAL INTERPRETATION OF PHQ2 SCORE: NO FURTHER SCREENING NEEDED
2. FEELING DOWN, DEPRESSED OR HOPELESS: NOT AT ALL
SUM OF ALL RESPONSES TO PHQ9 QUESTIONS 1 AND 2: 0
SUM OF ALL RESPONSES TO PHQ9 QUESTIONS 1 AND 2: 0
1. LITTLE INTEREST OR PLEASURE IN DOING THINGS: NOT AT ALL

## 2022-08-09 ENCOUNTER — EXTERNAL RECORD (OUTPATIENT)
Dept: HEALTH INFORMATION MANAGEMENT | Facility: OTHER | Age: 62
End: 2022-08-09

## 2022-08-26 ENCOUNTER — TELEPHONE (OUTPATIENT)
Dept: CARDIOLOGY | Age: 62
End: 2022-08-26

## 2022-08-26 RX ORDER — SPIRONOLACTONE 25 MG/1
25 TABLET ORAL 2 TIMES DAILY
Qty: 180 TABLET | Refills: 3 | Status: SHIPPED | OUTPATIENT
Start: 2022-08-26 | End: 2023-06-07 | Stop reason: SDUPTHER

## 2022-08-26 RX ORDER — SPIRONOLACTONE 25 MG/1
25 TABLET ORAL DAILY
Qty: 180 TABLET | Refills: 3 | OUTPATIENT
Start: 2022-08-26

## 2022-08-30 DIAGNOSIS — I10 ESSENTIAL HYPERTENSION: ICD-10-CM

## 2022-08-30 RX ORDER — HYDROCHLOROTHIAZIDE 12.5 MG/1
TABLET ORAL
Qty: 90 TABLET | Refills: 1 | Status: SHIPPED | OUTPATIENT
Start: 2022-08-30 | End: 2022-10-03 | Stop reason: DRUGHIGH

## 2022-09-01 DIAGNOSIS — I10 ESSENTIAL HYPERTENSION: ICD-10-CM

## 2022-09-02 RX ORDER — VENLAFAXINE HYDROCHLORIDE 37.5 MG/1
CAPSULE, EXTENDED RELEASE ORAL
Qty: 60 CAPSULE | Refills: 1 | OUTPATIENT
Start: 2022-09-02

## 2022-09-02 RX ORDER — HYDROCHLOROTHIAZIDE 12.5 MG/1
TABLET ORAL
Qty: 30 TABLET | OUTPATIENT
Start: 2022-09-02

## 2022-09-06 ENCOUNTER — TELEPHONE (OUTPATIENT)
Dept: CARDIOLOGY | Age: 62
End: 2022-09-06

## 2022-09-10 DIAGNOSIS — I10 ESSENTIAL HYPERTENSION: ICD-10-CM

## 2022-09-12 ENCOUNTER — APPOINTMENT (OUTPATIENT)
Dept: LAB | Age: 62
End: 2022-09-12

## 2022-09-12 DIAGNOSIS — Z01.812 PRE-PROCEDURE LAB EXAM: Primary | ICD-10-CM

## 2022-09-13 RX ORDER — HYDROCHLOROTHIAZIDE 12.5 MG/1
TABLET ORAL
Qty: 30 TABLET | OUTPATIENT
Start: 2022-09-13

## 2022-09-14 ENCOUNTER — APPOINTMENT (OUTPATIENT)
Dept: SLEEP MEDICINE | Age: 62
End: 2022-09-14
Attending: INTERNAL MEDICINE

## 2022-09-20 ENCOUNTER — BEHAVIORAL HEALTH (OUTPATIENT)
Dept: BEHAVIORAL HEALTH | Age: 62
End: 2022-09-20

## 2022-09-20 DIAGNOSIS — F33.42 RECURRENT MAJOR DEPRESSIVE DISORDER, IN FULL REMISSION (CMD): ICD-10-CM

## 2022-09-20 DIAGNOSIS — F41.9 ANXIETY DISORDER, UNSPECIFIED TYPE: Primary | ICD-10-CM

## 2022-09-20 DIAGNOSIS — F10.10 ALCOHOL USE DISORDER, MILD, ABUSE: ICD-10-CM

## 2022-09-20 DIAGNOSIS — G47.00 INSOMNIA, UNSPECIFIED TYPE: ICD-10-CM

## 2022-09-20 PROCEDURE — 90833 PSYTX W PT W E/M 30 MIN: CPT | Performed by: PSYCHIATRY & NEUROLOGY

## 2022-09-20 PROCEDURE — 99214 OFFICE O/P EST MOD 30 MIN: CPT | Performed by: PSYCHIATRY & NEUROLOGY

## 2022-09-20 RX ORDER — VENLAFAXINE HYDROCHLORIDE 75 MG/1
75 CAPSULE, EXTENDED RELEASE ORAL DAILY
Qty: 90 CAPSULE | Refills: 1 | Status: SHIPPED | OUTPATIENT
Start: 2022-09-20 | End: 2023-04-03 | Stop reason: SDUPTHER

## 2022-09-28 RX ORDER — TADALAFIL 5 MG/1
TABLET ORAL
COMMUNITY
Start: 2022-08-09

## 2022-09-28 RX ORDER — ALFUZOSIN HYDROCHLORIDE 10 MG/1
10 TABLET, EXTENDED RELEASE ORAL DAILY
COMMUNITY
Start: 2022-09-05 | End: 2023-02-10 | Stop reason: ALTCHOICE

## 2022-10-03 ENCOUNTER — OFFICE VISIT (OUTPATIENT)
Dept: CARDIOLOGY | Age: 62
End: 2022-10-03

## 2022-10-03 VITALS
TEMPERATURE: 95.5 F | WEIGHT: 260 LBS | DIASTOLIC BLOOD PRESSURE: 90 MMHG | SYSTOLIC BLOOD PRESSURE: 146 MMHG | BODY MASS INDEX: 39.4 KG/M2 | HEART RATE: 93 BPM | OXYGEN SATURATION: 99 % | HEIGHT: 68 IN

## 2022-10-03 DIAGNOSIS — G47.33 OSA (OBSTRUCTIVE SLEEP APNEA): ICD-10-CM

## 2022-10-03 DIAGNOSIS — E78.5 DYSLIPIDEMIA: ICD-10-CM

## 2022-10-03 DIAGNOSIS — I25.10 CORONARY ARTERY DISEASE DUE TO LIPID RICH PLAQUE: ICD-10-CM

## 2022-10-03 DIAGNOSIS — F33.2 SEVERE EPISODE OF RECURRENT MAJOR DEPRESSIVE DISORDER, WITHOUT PSYCHOTIC FEATURES (CMD): ICD-10-CM

## 2022-10-03 DIAGNOSIS — I10 ESSENTIAL HYPERTENSION: Primary | ICD-10-CM

## 2022-10-03 DIAGNOSIS — F17.200 CURRENT SMOKER: ICD-10-CM

## 2022-10-03 DIAGNOSIS — E66.09 CLASS 2 OBESITY DUE TO EXCESS CALORIES WITH BODY MASS INDEX (BMI) OF 39.0 TO 39.9 IN ADULT, UNSPECIFIED WHETHER SERIOUS COMORBIDITY PRESENT: ICD-10-CM

## 2022-10-03 DIAGNOSIS — C61 PROSTATE CANCER (CMD): ICD-10-CM

## 2022-10-03 DIAGNOSIS — I25.83 CORONARY ARTERY DISEASE DUE TO LIPID RICH PLAQUE: ICD-10-CM

## 2022-10-03 PROCEDURE — 83036 HEMOGLOBIN GLYCOSYLATED A1C: CPT | Performed by: INTERNAL MEDICINE

## 2022-10-03 PROCEDURE — 3077F SYST BP >= 140 MM HG: CPT | Performed by: INTERNAL MEDICINE

## 2022-10-03 PROCEDURE — 99214 OFFICE O/P EST MOD 30 MIN: CPT | Performed by: INTERNAL MEDICINE

## 2022-10-03 PROCEDURE — 80048 BASIC METABOLIC PNL TOTAL CA: CPT | Performed by: INTERNAL MEDICINE

## 2022-10-03 PROCEDURE — 80061 LIPID PANEL: CPT | Performed by: INTERNAL MEDICINE

## 2022-10-03 RX ORDER — HYDROCHLOROTHIAZIDE 50 MG/1
50 TABLET ORAL DAILY
Qty: 90 TABLET | Refills: 3 | Status: SHIPPED | OUTPATIENT
Start: 2022-10-03 | End: 2023-06-07 | Stop reason: SDUPTHER

## 2022-10-03 ASSESSMENT — PATIENT HEALTH QUESTIONNAIRE - PHQ9
2. FEELING DOWN, DEPRESSED OR HOPELESS: NOT AT ALL
SUM OF ALL RESPONSES TO PHQ9 QUESTIONS 1 AND 2: 0
1. LITTLE INTEREST OR PLEASURE IN DOING THINGS: NOT AT ALL
CLINICAL INTERPRETATION OF PHQ2 SCORE: NO FURTHER SCREENING NEEDED
SUM OF ALL RESPONSES TO PHQ9 QUESTIONS 1 AND 2: 0

## 2022-10-03 ASSESSMENT — ENCOUNTER SYMPTOMS
RESPIRATORY NEGATIVE: 1
ADENOPATHY: 1
NEUROLOGICAL NEGATIVE: 1

## 2022-10-04 ENCOUNTER — APPOINTMENT (OUTPATIENT)
Dept: CARDIOLOGY | Age: 62
End: 2022-10-04

## 2022-10-04 LAB
ANION GAP SERPL CALC-SCNC: 12 MMOL/L (ref 7–19)
BUN SERPL-MCNC: 14 MG/DL (ref 6–20)
BUN/CREAT SERPL: 16 (ref 7–25)
CALCIUM SERPL-MCNC: 9.6 MG/DL (ref 8.4–10.2)
CHLORIDE SERPL-SCNC: 104 MMOL/L (ref 97–110)
CHOLEST SERPL-MCNC: 133 MG/DL
CHOLEST/HDLC SERPL: 3.2 {RATIO}
CO2 SERPL-SCNC: 27 MMOL/L (ref 21–32)
CREAT SERPL-MCNC: 0.86 MG/DL (ref 0.67–1.17)
FASTING DURATION TIME PATIENT: NORMAL H
FASTING DURATION TIME PATIENT: NORMAL H
GFR SERPLBLD BASED ON 1.73 SQ M-ARVRAT: >90 ML/MIN
GLUCOSE SERPL-MCNC: 95 MG/DL (ref 70–99)
HBA1C MFR BLD: 6.2 % (ref 4.5–5.6)
HDLC SERPL-MCNC: 42 MG/DL
LDLC SERPL CALC-MCNC: 74 MG/DL
NONHDLC SERPL-MCNC: 91 MG/DL
POTASSIUM SERPL-SCNC: 4.6 MMOL/L (ref 3.4–5.1)
SODIUM SERPL-SCNC: 138 MMOL/L (ref 135–145)
TRIGL SERPL-MCNC: 86 MG/DL

## 2022-11-18 ENCOUNTER — EXTERNAL RECORD (OUTPATIENT)
Dept: HEALTH INFORMATION MANAGEMENT | Facility: OTHER | Age: 62
End: 2022-11-18

## 2022-11-21 ENCOUNTER — HEALTH MAINTENANCE LETTER (OUTPATIENT)
Age: 62
End: 2022-11-21

## 2022-11-27 ENCOUNTER — APPOINTMENT (OUTPATIENT)
Dept: LAB | Age: 62
End: 2022-11-27

## 2022-11-29 ENCOUNTER — APPOINTMENT (OUTPATIENT)
Dept: SLEEP MEDICINE | Age: 62
End: 2022-11-29
Attending: INTERNAL MEDICINE

## 2022-11-29 RX ORDER — ALBUTEROL SULFATE 90 UG/1
AEROSOL, METERED RESPIRATORY (INHALATION)
COMMUNITY
Start: 2022-11-09

## 2022-11-29 RX ORDER — BENZONATATE 200 MG/1
CAPSULE ORAL
COMMUNITY
Start: 2022-11-09 | End: 2023-04-17 | Stop reason: ALTCHOICE

## 2022-12-05 ENCOUNTER — LAB SERVICES (OUTPATIENT)
Dept: LAB | Age: 62
End: 2022-12-05

## 2022-12-05 ENCOUNTER — OFFICE VISIT (OUTPATIENT)
Dept: CARDIOLOGY | Age: 62
End: 2022-12-05

## 2022-12-05 VITALS
WEIGHT: 258 LBS | HEIGHT: 68 IN | OXYGEN SATURATION: 98 % | DIASTOLIC BLOOD PRESSURE: 78 MMHG | SYSTOLIC BLOOD PRESSURE: 102 MMHG | BODY MASS INDEX: 39.1 KG/M2 | HEART RATE: 89 BPM | TEMPERATURE: 95.8 F

## 2022-12-05 DIAGNOSIS — G47.33 OSA (OBSTRUCTIVE SLEEP APNEA): ICD-10-CM

## 2022-12-05 DIAGNOSIS — I10 ESSENTIAL HYPERTENSION: ICD-10-CM

## 2022-12-05 DIAGNOSIS — E66.09 CLASS 2 OBESITY DUE TO EXCESS CALORIES WITH BODY MASS INDEX (BMI) OF 39.0 TO 39.9 IN ADULT, UNSPECIFIED WHETHER SERIOUS COMORBIDITY PRESENT: ICD-10-CM

## 2022-12-05 DIAGNOSIS — I25.84 CORONARY ATHEROSCLEROSIS DUE TO CALCIFIED CORONARY LESION: Primary | ICD-10-CM

## 2022-12-05 DIAGNOSIS — E78.5 DYSLIPIDEMIA: ICD-10-CM

## 2022-12-05 DIAGNOSIS — C61 PROSTATE CANCER (CMD): ICD-10-CM

## 2022-12-05 DIAGNOSIS — F17.200 CURRENT SMOKER: ICD-10-CM

## 2022-12-05 DIAGNOSIS — I25.10 CORONARY ATHEROSCLEROSIS DUE TO CALCIFIED CORONARY LESION: Primary | ICD-10-CM

## 2022-12-05 DIAGNOSIS — Z01.812 PRE-PROCEDURE LAB EXAM: ICD-10-CM

## 2022-12-05 LAB
SARS-COV-2 RNA RESP QL NAA+PROBE: NOT DETECTED
SERVICE CMNT-IMP: NORMAL
SERVICE CMNT-IMP: NORMAL

## 2022-12-05 PROCEDURE — U0005 INFEC AGEN DETEC AMPLI PROBE: HCPCS | Performed by: INTERNAL MEDICINE

## 2022-12-05 PROCEDURE — 3078F DIAST BP <80 MM HG: CPT | Performed by: INTERNAL MEDICINE

## 2022-12-05 PROCEDURE — 3074F SYST BP LT 130 MM HG: CPT | Performed by: INTERNAL MEDICINE

## 2022-12-05 PROCEDURE — 99214 OFFICE O/P EST MOD 30 MIN: CPT | Performed by: INTERNAL MEDICINE

## 2022-12-05 PROCEDURE — U0003 INFECTIOUS AGENT DETECTION BY NUCLEIC ACID (DNA OR RNA); SEVERE ACUTE RESPIRATORY SYNDROME CORONAVIRUS 2 (SARS-COV-2) (CORONAVIRUS DISEASE [COVID-19]), AMPLIFIED PROBE TECHNIQUE, MAKING USE OF HIGH THROUGHPUT TECHNOLOGIES AS DESCRIBED BY CMS-2020-01-R: HCPCS | Performed by: INTERNAL MEDICINE

## 2022-12-05 ASSESSMENT — ENCOUNTER SYMPTOMS
NEUROLOGICAL NEGATIVE: 1
RESPIRATORY NEGATIVE: 1
ADENOPATHY: 1

## 2022-12-06 ENCOUNTER — APPOINTMENT (OUTPATIENT)
Dept: SLEEP MEDICINE | Age: 62
End: 2022-12-06
Attending: INTERNAL MEDICINE

## 2022-12-07 ENCOUNTER — APPOINTMENT (OUTPATIENT)
Dept: SLEEP MEDICINE | Age: 62
End: 2022-12-07
Attending: INTERNAL MEDICINE

## 2022-12-07 ENCOUNTER — TELEPHONE (OUTPATIENT)
Dept: FAMILY MEDICINE | Age: 62
End: 2022-12-07

## 2022-12-14 DIAGNOSIS — I10 ESSENTIAL HYPERTENSION: ICD-10-CM

## 2022-12-14 RX ORDER — AMLODIPINE BESYLATE 10 MG/1
TABLET ORAL
Qty: 30 TABLET | Refills: 3 | Status: SHIPPED | OUTPATIENT
Start: 2022-12-14 | End: 2023-04-05

## 2022-12-28 DIAGNOSIS — Z01.812 PRE-PROCEDURE LAB EXAM: Primary | ICD-10-CM

## 2023-01-01 ENCOUNTER — OFFICE VISIT (OUTPATIENT)
Dept: URGENT CARE | Facility: URGENT CARE | Age: 63
End: 2023-01-01
Payer: COMMERCIAL

## 2023-01-01 VITALS
HEART RATE: 94 BPM | OXYGEN SATURATION: 96 % | SYSTOLIC BLOOD PRESSURE: 141 MMHG | TEMPERATURE: 98.3 F | WEIGHT: 258.2 LBS | DIASTOLIC BLOOD PRESSURE: 92 MMHG

## 2023-01-01 VITALS
OXYGEN SATURATION: 97 % | TEMPERATURE: 97.9 F | DIASTOLIC BLOOD PRESSURE: 71 MMHG | RESPIRATION RATE: 22 BRPM | WEIGHT: 259 LBS | SYSTOLIC BLOOD PRESSURE: 114 MMHG | HEART RATE: 91 BPM

## 2023-01-01 VITALS
DIASTOLIC BLOOD PRESSURE: 89 MMHG | TEMPERATURE: 97.8 F | HEART RATE: 105 BPM | SYSTOLIC BLOOD PRESSURE: 133 MMHG | RESPIRATION RATE: 16 BRPM | WEIGHT: 260.8 LBS | OXYGEN SATURATION: 98 %

## 2023-01-01 DIAGNOSIS — J06.9 VIRAL UPPER RESPIRATORY TRACT INFECTION WITH COUGH: Primary | ICD-10-CM

## 2023-01-01 DIAGNOSIS — R42 DIZZINESS: ICD-10-CM

## 2023-01-01 DIAGNOSIS — R09.81 SINUS CONGESTION: ICD-10-CM

## 2023-01-01 DIAGNOSIS — J01.00 ACUTE NON-RECURRENT MAXILLARY SINUSITIS: Primary | ICD-10-CM

## 2023-01-01 DIAGNOSIS — R07.9 CHEST PAIN, UNSPECIFIED TYPE: Primary | ICD-10-CM

## 2023-01-01 PROCEDURE — 93000 ELECTROCARDIOGRAM COMPLETE: CPT

## 2023-01-01 PROCEDURE — 99213 OFFICE O/P EST LOW 20 MIN: CPT

## 2023-01-01 PROCEDURE — 99214 OFFICE O/P EST MOD 30 MIN: CPT | Mod: 25

## 2023-01-01 PROCEDURE — 99203 OFFICE O/P NEW LOW 30 MIN: CPT | Performed by: PHYSICIAN ASSISTANT

## 2023-01-01 RX ORDER — DAPAGLIFLOZIN 10 MG/1
1 TABLET, FILM COATED ORAL
COMMUNITY
Start: 2023-06-09

## 2023-01-01 RX ORDER — EPLERENONE 25 MG/1
1 TABLET, FILM COATED ORAL
COMMUNITY
Start: 2023-06-07

## 2023-01-01 RX ORDER — HYDROCHLOROTHIAZIDE 50 MG/1
1 TABLET ORAL
COMMUNITY
Start: 2023-06-07

## 2023-01-01 RX ORDER — METOPROLOL SUCCINATE 50 MG/1
TABLET, EXTENDED RELEASE ORAL
COMMUNITY
Start: 2023-06-07

## 2023-01-01 RX ORDER — ASPIRIN 81 MG/1
81 TABLET, CHEWABLE ORAL
COMMUNITY
Start: 2022-02-22

## 2023-01-01 RX ORDER — DOXYCYCLINE HYCLATE 100 MG
100 TABLET ORAL 2 TIMES DAILY
Qty: 14 TABLET | Refills: 0 | Status: SHIPPED | OUTPATIENT
Start: 2023-01-01 | End: 2023-01-01

## 2023-01-01 RX ORDER — MOLNUPIRAVIR 200 MG/1
CAPSULE ORAL
COMMUNITY
Start: 2023-04-17

## 2023-01-01 ASSESSMENT — ENCOUNTER SYMPTOMS
VOMITING: 1
RHINORRHEA: 1
FEVER: 0
HEMATURIA: 0
ABDOMINAL PAIN: 0
WHEEZING: 0
NEUROLOGICAL NEGATIVE: 1
CHEST TIGHTNESS: 0
MYALGIAS: 0
MUSCULOSKELETAL NEGATIVE: 1
HEADACHES: 0
SHORTNESS OF BREATH: 0
CONSTITUTIONAL NEGATIVE: 1
PALPITATIONS: 0
NAUSEA: 0
SINUS PAIN: 0
CHILLS: 0
DYSURIA: 0
COUGH: 1
CARDIOVASCULAR NEGATIVE: 1
ALLERGIC/IMMUNOLOGIC NEGATIVE: 1
SORE THROAT: 0
SINUS PRESSURE: 0
FREQUENCY: 0
DIARRHEA: 0

## 2023-01-01 ASSESSMENT — PAIN SCALES - GENERAL: PAINLEVEL: SEVERE PAIN (6)

## 2023-01-04 ENCOUNTER — APPOINTMENT (OUTPATIENT)
Dept: FAMILY MEDICINE | Age: 63
End: 2023-01-04

## 2023-01-07 ENCOUNTER — OFFICE VISIT (OUTPATIENT)
Dept: URGENT CARE | Facility: URGENT CARE | Age: 63
End: 2023-01-07
Payer: COMMERCIAL

## 2023-01-07 VITALS
SYSTOLIC BLOOD PRESSURE: 131 MMHG | OXYGEN SATURATION: 98 % | TEMPERATURE: 98 F | HEART RATE: 100 BPM | BODY MASS INDEX: 38.78 KG/M2 | WEIGHT: 258.8 LBS | DIASTOLIC BLOOD PRESSURE: 79 MMHG

## 2023-01-07 DIAGNOSIS — I10 HYPERTENSION, UNSPECIFIED TYPE: ICD-10-CM

## 2023-01-07 DIAGNOSIS — R06.2 WHEEZING: ICD-10-CM

## 2023-01-07 DIAGNOSIS — R05.1 ACUTE COUGH: ICD-10-CM

## 2023-01-07 DIAGNOSIS — J01.90 ACUTE SINUSITIS WITH COEXISTING CONDITION REQUIRING PROPHYLACTIC TREATMENT: ICD-10-CM

## 2023-01-07 DIAGNOSIS — J20.9 ACUTE BRONCHITIS WITH COEXISTING CONDITION REQUIRING PROPHYLACTIC TREATMENT: ICD-10-CM

## 2023-01-07 DIAGNOSIS — H10.32 ACUTE BACTERIAL CONJUNCTIVITIS OF LEFT EYE: Primary | ICD-10-CM

## 2023-01-07 DIAGNOSIS — F17.200 SMOKER: ICD-10-CM

## 2023-01-07 PROCEDURE — 99204 OFFICE O/P NEW MOD 45 MIN: CPT | Performed by: PHYSICIAN ASSISTANT

## 2023-01-07 RX ORDER — PREDNISONE 20 MG/1
TABLET ORAL
Qty: 10 TABLET | Refills: 0 | Status: SHIPPED | OUTPATIENT
Start: 2023-01-07

## 2023-01-07 RX ORDER — DOXYCYCLINE 100 MG/1
100 CAPSULE ORAL 2 TIMES DAILY
Qty: 20 CAPSULE | Refills: 0 | Status: SHIPPED | OUTPATIENT
Start: 2023-01-07 | End: 2023-01-17

## 2023-01-07 RX ORDER — POLYMYXIN B SULFATE AND TRIMETHOPRIM 1; 10000 MG/ML; [USP'U]/ML
1 SOLUTION OPHTHALMIC EVERY 6 HOURS
Qty: 2 ML | Refills: 0 | Status: SHIPPED | OUTPATIENT
Start: 2023-01-07 | End: 2023-01-14

## 2023-01-07 NOTE — PROGRESS NOTES
Chief Complaint   Patient presents with     Cold Symptoms     Sx constant running nose, pain behind ears (stabbing type pain), coughing - painful, thick mucus, congestion. Exposed to Cold virus on New Years Day. Sx started Wednesday, worsening. Trouble sleeping. Sore throat.   Pt has tried: TheraFlu, nighttime and daytime. Vix vapor rub, Vitamin C; stated none of these worked.      pink eye     Sx yesterday morning. Redness in L eye.              ASSESSMENT:       ICD-10-CM    1. Acute bacterial conjunctivitis of left eye  H10.32 trimethoprim-polymyxin b (POLYTRIM) 43750-9.1 UNIT/ML-% ophthalmic solution     predniSONE (DELTASONE) 20 MG tablet     doxycycline hyclate (VIBRAMYCIN) 100 MG capsule      2. Acute sinusitis with coexisting condition requiring prophylactic treatment  J01.90 trimethoprim-polymyxin b (POLYTRIM) 04531-2.1 UNIT/ML-% ophthalmic solution     predniSONE (DELTASONE) 20 MG tablet     doxycycline hyclate (VIBRAMYCIN) 100 MG capsule      3. Acute bronchitis with coexisting condition requiring prophylactic treatment  J20.9 trimethoprim-polymyxin b (POLYTRIM) 61240-8.1 UNIT/ML-% ophthalmic solution     predniSONE (DELTASONE) 20 MG tablet     doxycycline hyclate (VIBRAMYCIN) 100 MG capsule      4. Acute cough  R05.1 trimethoprim-polymyxin b (POLYTRIM) 94190-4.1 UNIT/ML-% ophthalmic solution     predniSONE (DELTASONE) 20 MG tablet     doxycycline hyclate (VIBRAMYCIN) 100 MG capsule      5. Wheezing  R06.2 trimethoprim-polymyxin b (POLYTRIM) 11105-6.1 UNIT/ML-% ophthalmic solution     predniSONE (DELTASONE) 20 MG tablet     doxycycline hyclate (VIBRAMYCIN) 100 MG capsule      6. Smoker  F17.200 trimethoprim-polymyxin b (POLYTRIM) 88065-5.1 UNIT/ML-% ophthalmic solution     predniSONE (DELTASONE) 20 MG tablet     doxycycline hyclate (VIBRAMYCIN) 100 MG capsule      7. Hypertension, unspecified type  I10 trimethoprim-polymyxin b (POLYTRIM) 54007-7.1 UNIT/ML-% ophthalmic solution     predniSONE (DELTASONE)  20 MG tablet     doxycycline hyclate (VIBRAMYCIN) 100 MG capsule            PLAN: Acute bronchitis/sinusitis and smoker with hypertension.  Also with wheezing.  Doxycycline antibiotic.  Restart Ventolin inhaler.  Oral prednisone in addition for wheezing.  Antibiotic eyedrops for pinkeye.  Warm packs as needed.  Recheck IN 3 days if not improved.  See primary care provider to discuss smoking cessation options.  I have discussed clinical findings with patient.  Side effects of medications discussed.  Symptomatic care is discussed.  I have discussed the possibility of  worsening symptoms and indication to RTC or go to the ER if they occur.  All questions are answered, patient indicates understanding of these issues and is in agreement with plan.   Patient care instructions are discussed/given at the end of visit.   Lots of rest and fluids.      Eleonora Wu PA-C      SUBJECTIVE:  62-year-old smoker presents for acute onset of cough, nasal congestion, and chest congestion for at least 4 days now.  Hears wheezing.  Initially temp up to 102.  Had some leftover Tessalon Perles from viral infection in November but states they are not helping.  Also has a Ventolin inhaler for some wheezing from infection in November but has not tried this.  No vomiting or diarrhea.  Sinus pressure.  Postnasal drip.  Sick grandchild over New Year's he was exposed to.  Today woke up with red matted eye.  No decreased vision.  No history of glaucoma or cataracts.  History of  hypertension, coronary artery disease, prostate cancer.  Denies diabetes.    Allergies   Allergen Reactions     Bupropion Hives     PN: LW Reaction: HIVES     Penicillins Nausea and Vomiting     PN: LW Reaction: Vomiting       No past medical history on file.    amLODIPine (NORVASC) 10 MG tablet, Take 10 mg by mouth daily  atorvastatin (LIPITOR) 80 MG tablet, TAKE 1 TABLET BY MOUTH EVERYDAY AT BEDTIME  benzonatate (TESSALON) 200 MG capsule, Take 1 capsule (200 mg) by  mouth 3 times daily as needed for cough  losartan (COZAAR) 50 MG tablet, Take 50 mg by mouth daily  QUEtiapine (SEROQUEL) 25 MG tablet, TAKE 1 TABLET BY MOUTH EVERYDAY AT BEDTIME  amLODIPine (NORVASC) 10 MG tablet, Take 1 tablet (10 mg) by mouth daily  atenolol (TENORMIN) 50 MG tablet, Take 50 mg by mouth daily. (Patient not taking: Reported on 1/7/2023)  citalopram (CELEXA) 40 MG tablet, Take 40 mg by mouth daily. (Patient not taking: Reported on 1/7/2023)  HYDROcodone-acetaminophen 5-325 MG per tablet, 1-2 po every six hours as needed pain (Patient not taking: Reported on 12/20/2019)  predniSONE (DELTASONE) 20 MG tablet, Take 3 tablets by mouth daily for 5 days.  tadalafil (CIALIS) 20 MG tablet, Take 1 tablet by mouth daily as needed. (Patient not taking: Reported on 1/7/2023)  tamsulosin (FLOMAX) 0.4 MG capsule, TAKE 1 CAPSULE BY MOUTH EVERY DAY (Patient not taking: Reported on 1/7/2023)  triamcinolone (KENALOG) 0.5 % cream, Apply sparingly to affected area three times daily x 10-14 days.  Avoid use for longer than 2 weeks consecutively. (Patient not taking: Reported on 12/20/2019)  triamterene-hydrochlorothiazide (MAXZIDE-25) 37.5-25 MG per tablet, Take 1 tablet by mouth daily.  zolpidem ER (AMBIEN CR) 12.5 MG CR tablet, Take  by mouth nightly as needed. (Patient not taking: Reported on 1/7/2023)    No current facility-administered medications on file prior to visit.      Social History     Tobacco Use     Smoking status: Every Day     Packs/day: 0.50     Types: Cigarettes     Smokeless tobacco: Never   Substance Use Topics     Alcohol use: Yes     Comment: daily       ROS:  CONSTITUTIONAL: Negative for fatigue or fever.  EYES: As above   ENT: As above.  RESP: As above.  CV: Negative for chest pains.  GI: Negative for vomiting.  MUSCULOSKELETAL:  Negative for significant muscle or joint pains.  NEUROLOGIC: Negative for headaches.  SKIN: Negative for rash.  PSYCH: Normal mentation for age.    OBJECTIVE:  BP  131/79 (BP Location: Left arm, Cuff Size: Adult Large)   Pulse 100   Temp 98  F (36.7  C) (Tympanic)   Wt 117.4 kg (258 lb 12.8 oz)   SpO2 98%   BMI 38.78 kg/m    GENERAL APPEARANCE: Healthy, alert and no distress.  EYES:Conjunctiva/sclera with injection left eye.  Some crusting on upper eyelashes.  Pupils equal reactive to light and accommodation.  EOMs intact without pain.  Funduscopic exam grossly benign.  :  Ears: No cerumen.   Ear canals w/o erythema.  TM's intact w/o erythema.    SINUSES: Mild bilateral maxillary sinus tenderness.  THROAT: No erythema w/o tonsillar enlargement . No exudates.  NECK: Supple, nontender, no lymphadenopathy.  RESP: Lungs are with wheezing throughout.  Some decreased air movement.    CV: Regular rate and rhythm, normal S1 S2, no murmur noted.  NEURO: Awake, alert    SKIN: No rashes        Eleonora Wu PA-C

## 2023-02-08 ENCOUNTER — LAB SERVICES (OUTPATIENT)
Dept: LAB | Age: 63
End: 2023-02-08

## 2023-02-08 DIAGNOSIS — Z01.812 PRE-PROCEDURE LAB EXAM: ICD-10-CM

## 2023-02-08 LAB
SARS-COV-2 RNA RESP QL NAA+PROBE: NOT DETECTED
SERVICE CMNT-IMP: NORMAL
SERVICE CMNT-IMP: NORMAL

## 2023-02-08 PROCEDURE — U0005 INFEC AGEN DETEC AMPLI PROBE: HCPCS | Performed by: INTERNAL MEDICINE

## 2023-02-08 PROCEDURE — U0003 INFECTIOUS AGENT DETECTION BY NUCLEIC ACID (DNA OR RNA); SEVERE ACUTE RESPIRATORY SYNDROME CORONAVIRUS 2 (SARS-COV-2) (CORONAVIRUS DISEASE [COVID-19]), AMPLIFIED PROBE TECHNIQUE, MAKING USE OF HIGH THROUGHPUT TECHNOLOGIES AS DESCRIBED BY CMS-2020-01-R: HCPCS | Performed by: INTERNAL MEDICINE

## 2023-02-10 ENCOUNTER — OFFICE VISIT (OUTPATIENT)
Dept: SLEEP MEDICINE | Age: 63
End: 2023-02-10
Attending: INTERNAL MEDICINE

## 2023-02-10 ENCOUNTER — OFFICE VISIT (OUTPATIENT)
Dept: FAMILY MEDICINE | Age: 63
End: 2023-02-10

## 2023-02-10 VITALS
TEMPERATURE: 98.3 F | DIASTOLIC BLOOD PRESSURE: 89 MMHG | WEIGHT: 259 LBS | BODY MASS INDEX: 39.38 KG/M2 | SYSTOLIC BLOOD PRESSURE: 131 MMHG

## 2023-02-10 DIAGNOSIS — R42 DIZZY: ICD-10-CM

## 2023-02-10 DIAGNOSIS — G47.33 OSA (OBSTRUCTIVE SLEEP APNEA): ICD-10-CM

## 2023-02-10 DIAGNOSIS — J32.9 CHRONIC SINUSITIS, UNSPECIFIED LOCATION: Primary | ICD-10-CM

## 2023-02-10 DIAGNOSIS — G89.29 CHRONIC PAIN OF LEFT KNEE: ICD-10-CM

## 2023-02-10 DIAGNOSIS — M25.562 CHRONIC PAIN OF LEFT KNEE: ICD-10-CM

## 2023-02-10 LAB — REPORT TEXT: NORMAL

## 2023-02-10 PROCEDURE — 3079F DIAST BP 80-89 MM HG: CPT | Performed by: STUDENT IN AN ORGANIZED HEALTH CARE EDUCATION/TRAINING PROGRAM

## 2023-02-10 PROCEDURE — 99214 OFFICE O/P EST MOD 30 MIN: CPT | Performed by: STUDENT IN AN ORGANIZED HEALTH CARE EDUCATION/TRAINING PROGRAM

## 2023-02-10 PROCEDURE — 95811 POLYSOM 6/>YRS CPAP 4/> PARM: CPT | Performed by: SPECIALIST

## 2023-02-10 PROCEDURE — 3075F SYST BP GE 130 - 139MM HG: CPT | Performed by: STUDENT IN AN ORGANIZED HEALTH CARE EDUCATION/TRAINING PROGRAM

## 2023-02-10 RX ORDER — IPRATROPIUM BROMIDE 42 UG/1
SPRAY, METERED NASAL
Qty: 15 ML | Refills: 0 | Status: SHIPPED | OUTPATIENT
Start: 2023-02-10 | End: 2023-04-27 | Stop reason: ALTCHOICE

## 2023-02-10 ASSESSMENT — PATIENT HEALTH QUESTIONNAIRE - PHQ9
CLINICAL INTERPRETATION OF PHQ2 SCORE: NO FURTHER SCREENING NEEDED
1. LITTLE INTEREST OR PLEASURE IN DOING THINGS: NOT AT ALL
SUM OF ALL RESPONSES TO PHQ9 QUESTIONS 1 AND 2: 0
2. FEELING DOWN, DEPRESSED OR HOPELESS: NOT AT ALL
SUM OF ALL RESPONSES TO PHQ9 QUESTIONS 1 AND 2: 0

## 2023-02-11 ASSESSMENT — ENCOUNTER SYMPTOMS
EYE DISCHARGE: 0
WHEEZING: 0
GASTROINTESTINAL NEGATIVE: 1
FATIGUE: 1
TROUBLE SWALLOWING: 0
COUGH: 0
CHILLS: 0
SINUS PAIN: 0
SHORTNESS OF BREATH: 0
FEVER: 0
EYE PAIN: 0
RHINORRHEA: 1
EYE ITCHING: 0
ENDOCRINE NEGATIVE: 1

## 2023-02-13 ENCOUNTER — TELEPHONE (OUTPATIENT)
Dept: SLEEP MEDICINE | Age: 63
End: 2023-02-13

## 2023-02-15 ENCOUNTER — APPOINTMENT (OUTPATIENT)
Dept: LAB | Age: 63
End: 2023-02-15

## 2023-02-17 ENCOUNTER — APPOINTMENT (OUTPATIENT)
Dept: SLEEP MEDICINE | Age: 63
End: 2023-02-17
Attending: INTERNAL MEDICINE

## 2023-02-18 DIAGNOSIS — J32.9 CHRONIC SINUSITIS, UNSPECIFIED LOCATION: ICD-10-CM

## 2023-02-24 ENCOUNTER — APPOINTMENT (OUTPATIENT)
Dept: FAMILY MEDICINE | Age: 63
End: 2023-02-24

## 2023-03-01 DIAGNOSIS — J31.0 CHRONIC RHINITIS: ICD-10-CM

## 2023-03-01 DIAGNOSIS — F33.2 SEVERE EPISODE OF RECURRENT MAJOR DEPRESSIVE DISORDER, WITHOUT PSYCHOTIC FEATURES (CMD): ICD-10-CM

## 2023-03-01 DIAGNOSIS — J32.9 CHRONIC SINUSITIS, UNSPECIFIED LOCATION: ICD-10-CM

## 2023-03-01 DIAGNOSIS — J30.2 SEASONAL ALLERGIC RHINITIS, UNSPECIFIED TRIGGER: ICD-10-CM

## 2023-03-03 RX ORDER — MONTELUKAST SODIUM 10 MG/1
10 TABLET ORAL
Qty: 30 TABLET | Refills: 3 | Status: SHIPPED | OUTPATIENT
Start: 2023-03-03 | End: 2023-06-29

## 2023-03-03 RX ORDER — QUETIAPINE FUMARATE 25 MG/1
TABLET, FILM COATED ORAL
Qty: 30 TABLET | Refills: 3 | Status: SHIPPED | OUTPATIENT
Start: 2023-03-03 | End: 2023-06-29

## 2023-03-03 RX ORDER — IPRATROPIUM BROMIDE 42 UG/1
SPRAY, METERED NASAL
Qty: 15 EACH | OUTPATIENT
Start: 2023-03-03

## 2023-04-03 ENCOUNTER — CLINICAL ABSTRACT (OUTPATIENT)
Dept: FAMILY MEDICINE | Age: 63
End: 2023-04-03

## 2023-04-03 RX ORDER — VENLAFAXINE HYDROCHLORIDE 75 MG/1
75 CAPSULE, EXTENDED RELEASE ORAL DAILY
Qty: 30 CAPSULE | Refills: 0 | Status: SHIPPED | OUTPATIENT
Start: 2023-04-03 | End: 2023-04-03 | Stop reason: SDUPTHER

## 2023-04-03 RX ORDER — VENLAFAXINE HYDROCHLORIDE 75 MG/1
CAPSULE, EXTENDED RELEASE ORAL
Qty: 30 CAPSULE | Refills: 2 | OUTPATIENT
Start: 2023-04-03

## 2023-04-03 RX ORDER — VENLAFAXINE HYDROCHLORIDE 75 MG/1
75 CAPSULE, EXTENDED RELEASE ORAL DAILY
Qty: 30 CAPSULE | Refills: 0 | Status: SHIPPED | OUTPATIENT
Start: 2023-04-03 | End: 2023-04-17 | Stop reason: SDUPTHER

## 2023-04-05 DIAGNOSIS — I10 ESSENTIAL HYPERTENSION: ICD-10-CM

## 2023-04-05 RX ORDER — AMLODIPINE BESYLATE 10 MG/1
TABLET ORAL
Qty: 30 TABLET | Refills: 3 | Status: SHIPPED | OUTPATIENT
Start: 2023-04-05 | End: 2023-06-07 | Stop reason: SDUPTHER

## 2023-04-07 DIAGNOSIS — I10 ESSENTIAL HYPERTENSION: ICD-10-CM

## 2023-04-07 RX ORDER — LOSARTAN POTASSIUM 100 MG/1
TABLET ORAL
Qty: 90 TABLET | Refills: 0 | Status: SHIPPED | OUTPATIENT
Start: 2023-04-07 | End: 2023-06-07 | Stop reason: SDUPTHER

## 2023-04-16 ENCOUNTER — HEALTH MAINTENANCE LETTER (OUTPATIENT)
Age: 63
End: 2023-04-16

## 2023-04-17 ENCOUNTER — BEHAVIORAL HEALTH (OUTPATIENT)
Dept: BEHAVIORAL HEALTH | Age: 63
End: 2023-04-17

## 2023-04-17 ENCOUNTER — V-VISIT (OUTPATIENT)
Dept: FAMILY MEDICINE | Age: 63
End: 2023-04-17

## 2023-04-17 DIAGNOSIS — F41.9 ANXIETY DISORDER, UNSPECIFIED TYPE: Primary | ICD-10-CM

## 2023-04-17 DIAGNOSIS — F33.42 RECURRENT MAJOR DEPRESSIVE DISORDER, IN FULL REMISSION (CMD): ICD-10-CM

## 2023-04-17 DIAGNOSIS — U07.1 COVID-19 VIRUS INFECTION: Primary | ICD-10-CM

## 2023-04-17 DIAGNOSIS — F17.200 CURRENT SMOKER: ICD-10-CM

## 2023-04-17 DIAGNOSIS — R73.03 PREDIABETES: ICD-10-CM

## 2023-04-17 DIAGNOSIS — I10 ESSENTIAL HYPERTENSION: ICD-10-CM

## 2023-04-17 DIAGNOSIS — F10.11 ALCOHOL USE DISORDER, MILD, IN EARLY REMISSION: ICD-10-CM

## 2023-04-17 DIAGNOSIS — G47.00 INSOMNIA, UNSPECIFIED TYPE: ICD-10-CM

## 2023-04-17 PROCEDURE — 99214 OFFICE O/P EST MOD 30 MIN: CPT | Performed by: NURSE PRACTITIONER

## 2023-04-17 PROCEDURE — 99214 OFFICE O/P EST MOD 30 MIN: CPT | Performed by: PSYCHIATRY & NEUROLOGY

## 2023-04-17 RX ORDER — VENLAFAXINE HYDROCHLORIDE 75 MG/1
75 CAPSULE, EXTENDED RELEASE ORAL DAILY
Qty: 90 CAPSULE | Refills: 1 | Status: SHIPPED | OUTPATIENT
Start: 2023-04-17

## 2023-04-24 ENCOUNTER — OFFICE VISIT (OUTPATIENT)
Dept: FAMILY MEDICINE | Age: 63
End: 2023-04-24

## 2023-04-24 ENCOUNTER — LAB SERVICES (OUTPATIENT)
Dept: LAB | Age: 63
End: 2023-04-24

## 2023-04-24 VITALS
HEIGHT: 68 IN | SYSTOLIC BLOOD PRESSURE: 114 MMHG | BODY MASS INDEX: 38.49 KG/M2 | WEIGHT: 254 LBS | TEMPERATURE: 97.8 F | HEART RATE: 82 BPM | DIASTOLIC BLOOD PRESSURE: 75 MMHG

## 2023-04-24 DIAGNOSIS — Z23 ENCOUNTER FOR ADMINISTRATION OF VACCINE: ICD-10-CM

## 2023-04-24 DIAGNOSIS — Z00.00 ANNUAL PHYSICAL EXAM: Primary | ICD-10-CM

## 2023-04-24 DIAGNOSIS — G47.33 OSA (OBSTRUCTIVE SLEEP APNEA): ICD-10-CM

## 2023-04-24 DIAGNOSIS — F33.2 SEVERE EPISODE OF RECURRENT MAJOR DEPRESSIVE DISORDER, WITHOUT PSYCHOTIC FEATURES (CMD): ICD-10-CM

## 2023-04-24 DIAGNOSIS — E78.5 DYSLIPIDEMIA: ICD-10-CM

## 2023-04-24 DIAGNOSIS — R73.03 PREDIABETES: ICD-10-CM

## 2023-04-24 DIAGNOSIS — Z72.0 TOBACCO USE: ICD-10-CM

## 2023-04-24 DIAGNOSIS — Z12.11 ENCOUNTER FOR SCREENING COLONOSCOPY: ICD-10-CM

## 2023-04-24 DIAGNOSIS — E66.9 OBESITY (BMI 30-39.9): ICD-10-CM

## 2023-04-24 DIAGNOSIS — F10.21 ALCOHOL DEPENDENCE IN REMISSION (CMD): ICD-10-CM

## 2023-04-24 DIAGNOSIS — C61 PROSTATE CANCER (CMD): ICD-10-CM

## 2023-04-24 DIAGNOSIS — I10 ESSENTIAL HYPERTENSION: ICD-10-CM

## 2023-04-24 DIAGNOSIS — R07.9 CHEST PAIN, UNSPECIFIED TYPE: ICD-10-CM

## 2023-04-24 DIAGNOSIS — F17.200 CURRENT SMOKER: ICD-10-CM

## 2023-04-24 LAB — HBA1C MFR BLD: 6.3 % (ref 4.5–5.6)

## 2023-04-24 PROCEDURE — 99396 PREV VISIT EST AGE 40-64: CPT | Performed by: STUDENT IN AN ORGANIZED HEALTH CARE EDUCATION/TRAINING PROGRAM

## 2023-04-24 PROCEDURE — 96127 BRIEF EMOTIONAL/BEHAV ASSMT: CPT | Performed by: STUDENT IN AN ORGANIZED HEALTH CARE EDUCATION/TRAINING PROGRAM

## 2023-04-24 PROCEDURE — 3074F SYST BP LT 130 MM HG: CPT | Performed by: STUDENT IN AN ORGANIZED HEALTH CARE EDUCATION/TRAINING PROGRAM

## 2023-04-24 PROCEDURE — 3078F DIAST BP <80 MM HG: CPT | Performed by: STUDENT IN AN ORGANIZED HEALTH CARE EDUCATION/TRAINING PROGRAM

## 2023-04-24 PROCEDURE — 90471 IMMUNIZATION ADMIN: CPT | Performed by: FAMILY MEDICINE

## 2023-04-24 PROCEDURE — 90472 IMMUNIZATION ADMIN EACH ADD: CPT | Performed by: STUDENT IN AN ORGANIZED HEALTH CARE EDUCATION/TRAINING PROGRAM

## 2023-04-24 PROCEDURE — 90677 PCV20 VACCINE IM: CPT | Performed by: FAMILY MEDICINE

## 2023-04-24 PROCEDURE — 80061 LIPID PANEL: CPT | Performed by: INTERNAL MEDICINE

## 2023-04-24 PROCEDURE — 83036 HEMOGLOBIN GLYCOSYLATED A1C: CPT | Performed by: INTERNAL MEDICINE

## 2023-04-24 PROCEDURE — 93000 ELECTROCARDIOGRAM COMPLETE: CPT | Performed by: STUDENT IN AN ORGANIZED HEALTH CARE EDUCATION/TRAINING PROGRAM

## 2023-04-24 PROCEDURE — 99214 OFFICE O/P EST MOD 30 MIN: CPT | Performed by: STUDENT IN AN ORGANIZED HEALTH CARE EDUCATION/TRAINING PROGRAM

## 2023-04-24 PROCEDURE — 36415 COLL VENOUS BLD VENIPUNCTURE: CPT | Performed by: FAMILY MEDICINE

## 2023-04-24 PROCEDURE — 80053 COMPREHEN METABOLIC PANEL: CPT | Performed by: INTERNAL MEDICINE

## 2023-04-24 PROCEDURE — 90750 HZV VACC RECOMBINANT IM: CPT | Performed by: STUDENT IN AN ORGANIZED HEALTH CARE EDUCATION/TRAINING PROGRAM

## 2023-04-24 RX ORDER — TRIAMCINOLONE ACETONIDE 1 MG/G
1 OINTMENT TOPICAL 2 TIMES DAILY
Qty: 30 G | Refills: 0 | Status: SHIPPED | OUTPATIENT
Start: 2023-04-24

## 2023-04-24 ASSESSMENT — ANXIETY QUESTIONNAIRES
1. FEELING NERVOUS, ANXIOUS, OR ON EDGE: NOT AT ALL
7. FEELING AFRAID AS IF SOMETHING AWFUL MIGHT HAPPEN: NOT AT ALL
4. TROUBLE RELAXING: 0
IF YOU CHECKED OFF ANY PROBLEMS ON THIS QUESTIONNAIRE, HOW DIFFICULT HAVE THESE PROBLEMS MADE IT FOR YOU TO DO YOUR WORK, TAKE CARE OF THINGS AT HOME, OR GET ALONG WITH OTHER PEOPLE: NOT DIFFICULT AT ALL
6. BECOMING EASILY ANNOYED OR IRRITABLE: 0
3. WORRYING TOO MUCH ABOUT DIFFERENT THINGS: 0
5. BEING SO RESTLESS THAT IT IS HARD TO SIT STILL: NOT AT ALL
1. FEELING NERVOUS, ANXIOUS, OR ON EDGE: 0
7. FEELING AFRAID AS IF SOMETHING AWFUL MIGHT HAPPEN: 0
6. BECOMING EASILY ANNOYED OR IRRITABLE: NOT AT ALL
2. NOT BEING ABLE TO STOP OR CONTROL WORRYING: NOT AT ALL
2. NOT BEING ABLE TO STOP OR CONTROL WORRYING: 0
GAD7 TOTAL SCORE: 0
4. TROUBLE RELAXING: NOT AT ALL
3. WORRYING TOO MUCH ABOUT DIFFERENT THINGS: NOT AT ALL
5. BEING SO RESTLESS THAT IT IS HARD TO SIT STILL: 0

## 2023-04-24 ASSESSMENT — PATIENT HEALTH QUESTIONNAIRE - PHQ9
9. THOUGHTS THAT YOU WOULD BE BETTER OFF DEAD, OR OF HURTING YOURSELF: NOT AT ALL
3. TROUBLE FALLING OR STAYING ASLEEP OR SLEEPING TOO MUCH: SEVERAL DAYS
SUM OF ALL RESPONSES TO PHQ9 QUESTIONS 1 AND 2: 1
1. LITTLE INTEREST OR PLEASURE IN DOING THINGS: NOT AT ALL
SUM OF ALL RESPONSES TO PHQ QUESTIONS 1-9: 6
CLINICAL INTERPRETATION OF PHQ2 SCORE: NO FURTHER SCREENING NEEDED
6. FEELING BAD ABOUT YOURSELF - OR THAT YOU ARE A FAILURE OR HAVE LET YOURSELF OR YOUR FAMILY DOWN: SEVERAL DAYS
8. MOVING OR SPEAKING SO SLOWLY THAT OTHER PEOPLE COULD HAVE NOTICED. OR THE OPPOSITE, BEING SO FIGETY OR RESTLESS THAT YOU HAVE BEEN MOVING AROUND A LOT MORE THAN USUAL: NOT AT ALL
2. FEELING DOWN, DEPRESSED OR HOPELESS: SEVERAL DAYS
10. IF YOU CHECKED OFF ANY PROBLEMS, HOW DIFFICULT HAVE THESE PROBLEMS MADE IT FOR YOU TO DO YOUR WORK, TAKE CARE OF THINGS AT HOME, OR GET ALONG WITH OTHER PEOPLE: NOT DIFFICULT AT ALL
7. TROUBLE CONCENTRATING ON THINGS, SUCH AS READING THE NEWSPAPER OR WATCHING TELEVISION: SEVERAL DAYS
4. FEELING TIRED OR HAVING LITTLE ENERGY: MORE THAN HALF THE DAYS
CLINICAL INTERPRETATION OF PHQ9 SCORE: MILD DEPRESSION
5. POOR APPETITE, WEIGHT LOSS, OR OVEREATING: NOT AT ALL
SUM OF ALL RESPONSES TO PHQ9 QUESTIONS 1 AND 2: 1

## 2023-04-25 LAB
ALBUMIN SERPL-MCNC: 3.9 G/DL (ref 3.6–5.1)
ALBUMIN/GLOB SERPL: 0.8 {RATIO} (ref 1–2.4)
ALP SERPL-CCNC: 79 UNITS/L (ref 45–117)
ALT SERPL-CCNC: 72 UNITS/L
ANION GAP SERPL CALC-SCNC: 7 MMOL/L (ref 7–19)
AST SERPL-CCNC: 43 UNITS/L
BILIRUB SERPL-MCNC: 0.3 MG/DL (ref 0.2–1)
BUN SERPL-MCNC: 17 MG/DL (ref 6–20)
BUN/CREAT SERPL: 17 (ref 7–25)
CALCIUM SERPL-MCNC: 9.4 MG/DL (ref 8.4–10.2)
CHLORIDE SERPL-SCNC: 103 MMOL/L (ref 97–110)
CHOLEST SERPL-MCNC: 158 MG/DL
CHOLEST/HDLC SERPL: 4.4 {RATIO}
CO2 SERPL-SCNC: 28 MMOL/L (ref 21–32)
CREAT SERPL-MCNC: 0.98 MG/DL (ref 0.67–1.17)
FASTING DURATION TIME PATIENT: ABNORMAL H
FASTING DURATION TIME PATIENT: ABNORMAL H
GFR SERPLBLD BASED ON 1.73 SQ M-ARVRAT: 87 ML/MIN
GLOBULIN SER-MCNC: 5 G/DL (ref 2–4)
GLUCOSE SERPL-MCNC: 109 MG/DL (ref 70–99)
HDLC SERPL-MCNC: 36 MG/DL
LDLC SERPL CALC-MCNC: 88 MG/DL
NONHDLC SERPL-MCNC: 122 MG/DL
POTASSIUM SERPL-SCNC: 4.4 MMOL/L (ref 3.4–5.1)
PROT SERPL-MCNC: 8.9 G/DL (ref 6.4–8.2)
SODIUM SERPL-SCNC: 134 MMOL/L (ref 135–145)
TRIGL SERPL-MCNC: 171 MG/DL

## 2023-04-28 PROBLEM — F10.21 ALCOHOL DEPENDENCE IN REMISSION (CMD): Status: ACTIVE | Noted: 2017-12-07

## 2023-05-10 RX ORDER — DAPAGLIFLOZIN 10 MG/1
TABLET, FILM COATED ORAL
Qty: 90 TABLET | Refills: 0 | Status: SHIPPED | OUTPATIENT
Start: 2023-05-10 | End: 2023-06-07 | Stop reason: SDUPTHER

## 2023-05-16 ENCOUNTER — TELEPHONE (OUTPATIENT)
Dept: CARDIOLOGY | Age: 63
End: 2023-05-16

## 2023-05-22 DIAGNOSIS — I10 ESSENTIAL HYPERTENSION: ICD-10-CM

## 2023-05-23 RX ORDER — ATORVASTATIN CALCIUM 80 MG/1
TABLET, FILM COATED ORAL
Qty: 90 TABLET | Refills: 3 | Status: SHIPPED | OUTPATIENT
Start: 2023-05-23 | End: 2023-06-07 | Stop reason: SDUPTHER

## 2023-06-07 ENCOUNTER — OFFICE VISIT (OUTPATIENT)
Dept: CARDIOLOGY | Age: 63
End: 2023-06-07

## 2023-06-07 VITALS
OXYGEN SATURATION: 100 % | DIASTOLIC BLOOD PRESSURE: 80 MMHG | SYSTOLIC BLOOD PRESSURE: 132 MMHG | WEIGHT: 260 LBS | HEART RATE: 96 BPM | TEMPERATURE: 95.4 F | HEIGHT: 68 IN | BODY MASS INDEX: 39.4 KG/M2

## 2023-06-07 DIAGNOSIS — E66.09 CLASS 2 OBESITY DUE TO EXCESS CALORIES WITH BODY MASS INDEX (BMI) OF 39.0 TO 39.9 IN ADULT, UNSPECIFIED WHETHER SERIOUS COMORBIDITY PRESENT: Primary | ICD-10-CM

## 2023-06-07 DIAGNOSIS — G47.33 OSA (OBSTRUCTIVE SLEEP APNEA): ICD-10-CM

## 2023-06-07 DIAGNOSIS — E78.5 DYSLIPIDEMIA: ICD-10-CM

## 2023-06-07 DIAGNOSIS — F17.200 CURRENT SMOKER: ICD-10-CM

## 2023-06-07 DIAGNOSIS — I25.10 CORONARY ATHEROSCLEROSIS DUE TO CALCIFIED CORONARY LESION: ICD-10-CM

## 2023-06-07 DIAGNOSIS — F33.2 SEVERE EPISODE OF RECURRENT MAJOR DEPRESSIVE DISORDER, WITHOUT PSYCHOTIC FEATURES (CMD): ICD-10-CM

## 2023-06-07 DIAGNOSIS — I10 ESSENTIAL HYPERTENSION: ICD-10-CM

## 2023-06-07 DIAGNOSIS — I25.84 CORONARY ATHEROSCLEROSIS DUE TO CALCIFIED CORONARY LESION: ICD-10-CM

## 2023-06-07 PROCEDURE — 99214 OFFICE O/P EST MOD 30 MIN: CPT | Performed by: INTERNAL MEDICINE

## 2023-06-07 PROCEDURE — 3075F SYST BP GE 130 - 139MM HG: CPT | Performed by: INTERNAL MEDICINE

## 2023-06-07 PROCEDURE — 3079F DIAST BP 80-89 MM HG: CPT | Performed by: INTERNAL MEDICINE

## 2023-06-07 RX ORDER — METOPROLOL SUCCINATE 50 MG/1
TABLET, EXTENDED RELEASE ORAL
Qty: 60 TABLET | Refills: 23 | OUTPATIENT
Start: 2023-06-07

## 2023-06-07 RX ORDER — AMLODIPINE BESYLATE 10 MG/1
10 TABLET ORAL DAILY
Qty: 90 TABLET | Refills: 3 | Status: SHIPPED | OUTPATIENT
Start: 2023-06-07

## 2023-06-07 RX ORDER — SPIRONOLACTONE 25 MG/1
25 TABLET ORAL 2 TIMES DAILY
Qty: 180 TABLET | Refills: 3 | Status: SHIPPED | OUTPATIENT
Start: 2023-06-07 | End: 2023-06-07 | Stop reason: ALTCHOICE

## 2023-06-07 RX ORDER — LOSARTAN POTASSIUM 100 MG/1
100 TABLET ORAL DAILY
Qty: 90 TABLET | Refills: 3 | Status: SHIPPED | OUTPATIENT
Start: 2023-06-07

## 2023-06-07 RX ORDER — EPLERENONE 25 MG/1
25 TABLET, FILM COATED ORAL DAILY
Qty: 180 TABLET | Refills: 3 | Status: SHIPPED | OUTPATIENT
Start: 2023-06-07

## 2023-06-07 RX ORDER — EZETIMIBE 10 MG/1
10 TABLET ORAL DAILY
Qty: 90 TABLET | Refills: 3 | Status: SHIPPED | OUTPATIENT
Start: 2023-06-07

## 2023-06-07 RX ORDER — METOPROLOL SUCCINATE 50 MG/1
50 TABLET, EXTENDED RELEASE ORAL 2 TIMES DAILY
Qty: 180 TABLET | Refills: 3 | Status: SHIPPED | OUTPATIENT
Start: 2023-06-07

## 2023-06-07 RX ORDER — ATORVASTATIN CALCIUM 80 MG/1
80 TABLET, FILM COATED ORAL DAILY
Qty: 90 TABLET | Refills: 3 | Status: SHIPPED | OUTPATIENT
Start: 2023-06-07

## 2023-06-07 RX ORDER — HYDROCHLOROTHIAZIDE 50 MG/1
50 TABLET ORAL DAILY
Qty: 90 TABLET | Refills: 3 | Status: SHIPPED | OUTPATIENT
Start: 2023-06-07

## 2023-06-07 ASSESSMENT — ENCOUNTER SYMPTOMS
RESPIRATORY NEGATIVE: 1
NEUROLOGICAL NEGATIVE: 1
ADENOPATHY: 1

## 2023-06-07 ASSESSMENT — PATIENT HEALTH QUESTIONNAIRE - PHQ9
SUM OF ALL RESPONSES TO PHQ9 QUESTIONS 1 AND 2: 0
SUM OF ALL RESPONSES TO PHQ9 QUESTIONS 1 AND 2: 0
2. FEELING DOWN, DEPRESSED OR HOPELESS: NOT AT ALL
1. LITTLE INTEREST OR PLEASURE IN DOING THINGS: NOT AT ALL
CLINICAL INTERPRETATION OF PHQ2 SCORE: NO FURTHER SCREENING NEEDED

## 2023-06-28 DIAGNOSIS — F33.2 SEVERE EPISODE OF RECURRENT MAJOR DEPRESSIVE DISORDER, WITHOUT PSYCHOTIC FEATURES (CMD): ICD-10-CM

## 2023-06-28 DIAGNOSIS — J30.2 SEASONAL ALLERGIC RHINITIS, UNSPECIFIED TRIGGER: ICD-10-CM

## 2023-06-28 DIAGNOSIS — J32.9 CHRONIC SINUSITIS, UNSPECIFIED LOCATION: ICD-10-CM

## 2023-06-28 DIAGNOSIS — J31.0 CHRONIC RHINITIS: ICD-10-CM

## 2023-06-29 RX ORDER — MONTELUKAST SODIUM 10 MG/1
10 TABLET ORAL
Qty: 30 TABLET | Refills: 3 | Status: SHIPPED | OUTPATIENT
Start: 2023-06-29 | End: 2023-10-27

## 2023-06-29 RX ORDER — QUETIAPINE FUMARATE 25 MG/1
TABLET, FILM COATED ORAL
Qty: 30 TABLET | Refills: 3 | Status: SHIPPED | OUTPATIENT
Start: 2023-06-29

## 2023-06-29 NOTE — PROGRESS NOTES
Chief Complaint:     Chief Complaint   Patient presents with     Cough     Painful coughing/wheezing     Nasal Congestion       No results found for any visits on 06/29/23.    Medical Decision Making:    Vital signs reviewed by Shashi Sprague PA-C  /71 (BP Location: Left arm, Patient Position: Sitting, Cuff Size: Adult Large)   Pulse 91   Temp 97.9  F (36.6  C) (Tympanic)   Resp 22   Wt 117.5 kg (259 lb)   SpO2 97%     Differential Diagnosis:  URI Adult/Peds:  Bronchitis-viral, Pneumonia and Viral upper respiratory illness        ASSESSMENT    1. Viral upper respiratory tract infection with cough        PLAN    Patient is in no acute distress.    Temp is 97.9 in clinic today, lung sounds were clear, and O2 sats at 97% on RA. Afebrile and only throat pain while coughing - forego strep testing at this time. Lungs clear bilaterally & afebrile - pneumonia less likely, no CXR today.  Rest, Push fluids, vaporizer, elevation of head of bed.  Ibuprofen and or Tylenol for any fever or body aches. Discussed honey for cough/throat pain. Warm drinks.   Over the counter cough suppressant- PRN- as discussed.   If symptoms worsen, recheck immediately otherwise follow up with your PCP in 1 week if symptoms are not improving.  Worrisome symptoms discussed with instructions to go to the ED.  Patient verbalized understanding and agreed with this plan.    Labs:    No results found for any visits on 06/29/23.     Vital signs reviewed by Shashi Sprague PA-C  /71 (BP Location: Left arm, Patient Position: Sitting, Cuff Size: Adult Large)   Pulse 91   Temp 97.9  F (36.6  C) (Tympanic)   Resp 22   Wt 117.5 kg (259 lb)   SpO2 97%     Current Meds      Current Outpatient Medications:      amLODIPine (NORVASC) 10 MG tablet, Take 1 tablet (10 mg) by mouth daily, Disp: 45 tablet, Rfl: 0     atorvastatin (LIPITOR) 80 MG tablet, TAKE 1 TABLET BY MOUTH EVERYDAY AT BEDTIME, Disp: , Rfl:      eplerenone (INSPRA) 25 MG tablet,  Take 1 tablet by mouth daily at 2 pm, Disp: , Rfl:      FARXIGA 10 MG TABS tablet, Take 1 tablet by mouth daily at 2 pm, Disp: , Rfl:      hydrochlorothiazide (HYDRODIURIL) 50 MG tablet, Take 1 tablet by mouth daily at 2 pm, Disp: , Rfl:      LAGEVRIO 200 MG capsule, TAKE 4 CAPSULES BY MOUTH EVERY 12 HOURS FOR 5 DAYS, Disp: , Rfl:      losartan (COZAAR) 50 MG tablet, Take 50 mg by mouth daily, Disp: , Rfl:      metoprolol succinate ER (TOPROL XL) 50 MG 24 hr tablet, TAKE 1 TABLET BY MOUTH IN THE MORNING AND IN THE EVENING, Disp: , Rfl:      QUEtiapine (SEROQUEL) 25 MG tablet, TAKE 1 TABLET BY MOUTH EVERYDAY AT BEDTIME, Disp: , Rfl:      triamterene-hydrochlorothiazide (MAXZIDE-25) 37.5-25 MG per tablet, Take 1 tablet by mouth daily., Disp: , Rfl:      amLODIPine (NORVASC) 10 MG tablet, Take 10 mg by mouth daily (Patient not taking: Reported on 6/29/2023), Disp: , Rfl:      atenolol (TENORMIN) 50 MG tablet, Take 50 mg by mouth daily. (Patient not taking: Reported on 1/7/2023), Disp: , Rfl:      benzonatate (TESSALON) 200 MG capsule, Take 1 capsule (200 mg) by mouth 3 times daily as needed for cough, Disp: 30 capsule, Rfl: 0     citalopram (CELEXA) 40 MG tablet, Take 40 mg by mouth daily. (Patient not taking: Reported on 1/7/2023), Disp: , Rfl:      HYDROcodone-acetaminophen 5-325 MG per tablet, 1-2 po every six hours as needed pain (Patient not taking: Reported on 12/20/2019), Disp: 25 tablet, Rfl: 0     predniSONE (DELTASONE) 20 MG tablet, 2 tabs po daily for 5 days (Patient not taking: Reported on 6/29/2023), Disp: 10 tablet, Rfl: 0     tadalafil (CIALIS) 20 MG tablet, Take 1 tablet by mouth daily as needed. (Patient not taking: Reported on 1/7/2023), Disp: , Rfl:      tamsulosin (FLOMAX) 0.4 MG capsule, TAKE 1 CAPSULE BY MOUTH EVERY DAY (Patient not taking: Reported on 1/7/2023), Disp: , Rfl:      triamcinolone (KENALOG) 0.5 % cream, Apply sparingly to affected area three times daily x 10-14 days.  Avoid use for  longer than 2 weeks consecutively. (Patient not taking: Reported on 12/20/2019), Disp: 30 g, Rfl: 0     zolpidem ER (AMBIEN CR) 12.5 MG CR tablet, Take  by mouth nightly as needed. (Patient not taking: Reported on 1/7/2023), Disp: , Rfl:       Respiratory History    occasional episodes of bronchitis      SUBJECTIVE    HPI: Malvin Torres is an 63 year old male who presents with cough. Reprots this cough is nonproductive and started on Tuesday, 6/27. It has progressively worsened. One episode of vomiting after a coughing fit on Tuesday evening. Rhinorrhea and HA accompany the cough. Some throat discomfort while coughing. Denies ear pain, fever, stomach pain, body aches, chills. No known recent sick contacts.     Patient denies any recent travel or exposure to known COVID positive tested individual.      Patient is new to [facility].    ROS:     Review of Systems   Constitutional: Negative.  Negative for chills and fever.   HENT: Positive for congestion and rhinorrhea. Negative for ear discharge, ear pain, sinus pressure, sinus pain and sore throat.    Respiratory: Positive for cough. Negative for chest tightness, shortness of breath and wheezing.    Cardiovascular: Negative.  Negative for chest pain and palpitations.   Gastrointestinal: Positive for vomiting. Negative for abdominal pain, diarrhea and nausea.   Genitourinary: Negative for dysuria, frequency, hematuria and urgency.   Musculoskeletal: Negative.  Negative for myalgias.   Skin: Negative for rash.   Allergic/Immunologic: Negative.  Negative for immunocompromised state.   Neurological: Negative.  Negative for headaches.         Family History   No family history on file.     Problem history  There is no problem list on file for this patient.       Allergies  Allergies   Allergen Reactions     Bupropion Hives     PN: LW Reaction: HIVES     Penicillins Nausea and Vomiting     PN: LW Reaction: Vomiting        Social History  Social History      Socioeconomic History     Marital status:      Spouse name: Not on file     Number of children: Not on file     Years of education: Not on file     Highest education level: Not on file   Occupational History     Not on file   Tobacco Use     Smoking status: Every Day     Packs/day: 0.50     Types: Cigarettes     Smokeless tobacco: Never   Substance and Sexual Activity     Alcohol use: Yes     Comment: daily     Drug use: No     Sexual activity: Yes     Partners: Female   Other Topics Concern     Parent/sibling w/ CABG, MI or angioplasty before 65F 55M? Not Asked   Social History Narrative     Not on file     Social Determinants of Health     Financial Resource Strain: Not on file   Food Insecurity: Not on file   Transportation Needs: Not on file   Physical Activity: Not on file   Stress: Not on file   Social Connections: Not on file   Intimate Partner Violence: Not on file   Housing Stability: Not on file        OBJECTIVE     Vital signs reviewed by Shashi Sprague PA-C  /71 (BP Location: Left arm, Patient Position: Sitting, Cuff Size: Adult Large)   Pulse 91   Temp 97.9  F (36.6  C) (Tympanic)   Resp 22   Wt 117.5 kg (259 lb)   SpO2 97%      Physical Exam  Vitals reviewed.   Constitutional:       General: He is not in acute distress.     Appearance: He is well-developed. He is not ill-appearing, toxic-appearing or diaphoretic.   HENT:      Head: Normocephalic and atraumatic.      Right Ear: Hearing, ear canal and external ear normal. No drainage, swelling or tenderness. Tympanic membrane is bulging. Tympanic membrane is not perforated, erythematous or retracted.      Left Ear: Hearing, tympanic membrane, ear canal and external ear normal. No drainage, swelling or tenderness. Tympanic membrane is not perforated, erythematous, retracted or bulging.      Nose: Congestion and rhinorrhea present. No nasal tenderness or mucosal edema.      Right Turbinates: Not enlarged or swollen.      Left  Turbinates: Not enlarged or swollen.      Right Sinus: No maxillary sinus tenderness or frontal sinus tenderness.      Left Sinus: No maxillary sinus tenderness or frontal sinus tenderness.      Mouth/Throat:      Pharynx: Posterior oropharyngeal erythema present. No pharyngeal swelling, oropharyngeal exudate or uvula swelling.      Tonsils: No tonsillar exudate. 0 on the right. 0 on the left.   Eyes:      General: Lids are normal.         Right eye: No discharge.         Left eye: No discharge.      Conjunctiva/sclera: Conjunctivae normal.      Right eye: Right conjunctiva is not injected. No exudate.     Left eye: Left conjunctiva is not injected. No exudate.     Pupils: Pupils are equal, round, and reactive to light.   Cardiovascular:      Rate and Rhythm: Normal rate and regular rhythm.      Heart sounds: Normal heart sounds. No murmur heard.     No friction rub. No gallop.   Pulmonary:      Effort: Pulmonary effort is normal. No accessory muscle usage, respiratory distress or retractions.      Breath sounds: Normal breath sounds and air entry. No stridor, decreased air movement or transmitted upper airway sounds. No decreased breath sounds, wheezing, rhonchi or rales.   Chest:      Chest wall: No tenderness.   Abdominal:      Palpations: Abdomen is not rigid.   Musculoskeletal:         General: Normal range of motion.      Cervical back: Normal range of motion and neck supple.   Lymphadenopathy:      Head:      Right side of head: No submental, submandibular, tonsillar, preauricular or posterior auricular adenopathy.      Left side of head: No submental, submandibular, tonsillar, preauricular or posterior auricular adenopathy.      Cervical:      Right cervical: No superficial or posterior cervical adenopathy.     Left cervical: No superficial or posterior cervical adenopathy.   Skin:     General: Skin is warm.      Capillary Refill: Capillary refill takes less than 2 seconds.   Neurological:      Mental  Status: He is alert and oriented to person, place, and time.      Cranial Nerves: No cranial nerve deficit.      Sensory: No sensory deficit.      Motor: No abnormal muscle tone.      Coordination: Coordination normal.      Deep Tendon Reflexes: Reflexes normal.   Psychiatric:         Behavior: Behavior normal. Behavior is cooperative.         Thought Content: Thought content normal.         Judgment: Judgment normal.           Shashi Sprague PA-C  6/29/2023, 6:17 PM

## 2023-10-25 DIAGNOSIS — J30.2 SEASONAL ALLERGIC RHINITIS, UNSPECIFIED TRIGGER: ICD-10-CM

## 2023-10-25 DIAGNOSIS — J32.9 CHRONIC SINUSITIS, UNSPECIFIED LOCATION: ICD-10-CM

## 2023-10-25 DIAGNOSIS — J31.0 CHRONIC RHINITIS: ICD-10-CM

## 2023-10-25 DIAGNOSIS — F33.2 SEVERE EPISODE OF RECURRENT MAJOR DEPRESSIVE DISORDER, WITHOUT PSYCHOTIC FEATURES (CMD): ICD-10-CM

## 2023-10-26 ENCOUNTER — TELEPHONE (OUTPATIENT)
Dept: FAMILY MEDICINE | Age: 63
End: 2023-10-26

## 2023-10-27 RX ORDER — MONTELUKAST SODIUM 10 MG/1
10 TABLET ORAL
Qty: 90 TABLET | Refills: 0 | Status: SHIPPED | OUTPATIENT
Start: 2023-10-27

## 2023-10-30 RX ORDER — QUETIAPINE FUMARATE 25 MG/1
TABLET, FILM COATED ORAL
Qty: 30 TABLET | Refills: 3 | OUTPATIENT
Start: 2023-10-30

## 2023-11-06 NOTE — PROGRESS NOTES
ASSESSMENT:  (J01.00) Acute non-recurrent maxillary sinusitis  (primary encounter diagnosis)  Plan: doxycycline hyclate (VIBRA-TABS) 100 MG tablet    PLAN:  Acute sinusitis patient instructions discussed and provided.  Informed the patient to take the antibiotic as prescribed and finish the full course even if symptoms improve.  We discussed trying yogurt with active cultures or probiotic such as Culturelle daily to help prevent diarrhea will take the antibiotic.  We also discussed the need to get plenty rest, drink fluids and use Tylenol as needed for pain with the maximum dose being 4000 mg in a 24-hour period of time.  Informed him he can use a humidifier as humidified air is easier on the nasal and lung tissues than dry air.  Discussed the need to follow-up with his primary care provider should symptoms persist.  Patient acknowledged his understanding of the above plan.    The use of [employer] services may have been used to construct the content in this note; any grammatical or spelling errors are non-intentional. Please contact the author of this note directly if you are in need of any clarification.      SUBJECTIVE:   Malvin Torres is a 63 year old male presenting with a chief complaint of runny nose, stuffy nose, cough - non-productive, ear pain both, facial pain/pressure, headache, body aches, intermittent dizziness and fatigue.  Onset of symptoms was 6 day(s) ago.  Course of illness is worsening.    Patient denies: vomiting and diarrhea  Treatment measures tried include OTC cough and cold medicine.  Predisposing factors include history of pneumonia last month.    ROS:  Negative except noted above.    OBJECTIVE:  BP (!) 141/92   Pulse 94   Temp 98.3  F (36.8  C) (Tympanic)   Wt 117.1 kg (258 lb 3.2 oz)   SpO2 96%   GENERAL APPEARANCE: healthy, alert and no distress  EYES: EOMI,  PERRL, conjunctiva clear  HENT: TM's normal bilaterally, nasal turbinates erythematous, swollen, oral mucous  membranes moist, no erythema noted, and maxillary sinus tenderness   NECK: supple, nontender, no lymphadenopathy  RESP: lungs clear to auscultation - no rales, rhonchi or wheezes  CV: regular rates and rhythm, normal S1 S2, no murmur noted  SKIN: no suspicious lesions or rashes

## 2023-11-06 NOTE — PATIENT INSTRUCTIONS
Take the antibiotic as prescribed and finish the full course even if symptoms improve.  Try yogurt with active cultures or probiotics such as Culturelle daily to help prevent diarrhea while using antibiotics.  Get plenty of rest and drink fluids.  Can use Tylenol as needed for pain.  Maximum dose of Tylenol is 4000mg in a 24 hour period of time.  Use a humidifier as humidified air is easier on the nasal and lung tissues than dry air.  Follow up with your primary care provider should symptoms persist.

## 2023-11-16 NOTE — PATIENT INSTRUCTIONS
Given your current symptoms, symptom severity, accompanying symptoms, health history and the clinical exam today; it is advised you proceed to an emergency department for further evaluation and treatment.    
Yes...

## 2023-11-16 NOTE — PROGRESS NOTES
"Assessment & Plan   (R07.9) Chest pain, unspecified type  (primary encounter diagnosis)  Plan: EKG 12-lead complete w/read - Clinics    (R42) Dizziness    (R09.81) Sinus congestion    The patient and I discussed that given his current symptoms, symptom severity, accompanying symptoms, health history most notably his history of being a smoker, high blood pressure, high cholesterol, age and ethnicity and the clinical exam today; it is advised that he proceed to an emergency department for further evaluation and treatment.  The patient acknowledged his understanding of the above plan and indicated he would proceed to an emergency department for further evaluation and treatment.    The use of [employer] services may have been used to construct the content in this note; any grammatical or spelling errors are non-intentional. Please contact the author of this note directly if you are in need of any clarification.      [facility]    Lyla Coombs is a 63 year old male who presents to clinic today for the following health issues:  Chief Complaint   Patient presents with    Chest Pain     Chest pain beginning last night while laying down. Patient states it feels like \"something hit me in my chest.\" Patient states pain is in center of chest; denies pain radiation. Patient states pain went away during the night and returned this morning. Patient denies shortness of breath, nausea.     Dizziness     Patient reports dizziness that he has had since his last  visit for sinus infection.      HPI  The patient reports having chest pressure starting yesterday that was 8/10.  He indicates this chest pressure is still there today and he rates it as a 6/10.  He has accompanying dizziness and intermittent blurry vision.  He denies any shortness of breath, nausea, vomiting or headache.  He was evaluated and treated for a sinus infection approximately " 10 days ago which he states he has not gotten better.      ROS:  Negative except noted above.    Review of Systems        Objective    /89 (BP Location: Left arm, Patient Position: Sitting, Cuff Size: Adult Large)   Pulse 105   Temp 97.8  F (36.6  C) (Tympanic)   Resp 16   Wt 118.3 kg (260 lb 12.8 oz)   SpO2 98%   Physical Exam   GENERAL: healthy, alert and no distress  EYES: Eyes grossly normal to inspection, PERRL and conjunctivae and sclerae normal  HENT: normal cephalic/atraumatic, ear canals and TM's normal, nasal mucosa edematous and erythematous, oropharynx clear, oral mucous membranes moist, and maxillary sinus tenderness upon palpation  NECK: no adenopathy, no asymmetry, masses, or scars and thyroid normal to palpation  RESP: lungs clear to auscultation - no rales, rhonchi or wheezes  CV: tachycardia, normal S1 S2, no S3 or S4, no murmur, click or rub, peripheral pulses strong, and no peripheral edema  MS: no gross musculoskeletal defects noted, no edema  SKIN: no suspicious lesions or rashes  NEURO: Normal strength and tone, mentation intact and speech normal  PSYCH: mentation appears normal, affect normal/bright

## 2024-01-01 ENCOUNTER — APPOINTMENT (OUTPATIENT)
Dept: CT IMAGING | Facility: CLINIC | Age: 64
End: 2024-01-01
Attending: SURGERY
Payer: COMMERCIAL

## 2024-01-01 ENCOUNTER — APPOINTMENT (OUTPATIENT)
Dept: CARDIOLOGY | Facility: CLINIC | Age: 64
End: 2024-01-01
Attending: SURGERY
Payer: COMMERCIAL

## 2024-01-01 ENCOUNTER — APPOINTMENT (OUTPATIENT)
Dept: GENERAL RADIOLOGY | Facility: CLINIC | Age: 64
End: 2024-01-01
Attending: SURGERY
Payer: COMMERCIAL

## 2024-01-01 ENCOUNTER — APPOINTMENT (OUTPATIENT)
Dept: CT IMAGING | Facility: CLINIC | Age: 64
End: 2024-01-01
Attending: NURSE PRACTITIONER
Payer: COMMERCIAL

## 2024-01-01 ENCOUNTER — LAB REQUISITION (OUTPATIENT)
Dept: LAB | Facility: CLINIC | Age: 64
End: 2024-01-01

## 2024-01-01 ENCOUNTER — HOSPITAL ENCOUNTER (INPATIENT)
Facility: CLINIC | Age: 64
LOS: 1 days | End: 2024-06-16
Attending: SURGERY | Admitting: SURGERY
Payer: COMMERCIAL

## 2024-01-01 ENCOUNTER — APPOINTMENT (OUTPATIENT)
Dept: ULTRASOUND IMAGING | Facility: CLINIC | Age: 64
End: 2024-01-01
Attending: NURSE PRACTITIONER
Payer: COMMERCIAL

## 2024-01-01 VITALS
BODY MASS INDEX: 41.43 KG/M2 | OXYGEN SATURATION: 87 % | SYSTOLIC BLOOD PRESSURE: 112 MMHG | HEIGHT: 68 IN | TEMPERATURE: 98.6 F | HEART RATE: 87 BPM | DIASTOLIC BLOOD PRESSURE: 32 MMHG | WEIGHT: 273.37 LBS

## 2024-01-01 DIAGNOSIS — E78.5 DYSLIPIDEMIA: Primary | ICD-10-CM

## 2024-01-01 DIAGNOSIS — I10 ESSENTIAL HYPERTENSION: ICD-10-CM

## 2024-01-01 LAB
ABO/RH(D): NORMAL
ACT BLD: 140 SECONDS (ref 74–150)
ACT BLD: 153 SECONDS (ref 74–150)
ACT BLD: 220 SECONDS (ref 74–150)
ALBUMIN SERPL BCG-MCNC: 2.7 G/DL (ref 3.5–5.2)
ALBUMIN SERPL BCG-MCNC: 2.7 G/DL (ref 3.5–5.2)
ALBUMIN SERPL BCG-MCNC: 2.8 G/DL (ref 3.5–5.2)
ALLEN'S TEST: ABNORMAL
ALP SERPL-CCNC: 75 U/L (ref 40–150)
ALP SERPL-CCNC: 79 U/L (ref 40–150)
ALT SERPL W P-5'-P-CCNC: 897 U/L (ref 0–70)
ALT SERPL W P-5'-P-CCNC: 989 U/L (ref 0–70)
AMMONIA PLAS-SCNC: 59 UMOL/L (ref 16–60)
AMYLASE SERPL-CCNC: 242 U/L (ref 28–100)
ANION GAP SERPL CALCULATED.3IONS-SCNC: 11 MMOL/L (ref 7–15)
ANION GAP SERPL CALCULATED.3IONS-SCNC: 12 MMOL/L (ref 7–15)
ANION GAP SERPL CALCULATED.3IONS-SCNC: 12 MMOL/L (ref 7–15)
ANION GAP SERPL CALCULATED.3IONS-SCNC: 16 MMOL/L (ref 7–15)
ANION GAP SERPL CALCULATED.3IONS-SCNC: 18 MMOL/L (ref 7–15)
ANION GAP SERPL CALCULATED.3IONS-SCNC: 18 MMOL/L (ref 7–15)
ANTIBODY SCREEN: NEGATIVE
APTT PPP: 177 SECONDS (ref 22–38)
APTT PPP: 26 SECONDS (ref 22–38)
APTT PPP: 27 SECONDS (ref 22–38)
AST SERPL W P-5'-P-CCNC: 1521 U/L (ref 0–45)
AST SERPL W P-5'-P-CCNC: 666 U/L (ref 0–45)
BASE EXCESS BLDA CALC-SCNC: 4.9 MMOL/L (ref -3–3)
BASE EXCESS BLDA CALC-SCNC: 5.8 MMOL/L (ref -3–3)
BASE EXCESS BLDA CALC-SCNC: 6.6 MMOL/L (ref -3–3)
BASE EXCESS BLDA CALC-SCNC: 6.9 MMOL/L (ref -3–3)
BASE EXCESS BLDA CALC-SCNC: 7.4 MMOL/L (ref -3–3)
BASE EXCESS BLDA CALC-SCNC: 8.7 MMOL/L (ref -3–3)
BASE EXCESS BLDA CALC-SCNC: 8.8 MMOL/L (ref -3–3)
BASE EXCESS BLDA CALC-SCNC: 9.1 MMOL/L (ref -3–3)
BASE EXCESS BLDA CALC-SCNC: 9.2 MMOL/L (ref -3–3)
BASE EXCESS BLDA CALC-SCNC: 9.3 MMOL/L (ref -3–3)
BASE EXCESS BLDA CALC-SCNC: 9.5 MMOL/L (ref -3–3)
BASE EXCESS BLDV CALC-SCNC: 10.1 MMOL/L (ref -3–3)
BASE EXCESS BLDV CALC-SCNC: 4.4 MMOL/L (ref -3–3)
BASE EXCESS BLDV CALC-SCNC: 8.2 MMOL/L (ref -3–3)
BASOPHILS # BLD AUTO: 0 10E3/UL (ref 0–0.2)
BASOPHILS # BLD AUTO: ABNORMAL 10*3/UL
BASOPHILS NFR BLD AUTO: 0 %
BASOPHILS NFR BLD AUTO: ABNORMAL %
BILIRUB DIRECT SERPL-MCNC: 0.74 MG/DL (ref 0–0.3)
BILIRUB SERPL-MCNC: 1.1 MG/DL
BILIRUB SERPL-MCNC: 1.2 MG/DL
BLD PROD TYP BPU: NORMAL
BLOOD COMPONENT TYPE: NORMAL
BUN SERPL-MCNC: 80.6 MG/DL (ref 8–23)
BUN SERPL-MCNC: 80.9 MG/DL (ref 8–23)
BUN SERPL-MCNC: 80.9 MG/DL (ref 8–23)
BUN SERPL-MCNC: 81.1 MG/DL (ref 8–23)
BUN SERPL-MCNC: 85.4 MG/DL (ref 8–23)
BUN SERPL-MCNC: 87.9 MG/DL (ref 8–23)
C PNEUM DNA SPEC QL NAA+PROBE: NOT DETECTED
CA-I BLD-MCNC: 4.6 MG/DL (ref 4.4–5.2)
CA-I BLD-MCNC: 4.8 MG/DL (ref 4.4–5.2)
CA-I BLD-MCNC: 4.8 MG/DL (ref 4.4–5.2)
CA-I BLD-MCNC: 4.9 MG/DL (ref 4.4–5.2)
CALCIUM SERPL-MCNC: 8.2 MG/DL (ref 8.8–10.2)
CALCIUM SERPL-MCNC: 8.2 MG/DL (ref 8.8–10.2)
CALCIUM SERPL-MCNC: 8.7 MG/DL (ref 8.8–10.2)
CALCIUM SERPL-MCNC: 9.3 MG/DL (ref 8.8–10.2)
CALCIUM SERPL-MCNC: 9.5 MG/DL (ref 8.8–10.2)
CALCIUM SERPL-MCNC: 9.5 MG/DL (ref 8.8–10.2)
CF REDUC 30M P MA P HEP LENFR BLD TEG: 0 % (ref 0–8)
CF REDUC 60M P MA LENFR BLD TEG: 0 % (ref 0–15)
CF REDUC 60M P MA P HEPASE LENFR BLD TEG: 0 % (ref 0–15)
CFT BLD TEG: 1.6 MINUTE (ref 1–3)
CFT P HPASE BLD TEG: 1.4 MINUTE (ref 1–3)
CHLORIDE SERPL-SCNC: 100 MMOL/L (ref 98–107)
CHLORIDE SERPL-SCNC: 102 MMOL/L (ref 98–107)
CHLORIDE SERPL-SCNC: 98 MMOL/L (ref 98–107)
CHLORIDE SERPL-SCNC: 98 MMOL/L (ref 98–107)
CI (COAGULATION INDEX)(Z) NON NATIVE: 1.9 (ref -3–3)
CI (COAGULATION INDEX)(Z): 0.9 (ref -3–3)
CK SERPL-CCNC: 312 U/L (ref 39–308)
CK SERPL-CCNC: 454 U/L (ref 39–308)
CLOT ANGLE BLD TEG: 70.2 DEGREES (ref 53–72)
CLOT ANGLE P HPASE BLD TEG: 69.8 DEGREES (ref 53–72)
CLOT INIT BLD TEG: 3.2 MINUTE (ref 5–10)
CLOT INIT KAOL IND TO POST HEP NEUT TRTO: 1.1 {RATIO}
CLOT INIT KAOLIN IND BLD US: 117 SEC (ref 113–166)
CLOT INIT KAOLIN IND P HEP NEUT BLD US: 110 SEC (ref 103–153)
CLOT INIT P HPASE BLD TEG: 5.2 MINUTE (ref 5–10)
CLOT LYSIS 30M P MA LENFR BLD TEG: 0 % (ref 0–8)
CLOT STIFF PLT CONT BLD CALC: 9 HPA (ref 11.9–29.8)
CLOT STIFF TF IND P HEP NEUT BLD US: 10.8 HPA (ref 13–33.2)
CLOT STIFF TF IND+IIB-IIIA INH P HEP NEU: 1.8 HPA (ref 1–3.7)
CLOT STRENGTH BLD TEG: 6.6 KD/SC (ref 4.5–11)
CLOT STRENGTH P HPASE BLD TEG: 7.4 KD/SC (ref 4.5–11)
CODING SYSTEM: NORMAL
COHGB MFR BLD: 74.8 % (ref 96–97)
COHGB MFR BLD: 77.1 % (ref 96–97)
COHGB MFR BLD: 78.5 % (ref 96–97)
COHGB MFR BLD: 80.2 % (ref 96–97)
COHGB MFR BLD: 80.4 % (ref 96–97)
COHGB MFR BLD: 81.6 % (ref 96–97)
COHGB MFR BLD: 82.2 % (ref 96–97)
COHGB MFR BLD: 88.1 % (ref 96–97)
COHGB MFR BLD: 98.7 % (ref 96–97)
CREAT SERPL-MCNC: 2.63 MG/DL (ref 0.67–1.17)
CREAT SERPL-MCNC: 2.64 MG/DL (ref 0.67–1.17)
CREAT SERPL-MCNC: 2.9 MG/DL (ref 0.67–1.17)
CREAT SERPL-MCNC: 3.06 MG/DL (ref 0.67–1.17)
CREAT SERPL-MCNC: 3.06 MG/DL (ref 0.67–1.17)
CREAT SERPL-MCNC: 3.13 MG/DL (ref 0.67–1.17)
CROSSMATCH: NORMAL
CRP SERPL-MCNC: 10.1 MG/L
D DIMER PPP FEU-MCNC: 15.46 UG/ML FEU (ref 0–0.5)
DEPRECATED HCO3 PLAS-SCNC: 24 MMOL/L (ref 22–29)
DEPRECATED HCO3 PLAS-SCNC: 24 MMOL/L (ref 22–29)
DEPRECATED HCO3 PLAS-SCNC: 27 MMOL/L (ref 22–29)
DEPRECATED HCO3 PLAS-SCNC: 29 MMOL/L (ref 22–29)
EGFRCR SERPLBLD CKD-EPI 2021: 21 ML/MIN/1.73M2
EGFRCR SERPLBLD CKD-EPI 2021: 22 ML/MIN/1.73M2
EGFRCR SERPLBLD CKD-EPI 2021: 22 ML/MIN/1.73M2
EGFRCR SERPLBLD CKD-EPI 2021: 23 ML/MIN/1.73M2
EGFRCR SERPLBLD CKD-EPI 2021: 26 ML/MIN/1.73M2
EGFRCR SERPLBLD CKD-EPI 2021: 26 ML/MIN/1.73M2
EOSINOPHIL # BLD AUTO: 0 10E3/UL (ref 0–0.7)
EOSINOPHIL # BLD AUTO: ABNORMAL 10*3/UL
EOSINOPHIL NFR BLD AUTO: 0 %
EOSINOPHIL NFR BLD AUTO: ABNORMAL %
ERYTHROCYTE [DISTWIDTH] IN BLOOD BY AUTOMATED COUNT: 15 % (ref 10–15)
ERYTHROCYTE [DISTWIDTH] IN BLOOD BY AUTOMATED COUNT: 15.2 % (ref 10–15)
ERYTHROCYTE [DISTWIDTH] IN BLOOD BY AUTOMATED COUNT: 15.3 % (ref 10–15)
ERYTHROCYTE [DISTWIDTH] IN BLOOD BY AUTOMATED COUNT: 16.4 % (ref 10–15)
ERYTHROCYTE [SEDIMENTATION RATE] IN BLOOD BY WESTERGREN METHOD: 20 MM/HR (ref 0–20)
FIBRINOGEN PPP-MCNC: 173 MG/DL (ref 170–490)
FIBRINOGEN PPP-MCNC: 199 MG/DL (ref 170–490)
FLUAV H1 2009 PAND RNA SPEC QL NAA+PROBE: NOT DETECTED
FLUAV H1 RNA SPEC QL NAA+PROBE: NOT DETECTED
FLUAV H3 RNA SPEC QL NAA+PROBE: NOT DETECTED
FLUAV RNA SPEC QL NAA+PROBE: NOT DETECTED
FLUBV RNA SPEC QL NAA+PROBE: NOT DETECTED
GLUCOSE BLDC GLUCOMTR-MCNC: 120 MG/DL (ref 70–99)
GLUCOSE BLDC GLUCOMTR-MCNC: 122 MG/DL (ref 70–99)
GLUCOSE BLDC GLUCOMTR-MCNC: 146 MG/DL (ref 70–99)
GLUCOSE BLDC GLUCOMTR-MCNC: 171 MG/DL (ref 70–99)
GLUCOSE SERPL-MCNC: 120 MG/DL (ref 70–99)
GLUCOSE SERPL-MCNC: 129 MG/DL (ref 70–99)
GLUCOSE SERPL-MCNC: 129 MG/DL (ref 70–99)
GLUCOSE SERPL-MCNC: 155 MG/DL (ref 70–99)
GLUCOSE SERPL-MCNC: 183 MG/DL (ref 70–99)
GLUCOSE SERPL-MCNC: 183 MG/DL (ref 70–99)
HADV DNA SPEC QL NAA+PROBE: NOT DETECTED
HCO3 BLD-SCNC: 28 MMOL/L (ref 21–28)
HCO3 BLD-SCNC: 30 MMOL/L (ref 21–28)
HCO3 BLD-SCNC: 32 MMOL/L (ref 21–28)
HCO3 BLD-SCNC: 33 MMOL/L (ref 21–28)
HCO3 BLD-SCNC: 34 MMOL/L (ref 21–28)
HCO3 BLD-SCNC: 34 MMOL/L (ref 21–28)
HCO3 BLDA-SCNC: 26 MMOL/L (ref 21–28)
HCO3 BLDA-SCNC: 32 MMOL/L (ref 21–28)
HCO3 BLDV-SCNC: 27 MMOL/L (ref 21–28)
HCO3 BLDV-SCNC: 34 MMOL/L (ref 21–28)
HCO3 BLDV-SCNC: 35 MMOL/L (ref 21–28)
HCOV PNL SPEC NAA+PROBE: NOT DETECTED
HCT VFR BLD AUTO: 20.7 % (ref 40–53)
HCT VFR BLD AUTO: 22.7 % (ref 40–53)
HCT VFR BLD AUTO: 23.1 % (ref 40–53)
HCT VFR BLD AUTO: 26.9 % (ref 40–53)
HGB BLD-MCNC: 7.3 G/DL (ref 13.3–17.7)
HGB BLD-MCNC: 7.8 G/DL (ref 13.3–17.7)
HGB BLD-MCNC: 8.2 G/DL (ref 13.3–17.7)
HGB BLD-MCNC: 9.4 G/DL (ref 13.3–17.7)
HMPV RNA SPEC QL NAA+PROBE: NOT DETECTED
HOLD SPECIMEN HIT: NORMAL
HPIV1 RNA SPEC QL NAA+PROBE: NOT DETECTED
HPIV2 RNA SPEC QL NAA+PROBE: NOT DETECTED
HPIV3 RNA SPEC QL NAA+PROBE: NOT DETECTED
HPIV4 RNA SPEC QL NAA+PROBE: NOT DETECTED
IMM GRANULOCYTES # BLD: 0.2 10E3/UL
IMM GRANULOCYTES # BLD: 0.3 10E3/UL
IMM GRANULOCYTES # BLD: 0.4 10E3/UL
IMM GRANULOCYTES # BLD: ABNORMAL 10*3/UL
IMM GRANULOCYTES NFR BLD: 1 %
IMM GRANULOCYTES NFR BLD: 2 %
IMM GRANULOCYTES NFR BLD: 3 %
IMM GRANULOCYTES NFR BLD: ABNORMAL %
INR PPP: 1.4 (ref 0.85–1.15)
INR PPP: 1.43 (ref 0.85–1.15)
INR PPP: 1.66 (ref 0.85–1.15)
ISSUE DATE AND TIME: NORMAL
LACTATE SERPL-SCNC: 1.7 MMOL/L (ref 0.7–2)
LACTATE SERPL-SCNC: 1.7 MMOL/L (ref 0.7–2)
LACTATE SERPL-SCNC: 4 MMOL/L (ref 0.7–2)
LACTATE SERPL-SCNC: 4.7 MMOL/L (ref 0.7–2)
LACTATE SERPL-SCNC: 5.9 MMOL/L (ref 0.7–2)
LIPASE SERPL-CCNC: 105 U/L (ref 13–60)
LVEF ECHO: NORMAL
LYMPHOCYTES # BLD AUTO: 0.1 10E3/UL (ref 0.8–5.3)
LYMPHOCYTES # BLD AUTO: ABNORMAL 10*3/UL
LYMPHOCYTES NFR BLD AUTO: 0 %
LYMPHOCYTES NFR BLD AUTO: 1 %
LYMPHOCYTES NFR BLD AUTO: 1 %
LYMPHOCYTES NFR BLD AUTO: ABNORMAL %
M PNEUMO DNA SPEC QL NAA+PROBE: NOT DETECTED
MAGNESIUM SERPL-MCNC: 2.1 MG/DL (ref 1.7–2.3)
MAGNESIUM SERPL-MCNC: 2.1 MG/DL (ref 1.7–2.3)
MAGNESIUM SERPL-MCNC: 2.3 MG/DL (ref 1.7–2.3)
MCF BLD TEG: 57 MM (ref 50–70)
MCF P HPASE BLD TEG: 59.8 MM (ref 50–70)
MCH RBC QN AUTO: 29.6 PG (ref 26.5–33)
MCH RBC QN AUTO: 30.1 PG (ref 26.5–33)
MCH RBC QN AUTO: 30.8 PG (ref 26.5–33)
MCH RBC QN AUTO: 31.1 PG (ref 26.5–33)
MCHC RBC AUTO-ENTMCNC: 34.4 G/DL (ref 31.5–36.5)
MCHC RBC AUTO-ENTMCNC: 34.9 G/DL (ref 31.5–36.5)
MCHC RBC AUTO-ENTMCNC: 35.3 G/DL (ref 31.5–36.5)
MCHC RBC AUTO-ENTMCNC: 35.5 G/DL (ref 31.5–36.5)
MCV RBC AUTO: 85 FL (ref 78–100)
MCV RBC AUTO: 87 FL (ref 78–100)
MCV RBC AUTO: 88 FL (ref 78–100)
MCV RBC AUTO: 88 FL (ref 78–100)
MONOCYTES # BLD AUTO: 0.3 10E3/UL (ref 0–1.3)
MONOCYTES # BLD AUTO: 0.3 10E3/UL (ref 0–1.3)
MONOCYTES # BLD AUTO: 0.4 10E3/UL (ref 0–1.3)
MONOCYTES # BLD AUTO: ABNORMAL 10*3/UL
MONOCYTES NFR BLD AUTO: 2 %
MONOCYTES NFR BLD AUTO: ABNORMAL %
MRSA DNA SPEC QL NAA+PROBE: NEGATIVE
MRSA DNA SPEC QL NAA+PROBE: NEGATIVE
NEUTROPHILS # BLD AUTO: 15.7 10E3/UL (ref 1.6–8.3)
NEUTROPHILS # BLD AUTO: 16.4 10E3/UL (ref 1.6–8.3)
NEUTROPHILS # BLD AUTO: 22.4 10E3/UL (ref 1.6–8.3)
NEUTROPHILS # BLD AUTO: ABNORMAL 10*3/UL
NEUTROPHILS NFR BLD AUTO: 94 %
NEUTROPHILS NFR BLD AUTO: 95 %
NEUTROPHILS NFR BLD AUTO: 97 %
NEUTROPHILS NFR BLD AUTO: ABNORMAL %
NRBC # BLD AUTO: 0.4 10E3/UL
NRBC # BLD AUTO: 0.4 10E3/UL
NRBC # BLD AUTO: 0.6 10E3/UL
NRBC # BLD AUTO: 0.9 10E3/UL
NRBC BLD AUTO-RTO: 2 /100
NRBC BLD AUTO-RTO: 2 /100
NRBC BLD AUTO-RTO: 4 /100
NRBC BLD AUTO-RTO: 6 /100
NT-PROBNP SERPL-MCNC: 8786 PG/ML (ref 0–900)
O2/TOTAL GAS SETTING VFR VENT: 100 %
O2/TOTAL GAS SETTING VFR VENT: 80 %
OXYHGB MFR BLDA: 98 % (ref 75–100)
OXYHGB MFR BLDA: 99 % (ref 75–100)
OXYHGB MFR BLDV: 32 % (ref 70–75)
OXYHGB MFR BLDV: 70 %
OXYHGB MFR BLDV: 73 %
PA ADP BLD-ACNC: 182 PRU
PCO2 BLD: 29 MM HG (ref 35–45)
PCO2 BLD: 32 MM HG (ref 35–45)
PCO2 BLD: 35 MM HG (ref 35–45)
PCO2 BLD: 36 MM HG (ref 35–45)
PCO2 BLD: 39 MM HG (ref 35–45)
PCO2 BLD: 44 MM HG (ref 35–45)
PCO2 BLD: 46 MM HG (ref 35–45)
PCO2 BLD: 48 MM HG (ref 35–45)
PCO2 BLD: 49 MM HG (ref 35–45)
PCO2 BLDA: 25 MM HG (ref 35–45)
PCO2 BLDA: 37 MM HG (ref 35–45)
PCO2 BLDV: 31 MM HG (ref 40–50)
PCO2 BLDV: 46 MM HG (ref 40–50)
PCO2 BLDV: 52 MM HG (ref 40–50)
PEEP: 10 CM H2O
PEEP: 10 CM H2O
PF4 HEPARIN CMPLX AB SER QL: NEGATIVE
PH BLD: 7.43 [PH] (ref 7.35–7.45)
PH BLD: 7.45 [PH] (ref 7.35–7.45)
PH BLD: 7.46 [PH] (ref 7.35–7.45)
PH BLD: 7.49 [PH] (ref 7.35–7.45)
PH BLD: 7.54 [PH] (ref 7.35–7.45)
PH BLD: 7.56 [PH] (ref 7.35–7.45)
PH BLD: 7.56 [PH] (ref 7.35–7.45)
PH BLD: 7.57 [PH] (ref 7.35–7.45)
PH BLD: 7.59 [PH] (ref 7.35–7.45)
PH BLDA: 7.55 [PH] (ref 7.35–7.45)
PH BLDA: 7.62 [PH] (ref 7.35–7.45)
PH BLDV: 7.42 [PH] (ref 7.32–7.43)
PH BLDV: 7.49 [PH] (ref 7.32–7.43)
PH BLDV: 7.55 [PH] (ref 7.32–7.43)
PHOSPHATE SERPL-MCNC: 4.1 MG/DL (ref 2.5–4.5)
PHOSPHATE SERPL-MCNC: 4.6 MG/DL (ref 2.5–4.5)
PHOSPHATE SERPL-MCNC: 6.5 MG/DL (ref 2.5–4.5)
PLATELET # BLD AUTO: 46 10E3/UL (ref 150–450)
PLATELET # BLD AUTO: 71 10E3/UL (ref 150–450)
PLATELET # BLD AUTO: 80 10E3/UL (ref 150–450)
PLATELET # BLD AUTO: 84 10E3/UL (ref 150–450)
PO2 BLD: 39 MM HG (ref 80–105)
PO2 BLD: 40 MM HG (ref 80–105)
PO2 BLD: 41 MM HG (ref 80–105)
PO2 BLD: 42 MM HG (ref 80–105)
PO2 BLD: 42 MM HG (ref 80–105)
PO2 BLD: 44 MM HG (ref 80–105)
PO2 BLD: 44 MM HG (ref 80–105)
PO2 BLD: 46 MM HG (ref 80–105)
PO2 BLD: 82 MM HG (ref 80–105)
PO2 BLDA: 413 MM HG (ref 80–105)
PO2 BLDA: 423 MM HG (ref 80–105)
PO2 BLDV: 22 MM HG (ref 25–47)
PO2 BLDV: 34 MM HG (ref 25–47)
PO2 BLDV: 38 MM HG (ref 25–47)
POTASSIUM SERPL-SCNC: 4.3 MMOL/L (ref 3.4–5.3)
POTASSIUM SERPL-SCNC: 4.4 MMOL/L (ref 3.4–5.3)
POTASSIUM SERPL-SCNC: 4.6 MMOL/L (ref 3.4–5.3)
POTASSIUM SERPL-SCNC: 4.6 MMOL/L (ref 3.4–5.3)
POTASSIUM SERPL-SCNC: 4.9 MMOL/L (ref 3.4–5.3)
POTASSIUM SERPL-SCNC: 4.9 MMOL/L (ref 3.4–5.3)
PROCALCITONIN SERPL IA-MCNC: 0.39 NG/ML
PROT SERPL-MCNC: 4.1 G/DL (ref 6.4–8.3)
PROT SERPL-MCNC: 4.3 G/DL (ref 6.4–8.3)
RADIOLOGIST FLAGS: ABNORMAL
RADIOLOGIST FLAGS: ABNORMAL
RBC # BLD AUTO: 2.37 10E6/UL (ref 4.4–5.9)
RBC # BLD AUTO: 2.59 10E6/UL (ref 4.4–5.9)
RBC # BLD AUTO: 2.64 10E6/UL (ref 4.4–5.9)
RBC # BLD AUTO: 3.18 10E6/UL (ref 4.4–5.9)
RHEUMATOID FACT SERPL-ACNC: <10 IU/ML
RSV RNA SPEC QL NAA+PROBE: NOT DETECTED
RSV RNA SPEC QL NAA+PROBE: NOT DETECTED
RV+EV RNA SPEC QL NAA+PROBE: NOT DETECTED
SA TARGET DNA: POSITIVE
SA TARGET DNA: POSITIVE
SAO2 % BLDA: 72 % (ref 92–100)
SAO2 % BLDA: 74 % (ref 92–100)
SAO2 % BLDA: 76 % (ref 92–100)
SAO2 % BLDA: 78 % (ref 92–100)
SAO2 % BLDA: 78 % (ref 92–100)
SAO2 % BLDA: 79 % (ref 92–100)
SAO2 % BLDA: 80 % (ref 92–100)
SAO2 % BLDA: 86 % (ref 92–100)
SAO2 % BLDA: 96 % (ref 92–100)
SAO2 % BLDA: >100 % (ref 96–97)
SAO2 % BLDA: >100 % (ref 96–97)
SAO2 % BLDV: 33.3 % (ref 70–75)
SAO2 % BLDV: 72.4 % (ref 70–75)
SAO2 % BLDV: 74.1 % (ref 70–75)
SARS-COV-2 RNA RESP QL NAA+PROBE: NEGATIVE
SODIUM SERPL-SCNC: 140 MMOL/L (ref 135–145)
SODIUM SERPL-SCNC: 141 MMOL/L (ref 135–145)
SODIUM SERPL-SCNC: 143 MMOL/L (ref 135–145)
SODIUM SERPL-SCNC: 143 MMOL/L (ref 135–145)
SPECIMEN EXPIRATION DATE: NORMAL
TROPONIN T SERPL HS-MCNC: 1358 NG/L
TROPONIN T SERPL HS-MCNC: 1497 NG/L
TROPONIN T SERPL HS-MCNC: 2607 NG/L
UFH PPP CHRO-ACNC: 0.56 IU/ML
UFH PPP CHRO-ACNC: <0.1 IU/ML
UFH PPP CHRO-ACNC: <0.1 IU/ML
UNIT ABO/RH: NORMAL
UNIT NUMBER: NORMAL
UNIT STATUS: NORMAL
UNIT TYPE ISBT: 5100
WBC # BLD AUTO: 15.6 10E3/UL (ref 4–11)
WBC # BLD AUTO: 16.6 10E3/UL (ref 4–11)
WBC # BLD AUTO: 17.2 10E3/UL (ref 4–11)
WBC # BLD AUTO: 23 10E3/UL (ref 4–11)

## 2024-01-01 PROCEDURE — 85025 COMPLETE CBC W/AUTO DIFF WBC: CPT | Performed by: SURGERY

## 2024-01-01 PROCEDURE — 250N000011 HC RX IP 250 OP 636

## 2024-01-01 PROCEDURE — 84484 ASSAY OF TROPONIN QUANT: CPT | Performed by: NURSE PRACTITIONER

## 2024-01-01 PROCEDURE — 33948 ECMO/ECLS DAILY MGMT-VENOUS: CPT | Performed by: SURGERY

## 2024-01-01 PROCEDURE — 82248 BILIRUBIN DIRECT: CPT | Performed by: INTERNAL MEDICINE

## 2024-01-01 PROCEDURE — 99291 CRITICAL CARE FIRST HOUR: CPT | Mod: 25 | Performed by: SURGERY

## 2024-01-01 PROCEDURE — 86147 CARDIOLIPIN ANTIBODY EA IG: CPT | Performed by: INTERNAL MEDICINE

## 2024-01-01 PROCEDURE — 93306 TTE W/DOPPLER COMPLETE: CPT

## 2024-01-01 PROCEDURE — 86225 DNA ANTIBODY NATIVE: CPT

## 2024-01-01 PROCEDURE — 86022 PLATELET ANTIBODIES: CPT

## 2024-01-01 PROCEDURE — 71275 CT ANGIOGRAPHY CHEST: CPT | Mod: 26 | Performed by: STUDENT IN AN ORGANIZED HEALTH CARE EDUCATION/TRAINING PROGRAM

## 2024-01-01 PROCEDURE — 83735 ASSAY OF MAGNESIUM: CPT | Performed by: SURGERY

## 2024-01-01 PROCEDURE — 85610 PROTHROMBIN TIME: CPT | Performed by: SURGERY

## 2024-01-01 PROCEDURE — 87633 RESP VIRUS 12-25 TARGETS: CPT | Performed by: NURSE PRACTITIONER

## 2024-01-01 PROCEDURE — 250N000013 HC RX MED GY IP 250 OP 250 PS 637

## 2024-01-01 PROCEDURE — 83516 IMMUNOASSAY NONANTIBODY: CPT | Performed by: INTERNAL MEDICINE

## 2024-01-01 PROCEDURE — 82805 BLOOD GASES W/O2 SATURATION: CPT | Performed by: SURGERY

## 2024-01-01 PROCEDURE — 86160 COMPLEMENT ANTIGEN: CPT

## 2024-01-01 PROCEDURE — P9016 RBC LEUKOCYTES REDUCED: HCPCS | Performed by: SURGERY

## 2024-01-01 PROCEDURE — 85396 CLOTTING ASSAY WHOLE BLOOD: CPT | Performed by: SURGERY

## 2024-01-01 PROCEDURE — 31622 DX BRONCHOSCOPE/WASH: CPT | Performed by: INTERNAL MEDICINE

## 2024-01-01 PROCEDURE — 71250 CT THORAX DX C-: CPT

## 2024-01-01 PROCEDURE — 258N000003 HC RX IP 258 OP 636: Mod: JZ

## 2024-01-01 PROCEDURE — 87106 FUNGI IDENTIFICATION YEAST: CPT | Performed by: NURSE PRACTITIONER

## 2024-01-01 PROCEDURE — P9059 PLASMA, FRZ BETWEEN 8-24HOUR: HCPCS | Performed by: SURGERY

## 2024-01-01 PROCEDURE — 70450 CT HEAD/BRAIN W/O DYE: CPT

## 2024-01-01 PROCEDURE — 33946 ECMO/ECLS INITIATION VENOUS: CPT | Performed by: SURGERY

## 2024-01-01 PROCEDURE — 87641 MR-STAPH DNA AMP PROBE: CPT | Performed by: SURGERY

## 2024-01-01 PROCEDURE — 85520 HEPARIN ASSAY: CPT | Performed by: SURGERY

## 2024-01-01 PROCEDURE — P9037 PLATE PHERES LEUKOREDU IRRAD: HCPCS | Performed by: SURGERY

## 2024-01-01 PROCEDURE — 84484 ASSAY OF TROPONIN QUANT: CPT | Performed by: SURGERY

## 2024-01-01 PROCEDURE — 86038 ANTINUCLEAR ANTIBODIES: CPT | Performed by: INTERNAL MEDICINE

## 2024-01-01 PROCEDURE — 93010 ELECTROCARDIOGRAM REPORT: CPT | Performed by: INTERNAL MEDICINE

## 2024-01-01 PROCEDURE — 87635 SARS-COV-2 COVID-19 AMP PRB: CPT | Performed by: NURSE PRACTITIONER

## 2024-01-01 PROCEDURE — 250N000011 HC RX IP 250 OP 636: Performed by: SURGERY

## 2024-01-01 PROCEDURE — 250N000009 HC RX 250: Performed by: INTERNAL MEDICINE

## 2024-01-01 PROCEDURE — C9460 INJECTION, CANGRELOR: HCPCS | Mod: JZ

## 2024-01-01 PROCEDURE — 94003 VENT MGMT INPAT SUBQ DAY: CPT

## 2024-01-01 PROCEDURE — 70450 CT HEAD/BRAIN W/O DYE: CPT | Mod: 26 | Performed by: RADIOLOGY

## 2024-01-01 PROCEDURE — 83880 ASSAY OF NATRIURETIC PEPTIDE: CPT | Performed by: INTERNAL MEDICINE

## 2024-01-01 PROCEDURE — 86140 C-REACTIVE PROTEIN: CPT | Performed by: INTERNAL MEDICINE

## 2024-01-01 PROCEDURE — P9016 RBC LEUKOCYTES REDUCED: HCPCS

## 2024-01-01 PROCEDURE — 83605 ASSAY OF LACTIC ACID: CPT | Performed by: SURGERY

## 2024-01-01 PROCEDURE — 82140 ASSAY OF AMMONIA: CPT | Performed by: SURGERY

## 2024-01-01 PROCEDURE — 99223 1ST HOSP IP/OBS HIGH 75: CPT | Mod: 25 | Performed by: INTERNAL MEDICINE

## 2024-01-01 PROCEDURE — 272N000237 HC CARDIOHELP CIRCUIT

## 2024-01-01 PROCEDURE — 250N000011 HC RX IP 250 OP 636: Performed by: NURSE PRACTITIONER

## 2024-01-01 PROCEDURE — 99254 IP/OBS CNSLTJ NEW/EST MOD 60: CPT | Mod: GC | Performed by: INTERNAL MEDICINE

## 2024-01-01 PROCEDURE — 250N000011 HC RX IP 250 OP 636: Mod: JZ

## 2024-01-01 PROCEDURE — 76705 ECHO EXAM OF ABDOMEN: CPT | Mod: 26 | Performed by: RADIOLOGY

## 2024-01-01 PROCEDURE — 82550 ASSAY OF CK (CPK): CPT | Performed by: SURGERY

## 2024-01-01 PROCEDURE — 86923 COMPATIBILITY TEST ELECTRIC: CPT

## 2024-01-01 PROCEDURE — 999N000065 XR CHEST PORT 1 VIEW

## 2024-01-01 PROCEDURE — 90947 DIALYSIS REPEATED EVAL: CPT

## 2024-01-01 PROCEDURE — 84075 ASSAY ALKALINE PHOSPHATASE: CPT | Performed by: INTERNAL MEDICINE

## 2024-01-01 PROCEDURE — 999N000065 XR ABDOMEN PORT 1 VIEW

## 2024-01-01 PROCEDURE — 83735 ASSAY OF MAGNESIUM: CPT | Performed by: INTERNAL MEDICINE

## 2024-01-01 PROCEDURE — 99222 1ST HOSP IP/OBS MODERATE 55: CPT | Mod: 25 | Performed by: INTERNAL MEDICINE

## 2024-01-01 PROCEDURE — 86431 RHEUMATOID FACTOR QUANT: CPT | Performed by: INTERNAL MEDICINE

## 2024-01-01 PROCEDURE — 82330 ASSAY OF CALCIUM: CPT | Performed by: SURGERY

## 2024-01-01 PROCEDURE — 999N000157 HC STATISTIC RCP TIME EA 10 MIN

## 2024-01-01 PROCEDURE — 83690 ASSAY OF LIPASE: CPT | Performed by: SURGERY

## 2024-01-01 PROCEDURE — C9113 INJ PANTOPRAZOLE SODIUM, VIA: HCPCS | Mod: JZ

## 2024-01-01 PROCEDURE — 85652 RBC SED RATE AUTOMATED: CPT | Performed by: INTERNAL MEDICINE

## 2024-01-01 PROCEDURE — 82805 BLOOD GASES W/O2 SATURATION: CPT

## 2024-01-01 PROCEDURE — 74176 CT ABD & PELVIS W/O CONTRAST: CPT | Mod: 26 | Performed by: RADIOLOGY

## 2024-01-01 PROCEDURE — 250N000011 HC RX IP 250 OP 636: Mod: JZ | Performed by: SURGERY

## 2024-01-01 PROCEDURE — 94640 AIRWAY INHALATION TREATMENT: CPT

## 2024-01-01 PROCEDURE — 258N000003 HC RX IP 258 OP 636: Mod: JZ | Performed by: NURSE PRACTITIONER

## 2024-01-01 PROCEDURE — 85730 THROMBOPLASTIN TIME PARTIAL: CPT | Performed by: SURGERY

## 2024-01-01 PROCEDURE — 85347 COAGULATION TIME ACTIVATED: CPT

## 2024-01-01 PROCEDURE — 99221 1ST HOSP IP/OBS SF/LOW 40: CPT | Mod: 4UV | Performed by: STUDENT IN AN ORGANIZED HEALTH CARE EDUCATION/TRAINING PROGRAM

## 2024-01-01 PROCEDURE — 0BJ08ZZ INSPECTION OF TRACHEOBRONCHIAL TREE, VIA NATURAL OR ARTIFICIAL OPENING ENDOSCOPIC: ICD-10-PCS | Performed by: INTERNAL MEDICINE

## 2024-01-01 PROCEDURE — 82330 ASSAY OF CALCIUM: CPT | Performed by: INTERNAL MEDICINE

## 2024-01-01 PROCEDURE — 71275 CT ANGIOGRAPHY CHEST: CPT

## 2024-01-01 PROCEDURE — 33946 ECMO/ECLS INITIATION VENOUS: CPT

## 2024-01-01 PROCEDURE — 84100 ASSAY OF PHOSPHORUS: CPT | Performed by: INTERNAL MEDICINE

## 2024-01-01 PROCEDURE — 5A1935Z RESPIRATORY VENTILATION, LESS THAN 24 CONSECUTIVE HOURS: ICD-10-PCS | Performed by: SURGERY

## 2024-01-01 PROCEDURE — 86235 NUCLEAR ANTIGEN ANTIBODY: CPT

## 2024-01-01 PROCEDURE — 85396 CLOTTING ASSAY WHOLE BLOOD: CPT | Performed by: NURSE PRACTITIONER

## 2024-01-01 PROCEDURE — 87641 MR-STAPH DNA AMP PROBE: CPT | Performed by: NURSE PRACTITIONER

## 2024-01-01 PROCEDURE — 87449 NOS EACH ORGANISM AG IA: CPT | Performed by: NURSE PRACTITIONER

## 2024-01-01 PROCEDURE — 82247 BILIRUBIN TOTAL: CPT | Performed by: INTERNAL MEDICINE

## 2024-01-01 PROCEDURE — 258N000003 HC RX IP 258 OP 636: Mod: JZ | Performed by: SURGERY

## 2024-01-01 PROCEDURE — 84155 ASSAY OF PROTEIN SERUM: CPT | Performed by: INTERNAL MEDICINE

## 2024-01-01 PROCEDURE — 33948 ECMO/ECLS DAILY MGMT-VENOUS: CPT

## 2024-01-01 PROCEDURE — 82550 ASSAY OF CK (CPK): CPT | Performed by: INTERNAL MEDICINE

## 2024-01-01 PROCEDURE — 86036 ANCA SCREEN EACH ANTIBODY: CPT | Performed by: INTERNAL MEDICINE

## 2024-01-01 PROCEDURE — 87040 BLOOD CULTURE FOR BACTERIA: CPT | Performed by: SURGERY

## 2024-01-01 PROCEDURE — 74018 RADEX ABDOMEN 1 VIEW: CPT | Mod: 26 | Performed by: RADIOLOGY

## 2024-01-01 PROCEDURE — 258N000001 HC RX 258

## 2024-01-01 PROCEDURE — 94002 VENT MGMT INPAT INIT DAY: CPT

## 2024-01-01 PROCEDURE — 85396 CLOTTING ASSAY WHOLE BLOOD: CPT

## 2024-01-01 PROCEDURE — 71045 X-RAY EXAM CHEST 1 VIEW: CPT | Mod: 26 | Performed by: RADIOLOGY

## 2024-01-01 PROCEDURE — 71045 X-RAY EXAM CHEST 1 VIEW: CPT

## 2024-01-01 PROCEDURE — 999N000185 HC STATISTIC TRANSPORT TIME EA 15 MIN

## 2024-01-01 PROCEDURE — 85390 FIBRINOLYSINS SCREEN I&R: CPT | Mod: 26 | Performed by: PATHOLOGY

## 2024-01-01 PROCEDURE — 93306 TTE W/DOPPLER COMPLETE: CPT | Mod: 26 | Performed by: STUDENT IN AN ORGANIZED HEALTH CARE EDUCATION/TRAINING PROGRAM

## 2024-01-01 PROCEDURE — 84450 TRANSFERASE (AST) (SGOT): CPT | Performed by: INTERNAL MEDICINE

## 2024-01-01 PROCEDURE — 85384 FIBRINOGEN ACTIVITY: CPT | Performed by: SURGERY

## 2024-01-01 PROCEDURE — 85379 FIBRIN DEGRADATION QUANT: CPT | Performed by: SURGERY

## 2024-01-01 PROCEDURE — 87305 ASPERGILLUS AG IA: CPT | Performed by: NURSE PRACTITIONER

## 2024-01-01 PROCEDURE — 80048 BASIC METABOLIC PNL TOTAL CA: CPT | Performed by: SURGERY

## 2024-01-01 PROCEDURE — 82150 ASSAY OF AMYLASE: CPT | Performed by: SURGERY

## 2024-01-01 PROCEDURE — 85576 BLOOD PLATELET AGGREGATION: CPT

## 2024-01-01 PROCEDURE — 86923 COMPATIBILITY TEST ELECTRIC: CPT | Performed by: SURGERY

## 2024-01-01 PROCEDURE — 84145 PROCALCITONIN (PCT): CPT | Performed by: SURGERY

## 2024-01-01 PROCEDURE — 200N000002 HC R&B ICU UMMC

## 2024-01-01 PROCEDURE — 86900 BLOOD TYPING SEROLOGIC ABO: CPT | Performed by: SURGERY

## 2024-01-01 PROCEDURE — 5A1D90Z PERFORMANCE OF URINARY FILTRATION, CONTINUOUS, GREATER THAN 18 HOURS PER DAY: ICD-10-PCS | Performed by: SURGERY

## 2024-01-01 PROCEDURE — 5A1522H EXTRACORPOREAL OXYGENATION, MEMBRANE, PERIPHERAL VENO-VENOUS: ICD-10-PCS | Performed by: SURGERY

## 2024-01-01 PROCEDURE — 99291 CRITICAL CARE FIRST HOUR: CPT | Mod: GC | Performed by: INTERNAL MEDICINE

## 2024-01-01 PROCEDURE — 84100 ASSAY OF PHOSPHORUS: CPT | Performed by: SURGERY

## 2024-01-01 PROCEDURE — 85007 BL SMEAR W/DIFF WBC COUNT: CPT | Performed by: SURGERY

## 2024-01-01 PROCEDURE — 84484 ASSAY OF TROPONIN QUANT: CPT

## 2024-01-01 PROCEDURE — 71250 CT THORAX DX C-: CPT | Mod: 26 | Performed by: RADIOLOGY

## 2024-01-01 PROCEDURE — 76705 ECHO EXAM OF ABDOMEN: CPT

## 2024-01-01 PROCEDURE — 93005 ELECTROCARDIOGRAM TRACING: CPT

## 2024-01-01 PROCEDURE — 272N000220 HC BRONCHOSCOPE, DISPOSABLE

## 2024-01-01 PROCEDURE — 85730 THROMBOPLASTIN TIME PARTIAL: CPT | Performed by: INTERNAL MEDICINE

## 2024-01-01 RX ORDER — ACETAMINOPHEN 325 MG/10.15ML
650 LIQUID ORAL EVERY 4 HOURS PRN
Status: DISCONTINUED | OUTPATIENT
Start: 2024-01-01 | End: 2024-01-01

## 2024-01-01 RX ORDER — EZETIMIBE 10 MG/1
10 TABLET ORAL DAILY
Qty: 90 TABLET | Refills: 3 | Status: SHIPPED | OUTPATIENT
Start: 2024-01-01 | End: 2024-07-20

## 2024-01-01 RX ORDER — NICOTINE POLACRILEX 4 MG
15-30 LOZENGE BUCCAL
Status: DISCONTINUED | OUTPATIENT
Start: 2024-01-01 | End: 2024-01-01 | Stop reason: HOSPADM

## 2024-01-01 RX ORDER — NALOXONE HYDROCHLORIDE 0.4 MG/ML
0.4 INJECTION, SOLUTION INTRAMUSCULAR; INTRAVENOUS; SUBCUTANEOUS
Status: DISCONTINUED | OUTPATIENT
Start: 2024-01-01 | End: 2024-01-01 | Stop reason: HOSPADM

## 2024-01-01 RX ORDER — POTASSIUM CHLORIDE 29.8 MG/ML
20 INJECTION INTRAVENOUS EVERY 8 HOURS PRN
Status: DISCONTINUED | OUTPATIENT
Start: 2024-01-01 | End: 2024-01-01 | Stop reason: HOSPADM

## 2024-01-01 RX ORDER — ALBUTEROL SULFATE 0.83 MG/ML
2.5 SOLUTION RESPIRATORY (INHALATION) EVERY 8 HOURS PRN
Status: DISCONTINUED | OUTPATIENT
Start: 2024-01-01 | End: 2024-01-01 | Stop reason: HOSPADM

## 2024-01-01 RX ORDER — ACETAMINOPHEN 325 MG/1
650 TABLET ORAL EVERY 4 HOURS PRN
Status: DISCONTINUED | OUTPATIENT
Start: 2024-01-01 | End: 2024-01-01

## 2024-01-01 RX ORDER — CALCIUM CHLORIDE, MAGNESIUM CHLORIDE, SODIUM CHLORIDE, SODIUM BICARBONATE, POTASSIUM CHLORIDE AND SODIUM PHOSPHATE DIBASIC DIHYDRATE 3.68; 3.05; 6.34; 3.09; .314; .187 G/L; G/L; G/L; G/L; G/L; G/L
12.5 INJECTION INTRAVENOUS CONTINUOUS
Status: DISCONTINUED | OUTPATIENT
Start: 2024-01-01 | End: 2024-01-01 | Stop reason: HOSPADM

## 2024-01-01 RX ORDER — NOREPINEPHRINE BITARTRATE 0.06 MG/ML
.01-.6 INJECTION, SOLUTION INTRAVENOUS CONTINUOUS
Status: DISCONTINUED | OUTPATIENT
Start: 2024-01-01 | End: 2024-01-01

## 2024-01-01 RX ORDER — LIDOCAINE 40 MG/G
CREAM TOPICAL
Status: DISCONTINUED | OUTPATIENT
Start: 2024-01-01 | End: 2024-01-01 | Stop reason: HOSPADM

## 2024-01-01 RX ORDER — CALCIUM CHLORIDE, MAGNESIUM CHLORIDE, SODIUM CHLORIDE, SODIUM BICARBONATE, POTASSIUM CHLORIDE AND SODIUM PHOSPHATE DIBASIC DIHYDRATE 3.68; 3.05; 6.34; 3.09; .314; .187 G/L; G/L; G/L; G/L; G/L; G/L
INJECTION INTRAVENOUS CONTINUOUS
Status: DISCONTINUED | OUTPATIENT
Start: 2024-01-01 | End: 2024-01-01 | Stop reason: HOSPADM

## 2024-01-01 RX ORDER — METHYLPREDNISOLONE SODIUM SUCCINATE 125 MG/2ML
125 INJECTION, POWDER, LYOPHILIZED, FOR SOLUTION INTRAMUSCULAR; INTRAVENOUS ONCE
Status: COMPLETED | OUTPATIENT
Start: 2024-01-01 | End: 2024-01-01

## 2024-01-01 RX ORDER — PIPERACILLIN SODIUM, TAZOBACTAM SODIUM 4; .5 G/20ML; G/20ML
4.5 INJECTION, POWDER, LYOPHILIZED, FOR SOLUTION INTRAVENOUS EVERY 6 HOURS
Status: DISCONTINUED | OUTPATIENT
Start: 2024-01-01 | End: 2024-01-01 | Stop reason: HOSPADM

## 2024-01-01 RX ORDER — LOSARTAN POTASSIUM 100 MG/1
100 TABLET ORAL DAILY
Qty: 90 TABLET | Refills: 0 | Status: SHIPPED | OUTPATIENT
Start: 2024-01-01 | End: 2024-07-20

## 2024-01-01 RX ORDER — ACETAMINOPHEN 325 MG/1
650 TABLET ORAL EVERY 6 HOURS PRN
Status: DISCONTINUED | OUTPATIENT
Start: 2024-01-01 | End: 2024-01-01 | Stop reason: HOSPADM

## 2024-01-01 RX ORDER — HYDROCHLOROTHIAZIDE 50 MG/1
50 TABLET ORAL DAILY
Qty: 90 TABLET | Refills: 0 | Status: SHIPPED | OUTPATIENT
Start: 2024-01-01 | End: 2024-07-20

## 2024-01-01 RX ORDER — ATROPINE SULFATE 10 MG/ML
1 SOLUTION/ DROPS OPHTHALMIC
Status: DISCONTINUED | OUTPATIENT
Start: 2024-01-01 | End: 2024-01-01 | Stop reason: HOSPADM

## 2024-01-01 RX ORDER — POLYETHYLENE GLYCOL 3350 17 G/17G
17 POWDER, FOR SOLUTION ORAL DAILY PRN
Status: DISCONTINUED | OUTPATIENT
Start: 2024-01-01 | End: 2024-01-01 | Stop reason: HOSPADM

## 2024-01-01 RX ORDER — MIDAZOLAM HCL IN 0.9 % NACL/PF 1 MG/ML
1-8 PLASTIC BAG, INJECTION (ML) INTRAVENOUS CONTINUOUS
Status: DISCONTINUED | OUTPATIENT
Start: 2024-01-01 | End: 2024-01-01 | Stop reason: HOSPADM

## 2024-01-01 RX ORDER — ONDANSETRON 2 MG/ML
4 INJECTION INTRAMUSCULAR; INTRAVENOUS EVERY 6 HOURS PRN
Status: DISCONTINUED | OUTPATIENT
Start: 2024-01-01 | End: 2024-01-01

## 2024-01-01 RX ORDER — ONDANSETRON 4 MG/1
4 TABLET, ORALLY DISINTEGRATING ORAL EVERY 6 HOURS PRN
Status: DISCONTINUED | OUTPATIENT
Start: 2024-01-01 | End: 2024-01-01 | Stop reason: HOSPADM

## 2024-01-01 RX ORDER — DEXTROSE MONOHYDRATE 25 G/50ML
25-50 INJECTION, SOLUTION INTRAVENOUS
Status: DISCONTINUED | OUTPATIENT
Start: 2024-01-01 | End: 2024-01-01 | Stop reason: HOSPADM

## 2024-01-01 RX ORDER — GLYCOPYRROLATE 0.2 MG/ML
0.2 INJECTION, SOLUTION INTRAMUSCULAR; INTRAVENOUS ONCE
Status: COMPLETED | OUTPATIENT
Start: 2024-01-01 | End: 2024-01-01

## 2024-01-01 RX ORDER — HEPARIN SODIUM 10000 [USP'U]/100ML
10-50 INJECTION, SOLUTION INTRAVENOUS CONTINUOUS
Status: DISCONTINUED | OUTPATIENT
Start: 2024-01-01 | End: 2024-01-01

## 2024-01-01 RX ORDER — GLYCOPYRROLATE 0.2 MG/ML
0.1 INJECTION, SOLUTION INTRAMUSCULAR; INTRAVENOUS EVERY 4 HOURS PRN
Status: DISCONTINUED | OUTPATIENT
Start: 2024-01-01 | End: 2024-01-01 | Stop reason: HOSPADM

## 2024-01-01 RX ORDER — NALOXONE HYDROCHLORIDE 0.4 MG/ML
0.2 INJECTION, SOLUTION INTRAMUSCULAR; INTRAVENOUS; SUBCUTANEOUS
Status: DISCONTINUED | OUTPATIENT
Start: 2024-01-01 | End: 2024-01-01 | Stop reason: HOSPADM

## 2024-01-01 RX ORDER — ONDANSETRON 2 MG/ML
4 INJECTION INTRAMUSCULAR; INTRAVENOUS EVERY 6 HOURS PRN
Status: DISCONTINUED | OUTPATIENT
Start: 2024-01-01 | End: 2024-01-01 | Stop reason: HOSPADM

## 2024-01-01 RX ORDER — ONDANSETRON 4 MG/1
4 TABLET, ORALLY DISINTEGRATING ORAL EVERY 6 HOURS PRN
Status: DISCONTINUED | OUTPATIENT
Start: 2024-01-01 | End: 2024-01-01

## 2024-01-01 RX ORDER — FENTANYL CITRATE 50 UG/ML
25-50 INJECTION, SOLUTION INTRAMUSCULAR; INTRAVENOUS
Status: DISCONTINUED | OUTPATIENT
Start: 2024-01-01 | End: 2024-01-01 | Stop reason: HOSPADM

## 2024-01-01 RX ORDER — MAGNESIUM SULFATE HEPTAHYDRATE 40 MG/ML
2 INJECTION, SOLUTION INTRAVENOUS EVERY 8 HOURS PRN
Status: DISCONTINUED | OUTPATIENT
Start: 2024-01-01 | End: 2024-01-01 | Stop reason: HOSPADM

## 2024-01-01 RX ORDER — IOPAMIDOL 755 MG/ML
90 INJECTION, SOLUTION INTRAVASCULAR ONCE
Status: COMPLETED | OUTPATIENT
Start: 2024-01-01 | End: 2024-01-01

## 2024-01-01 RX ORDER — PROPOFOL 10 MG/ML
5-75 INJECTION, EMULSION INTRAVENOUS CONTINUOUS
Status: DISCONTINUED | OUTPATIENT
Start: 2024-01-01 | End: 2024-01-01 | Stop reason: HOSPADM

## 2024-01-01 RX ORDER — CALCIUM CHLORIDE, MAGNESIUM CHLORIDE, SODIUM CHLORIDE, SODIUM BICARBONATE, POTASSIUM CHLORIDE AND SODIUM PHOSPHATE DIBASIC DIHYDRATE 3.68; 3.05; 6.34; 3.09; .314; .187 G/L; G/L; G/L; G/L; G/L; G/L
12.5 INJECTION INTRAVENOUS CONTINUOUS
Status: DISCONTINUED | OUTPATIENT
Start: 2024-01-01 | End: 2024-01-01

## 2024-01-01 RX ORDER — CARBOXYMETHYLCELLULOSE SODIUM 5 MG/ML
1 SOLUTION/ DROPS OPHTHALMIC EVERY 8 HOURS PRN
Status: DISCONTINUED | OUTPATIENT
Start: 2024-01-01 | End: 2024-01-01 | Stop reason: HOSPADM

## 2024-01-01 RX ORDER — CALCIUM GLUCONATE 20 MG/ML
2 INJECTION, SOLUTION INTRAVENOUS EVERY 8 HOURS PRN
Status: DISCONTINUED | OUTPATIENT
Start: 2024-01-01 | End: 2024-01-01 | Stop reason: HOSPADM

## 2024-01-01 RX ORDER — CHLORHEXIDINE GLUCONATE ORAL RINSE 1.2 MG/ML
15 SOLUTION DENTAL EVERY 12 HOURS
Status: DISCONTINUED | OUTPATIENT
Start: 2024-01-01 | End: 2024-01-01 | Stop reason: HOSPADM

## 2024-01-01 RX ORDER — DOBUTAMINE HYDROCHLORIDE 200 MG/100ML
2.5-5 INJECTION INTRAVENOUS CONTINUOUS
Status: DISCONTINUED | OUTPATIENT
Start: 2024-01-01 | End: 2024-01-01

## 2024-01-01 RX ADMIN — CALCIUM CHLORIDE, MAGNESIUM CHLORIDE, SODIUM CHLORIDE, SODIUM BICARBONATE, POTASSIUM CHLORIDE AND SODIUM PHOSPHATE DIBASIC DIHYDRATE 12.5 ML/KG/HR: 3.68; 3.05; 6.34; 3.09; .314; .187 INJECTION INTRAVENOUS at 05:57

## 2024-01-01 RX ADMIN — PROPOFOL 50 MCG/KG/MIN: 10 INJECTION, EMULSION INTRAVENOUS at 23:51

## 2024-01-01 RX ADMIN — CALCIUM CHLORIDE, MAGNESIUM CHLORIDE, SODIUM CHLORIDE, SODIUM BICARBONATE, POTASSIUM CHLORIDE AND SODIUM PHOSPHATE DIBASIC DIHYDRATE 12.5 ML/KG/HR: 3.68; 3.05; 6.34; 3.09; .314; .187 INJECTION INTRAVENOUS at 09:27

## 2024-01-01 RX ADMIN — PROPOFOL 50 MCG/KG/MIN: 10 INJECTION, EMULSION INTRAVENOUS at 05:01

## 2024-01-01 RX ADMIN — PIPERACILLIN SODIUM AND TAZOBACTAM SODIUM 4.5 G: 4; .5 INJECTION, POWDER, LYOPHILIZED, FOR SOLUTION INTRAVENOUS at 09:06

## 2024-01-01 RX ADMIN — Medication 150 MCG: at 14:36

## 2024-01-01 RX ADMIN — CANGRELOR 0.75 MCG/KG/MIN: 50 INJECTION, POWDER, LYOPHILIZED, FOR SOLUTION INTRAVENOUS at 21:20

## 2024-01-01 RX ADMIN — Medication 50 MCG/HR: at 20:47

## 2024-01-01 RX ADMIN — IOPAMIDOL 90 ML: 755 INJECTION, SOLUTION INTRAVENOUS at 04:54

## 2024-01-01 RX ADMIN — PROPOFOL 60 MCG/KG/MIN: 10 INJECTION, EMULSION INTRAVENOUS at 13:11

## 2024-01-01 RX ADMIN — METHYLPREDNISOLONE SODIUM SUCCINATE 125 MG: 125 INJECTION, POWDER, FOR SOLUTION INTRAMUSCULAR; INTRAVENOUS at 14:28

## 2024-01-01 RX ADMIN — CANGRELOR 0.75 MCG/KG/MIN: 50 INJECTION, POWDER, LYOPHILIZED, FOR SOLUTION INTRAVENOUS at 05:41

## 2024-01-01 RX ADMIN — PROPOFOL 25 MCG/KG/MIN: 10 INJECTION, EMULSION INTRAVENOUS at 20:49

## 2024-01-01 RX ADMIN — SODIUM CHLORIDE 1 MG/MIN: 9 INJECTION, SOLUTION INTRAVENOUS at 21:23

## 2024-01-01 RX ADMIN — CHLORHEXIDINE GLUCONATE 15 ML: 1.2 RINSE ORAL at 09:11

## 2024-01-01 RX ADMIN — SODIUM CHLORIDE 3000 ML: 900 IRRIGANT IRRIGATION at 09:32

## 2024-01-01 RX ADMIN — PROPOFOL 50 MCG/KG/MIN: 10 INJECTION, EMULSION INTRAVENOUS at 02:26

## 2024-01-01 RX ADMIN — CALCIUM CHLORIDE, MAGNESIUM CHLORIDE, SODIUM CHLORIDE, SODIUM BICARBONATE, POTASSIUM CHLORIDE AND SODIUM PHOSPHATE DIBASIC DIHYDRATE 12.5 ML/KG/HR: 3.68; 3.05; 6.34; 3.09; .314; .187 INJECTION INTRAVENOUS at 00:57

## 2024-01-01 RX ADMIN — PROPOFOL 50 MCG/KG/MIN: 10 INJECTION, EMULSION INTRAVENOUS at 07:30

## 2024-01-01 RX ADMIN — GLYCOPYRROLATE 0.2 MG: 0.2 INJECTION INTRAMUSCULAR; INTRAVENOUS at 14:28

## 2024-01-01 RX ADMIN — MIDAZOLAM IN SODIUM CHLORIDE 4 MG/HR: 1 INJECTION INTRAVENOUS at 14:26

## 2024-01-01 RX ADMIN — CALCIUM CHLORIDE, MAGNESIUM CHLORIDE, SODIUM CHLORIDE, SODIUM BICARBONATE, POTASSIUM CHLORIDE AND SODIUM PHOSPHATE DIBASIC DIHYDRATE 12.5 ML/KG/HR: 3.68; 3.05; 6.34; 3.09; .314; .187 INJECTION INTRAVENOUS at 00:58

## 2024-01-01 RX ADMIN — Medication 150 MCG/HR: at 15:51

## 2024-01-01 RX ADMIN — TRANEXAMIC ACID 500 MG: 1 INJECTION, SOLUTION INTRAVENOUS at 05:30

## 2024-01-01 RX ADMIN — EPINEPHRINE 0.03 MCG/KG/MIN: 1 INJECTION INTRAMUSCULAR; INTRAVENOUS; SUBCUTANEOUS at 09:00

## 2024-01-01 RX ADMIN — CHLORHEXIDINE GLUCONATE 15 ML: 1.2 RINSE ORAL at 22:43

## 2024-01-01 RX ADMIN — PROPOFOL 60 MCG/KG/MIN: 10 INJECTION, EMULSION INTRAVENOUS at 10:04

## 2024-01-01 RX ADMIN — CALCIUM CHLORIDE, MAGNESIUM CHLORIDE, SODIUM CHLORIDE, SODIUM BICARBONATE, POTASSIUM CHLORIDE AND SODIUM PHOSPHATE DIBASIC DIHYDRATE: 3.68; 3.05; 6.34; 3.09; .314; .187 INJECTION INTRAVENOUS at 00:58

## 2024-01-01 RX ADMIN — CALCIUM CHLORIDE, MAGNESIUM CHLORIDE, SODIUM CHLORIDE, SODIUM BICARBONATE, POTASSIUM CHLORIDE AND SODIUM PHOSPHATE DIBASIC DIHYDRATE: 3.68; 3.05; 6.34; 3.09; .314; .187 INJECTION INTRAVENOUS at 09:27

## 2024-01-01 RX ADMIN — VANCOMYCIN HYDROCHLORIDE 2000 MG: 1 INJECTION, POWDER, LYOPHILIZED, FOR SOLUTION INTRAVENOUS at 10:12

## 2024-01-01 RX ADMIN — PANTOPRAZOLE SODIUM 40 MG: 40 INJECTION, POWDER, FOR SOLUTION INTRAVENOUS at 09:11

## 2024-01-01 ASSESSMENT — ACTIVITIES OF DAILY LIVING (ADL)
ADLS_ACUITY_SCORE: 57
ADLS_ACUITY_SCORE: 35
ADLS_ACUITY_SCORE: 57
ADLS_ACUITY_SCORE: 57
ADLS_ACUITY_SCORE: 35
ADLS_ACUITY_SCORE: 57
ADLS_ACUITY_SCORE: 35
ADLS_ACUITY_SCORE: 57
ADLS_ACUITY_SCORE: 35
ADLS_ACUITY_SCORE: 57
ADLS_ACUITY_SCORE: 57
ADLS_ACUITY_SCORE: 35
ADLS_ACUITY_SCORE: 57

## 2024-01-21 DIAGNOSIS — J31.0 CHRONIC RHINITIS: ICD-10-CM

## 2024-01-21 DIAGNOSIS — J30.2 SEASONAL ALLERGIC RHINITIS, UNSPECIFIED TRIGGER: ICD-10-CM

## 2024-01-21 DIAGNOSIS — J32.9 CHRONIC SINUSITIS, UNSPECIFIED LOCATION: ICD-10-CM

## 2024-01-22 RX ORDER — MONTELUKAST SODIUM 10 MG/1
10 TABLET ORAL
Qty: 30 TABLET | Refills: 2 | Status: SHIPPED | OUTPATIENT
Start: 2024-01-22

## 2024-06-15 PROBLEM — J96.00 ACUTE RESPIRATORY FAILURE (H): Status: ACTIVE | Noted: 2024-01-01

## 2024-06-15 PROBLEM — Z92.81 H/O EXTRACORPOREAL MEMBRANE OXYGENATION TREATMENT: Status: ACTIVE | Noted: 2024-01-01

## 2024-06-15 NOTE — H&P
ICU History and Physical    Primary Team: SICU ECMO  Reason for Critical Care Admission: VV ECMO  Admitting Physician: Pily Webster MD  Date of Admission:  6/15/2024      Assessment: Critical Care   Malvin Torres is a 64 year old male admitted on 6/15/2024. He was cannulated for VV ECMO at [facility] and transferred to [facility] ICU for management and further monitoring.      Past Medical History:  STEMI (inferoposterior infarct ) s/p EDISON in Circumflex artery  on 05/30/2024, came back due to recurrent chest pain with ST changes, he had second stent to be placed in the left PL branch. Then, he got complicated with stent thrombosis.. He was evaluated in ER due to recurrent chest pain and near syncope. Work-up included EKG showing inferior ST elevation with reciprocal changes.  CAD s/p stents  Hypertension  Depression  Obesity      Past Surgical History:  Right knee replacement      Past Social History:   . Alcohol and Tobacco abuse disorder    Allergies:  Ace inhibitors, Bupropion, Pcn [penicillins], and Spironolactone       Plan: Critical Care   Neuro/ pain/ sedation:  - Monitor neurological status. Notify provider for any acute changes in exam  - pain/sedation: propofol, fentanyl gtt    Pulmonary:  # DAH  # Acute hypoxic respiratory failure requiring mechanical ventilation  # VV ECMO  - Ventilator settings: Vent Mode: PCV Plus assist  (Pressure Control Ventilation/ Assist Control)  Resp Rate (Set): 18 breaths/min  Tidal Volume (Set, mL): 450 mL  PEEP (cm H2O): 10 cmH2O  Inspiratory Pressure (cm H2O) (Drager Beti): 25  - routine ECMO labs, prn ABGs  - VV ECMO flow ~4.5, sweep 6-9  - pulmonology consult per ECMO protocol, appreciate recs    Cardiovascular:  # Recent STEMI s/p EDISON  # Mitral regurgitation  # Shocked, mixed  - Levophed on admission, weaned down  - Goal MAP > 65  - Goal to decrease afterload with MR. Wean levophed as able, will add dobutamine for inotropic support if needed  -  Cardiology consulted, appreciate recs   -- start cangrelor gtt, hold aspirin   -- bedside echo by cardiology fellow. LVEF about 50%, moderate MR  - formal echo in AM    GI/Nutrition:  # ileus  - Diet:  NPO  - Tube feeds when appropriate  - OG to LIS for now  - nutrition consult    Renal/ Fluid Balance:   # Lactic acidosis  - Romero for strict I/O  - ICU electrolyte replacement protocols  - acute renal failure, continue CRRT  - trend lactate    Endocrine:  # Stress hyperglycemia  - FSG, ISS q4h, goal blood glucose < 180    ID:  # No acute issues  - monitor fever curve, WBC    Hematology:  - Goal Hgb > 8  - Monitor for increased bleeding with cangrelor gtt and heparin for ECMO circuit    MSK:   # deconditioning due to critical illness  - PT and OT consulted. Appreciate recommendations.     Lines/ tubes/ drains:  Romero Catheter: PRESENT, indication: ICU only: hourly urine output needed for patient care  Lines: CVC, arterial line, ECMO cannula x2 RIJ and R fem, OG, ETT, Romero    Prophylaxis:  - DVT Prophylaxis: heparin  - PUD Prophylaxis: protonix    Code Status:  Full    Disposition:  - SICU    The patient's care was discussed with the Attending Physician, Dr. Dia  and ECMO attending Dr. Webster    Clinically Significant Risk Factors Present on Admission            Carissa Felder MD  Surgery PGY3    Securely message with [employer] (more info)  Text page via [employer] Paging/Directory     ______________________________________________________________________    Chief Complaint   S/p VV ECMO    History obtained from chart review    History of Present Illness   Malvin Torres is a 64 year old male with hx of smoking, recent inferior STEMI (5/2024) s/p EDISON to Lcx c/b in stent thrombosis s/p repeat PCI after which he developed hemoptysis and SHAYNE, alveolar hemorrhage resulting in acute hypoxemix/hypercarbic respiratory failure requiring mechanical ventilation and ultimately VV ECMO for which he was transferred to [facility] on  "6/15.       Past Medical History    CAD  Tobacco use    Past Surgical History   I have reviewed this patient's surgical history and updated it with pertinent information if needed.  No past surgical history on file.    Social History   I have reviewed this patient's social history and updated it with pertinent information if needed.  Social History     Tobacco Use    Smoking status: Former     Current packs/day: 0.00     Types: Cigarettes     Quit date: 2023     Years since quittin.6     Passive exposure: Never    Smokeless tobacco: Never   Substance Use Topics    Alcohol use: Yes     Comment: daily    Drug use: No       Prior to Admission Medications   Cannot display prior to admission medications because the patient has not been admitted in this contact.     Allergies   Allergies   Allergen Reactions    Bupropion Hives     PN: LW Reaction: HIVES    Penicillins Nausea and Vomiting     PN: LW Reaction: Vomiting       Physical Exam   BP (!) 112/32   Pulse 82   Temp 97.7  F (36.5  C)   Resp 18   Ht 1.727 m (5' 7.99\")   Wt 124 kg (273 lb 5.9 oz)   SpO2 (!) 87%   BMI 41.58 kg/m      General: intubated, sedated  HEENT: ETT, OG secure in place. ECMO cannula secure right neck.   Neuro: sedated, not responsive  CV: regular rate  Pulm: intubated, mechanical ventilation, equal chest rise. No chugging on ECMO circuit.   Abd: soft, mildly distended  : Romero in place, very little UOP, gross hematuria  Extremities: wwp      Data   I reviewed all medications, new labs and imaging results over the last 24 hours.  Arterial Blood Gases   Recent Labs   Lab 24  0345 24  0213 24  0006 06/15/24  2234   PH 7.49* 7.54* 7.56* 7.56*   PCO2 44 39 36 35   PO2 44* 42* 39* 41*   HCO3 34* 33* 32* 32*       Complete Blood Count   Recent Labs   Lab 24  0345 06/15/24  2233 06/15/24  1944   WBC 17.2* 16.6* 15.6*   HGB 8.2* 7.3* 7.8*   PLT 84* 71* 46*       Basic Metabolic Panel  Recent Labs   Lab " 06/16/24  0356 06/16/24  0345 06/16/24  0005 06/15/24  2233 06/15/24  1955 06/15/24  1944   NA  --  143  --  143  --  140  140   POTASSIUM  --  4.3  --  4.4  --  4.9  4.9   CHLORIDE  --  102  --  100  --  98  98   CO2  --  29  --  27  --  24  24   BUN  --  85.4*  --  87.9*  --  80.9*  80.9*   CR  --  2.90*  --  3.13*  --  3.06*  3.06*   * 120* 146* 155*   < > 183*  183*    < > = values in this interval not displayed.       Liver Function Tests  Recent Labs   Lab 06/16/24  0345 06/15/24  1944   AST 1,521* 666*   * 897*   ALKPHOS 79 75   BILITOTAL 1.2 1.1   ALBUMIN 2.7* 2.8*   INR 1.43* 1.66*       Pancreatic Enzymes  Recent Labs   Lab 06/15/24  1944   LIPASE 105*   AMYLASE 242*       Coagulation Profile  Recent Labs   Lab 06/16/24  0345 06/15/24  1944   INR 1.43* 1.66*   PTT 27 177*       IMAGING:  Recent Results (from the past 24 hour(s))   XR Chest Port Special View Right    Narrative    EXAM: XR CHEST AP PORT      DATE: 6/15/2024 8:15 AM    COMPARISON: Rebecca 10, 2024.    CLINICAL DATA: Infiltrate.    ICD 10:  I21.3 ST elevation (STEMI) myocardial infarction of unspecified site R07.9 Chest pain, unspecified    VIEWS: An AP, portable, supine view of the chest was obtained.    FINDINGS: Endotracheal tube tip 7.8 cm above the chey. Enteric tube extends off the inferior aspect of the image. Cardiac leads. Right IJ central line appears unchanged. Diffuse alveolar opacification in both lungs. Left subclavian central line.    Impression    IMPRESSION:  1.  Largely confluent alveolar opacities throughout both lungs. Opacity is slightly more conspicuous superiorly in both lungs than previous. Edema, hemorrhage and pneumonia are all possible.    REPORT SIGNED BY DR. Prateek Doherty   CT Head w/o Contrast    Narrative    EXAM: CT HEAD W/O CONTRAST  6/15/2024 7:27 PM     HISTORY:  VV ECMO       COMPARISON:  None available.    TECHNIQUE: Using multidetector thin collimation dual-energy helical  acquisition  technique, axial, coronal and sagittal CT images from the  skull base to the vertex were obtained without intravenous contrast.   (topogram) image(s) also obtained and reviewed.    FINDINGS:  No acute intracranial hemorrhage, mass effect, or midline shift. No  acute loss of gray-white matter differentiation in the cerebral  hemispheres. Mild generalized parenchymal volume loss. Ventricles are  proportionate to the cerebral sulci. Clear basal cisterns.    The bony calvaria and the bones of the skull base are normal.  Scattered paranasal sinus mucosal thickening and near complete  opacification of the left sphenoid sinus including frothy debris,  nonspecific in the setting of intubation. Bilateral mastoid and middle  ear effusions. Grossly normal orbits.       Impression    IMPRESSION: No acute intracranial pathology.     CHARLES PERALES MD         SYSTEM ID:  [id number]   CT Chest Abdomen Pelvis w/o Contrast    Narrative    EXAM: CT CHEST ABDOMEN PELVIS W/O CONTRAST 6/15/2024 7:35 PM    HISTORY: 64 years Male s/p VV ECMO    COMPARISON: None.    TECHNIQUE: Helical CT images from the thoracic inlet through the  symphysis pubis were obtained without IV contrast.    FINDINGS:   image(s) are noncontributory.    LINES/TUBES: Endotracheal tube with tip above the chey. Right IJ  ECMO venous cannula a tip terminating adjacent to the brachiocephalic  confluence. Gastric tube tip terminates in the stomach. Left  subclavian vein line terminating adjacent to the brachiocephalic  confluence. Right femoral ECMO venous cannula tip terminates adjacent  to the inferior cavoatrial junction. Romero catheter seen within the  bladder.    CHEST:  LOWER NECK: Unremarkable thyroid. Temperature probe appears coiled in  the neck.    PULMONARY: Extensive layering debris seen within the central airways  and complete occlusion involving the segmental bronchi of the left  lower lobe.. Bronchial wall thickening, most pronounced in the  lower  lobes. Centrilobular and paraseptal emphysematous changes, most  pronounced in the upper lobes. Extensive multifocal consolidative and  patchy groundglass opacities throughout the right and left hemithorax,  most pronounced within the left upper and left lower lobe. There is  near complete collapse/consolidation involving the left lower lobe. No  significant pleural effusions. No appreciable pneumothorax.    CARDIAC: Heart size is borderline enlarged. No pericardial effusion.  Extensive coronary artery calcification/coronary stent.    MEDIASTINUM: The ascending thoracic aorta and main pulmonary artery  are normal in caliber. Common origin of the brachiocephalic artery and  left common carotid artery, normal anatomical variant. There are a few  scattered prominent and right axillary lymph nodes without definite  suspicious features.    ESOPHAGUS: Unremarkable.    ABDOMEN/PELVIS:  HEPATIC: No suspicious focal hepatic lesion.    BILIARY: Gallbladder is moderately distended with hyperdense material  seen within the gallbladder lumen, presumed to represent sludge versus  excreted contrast material. No intra or extra hepatic biliary ductal  dilation.    PANCREAS: Fatty chart for changes of the pancreas. No focal pancreatic  lesion. No pancreatic ductal dilation.    SPLEEN: No splenomegaly. No suspicious lesions or masses.    ADRENALS: Unremarkable.    RENAL: No hydronephrosis. No renal calculi. No definite suspicious  renal masses, however limited on this noncontrast examination..    URINARY BLADDER: Mildly distended with Romero catheter in place.  Hyperdense layering material seen within the bladder lumen.    REPRODUCTIVE: Unremarkable.    GASTROINTESTINAL: Stomach is well distended with ingested material.  Normal caliber small and large bowel. No abnormal wall thickening or  enhancement. Retained contrast material seen within the large bowel  colonic diverticulosis without CT evidence of diverticulitis.  Appendix  is unremarkable.    MESENTERY/PERITONEUM: Trace amount of simple appearing free fluid. No  pneumoperitoneum..    VASCULAR: Mild vascular calcifications abdominal aorta. Vascular  patency cannot be assessed on this noncontrast examination..    LYMPH NODES: No lymphadenopathy.    MUSCULOSKELETAL: Multilevel degenerative changes of the spine. No  acute or suspicious osseous abnormality. Body wall anasarca. Small  fat-containing umbilical hernia.      Impression    IMPRESSION:   1.  Stable position of support devices, as described in body the  report.   2.  Extensive multifocal consolidative and patchy groundglass  opacities throughout the right left hemithorax with complete  collapse/consolidation involving the left lower lobe, can be seen in  the setting of ARDS and/or infection. This also likely represents a  component of aspiration as there is extensive layering debris seen  within the central airways with complete occlusion involving the  segmental bronchi of the left lower lobe.  3.  Layering hyperdense material seen within the bladder lumen, may  represent sequelae of excreted contrast, however hemorrhagic blood  products cannot be excluded. If there is clinical concern, consider  correlation with urinalysis.   4.  Trace amount of simple appearing free fluid in the abdomen and  body wall anasarca, consistent with fluid overload  5.  Temperature probe appears coiled in the neck.    I have personally reviewed the examination and initial interpretation  and I agree with the findings.    EMANUEL CRUZ MD         SYSTEM ID:  [id number]   XR Chest Port 1 View    Narrative    EXAM: XR CHEST PORT 1 VIEW  6/15/2024 8:48 PM     HISTORY:  VV ECMO cannulated       COMPARISON:  6/15/2024    FINDINGS:     AP portable spine radiograph of the chest. The esophageal temperature  probe is likely coiling within the cervical esophagus. Right IJ ECMO  venous cannula tip is in the brachiocephalic vein. Additional  left  subclavian line projects near the brachycephalic confluence. Gastric  tube seen coursing below the diaphragm and out of the field-of-view.  Endotracheal tube seen at the mid thoracic trachea.     Trachea is midline. The cardiac silhouette is completely obscured.  Extensive multifocal patchy consolidative opacities throughout the  right left hemithorax with near complete opacification involving the  left lung field. No appreciable pneumothorax.    No acute osseous abnormality. Visualized upper abdomen is  unremarkable.        Impression    IMPRESSION:   1. Esophageal temperature probe likely coiling within the upper  cervical esophagus, consider repositioning. The remainder of support  devices are in stable position, better appreciated on same-day CT  6/15/2024.  2. Extensive multifocal patchy and consolidative opacities throughout  the right and left hemithorax, left greater than right.    I have personally reviewed the examination and initial interpretation  and I agree with the findings.    EMANUEL CRUZ MD         SYSTEM ID:  [id number]   XR Abdomen Port 1 View    Narrative    EXAM: XR ABDOMEN PORT 1 VIEW  6/15/2024 8:50 PM     HISTORY:  Verify OG placement       TECHNIQUE: Single frontal radiograph of the abdomen    COMPARISON:  CT 6/15/2024    FINDINGS:   AP portable supine radiograph of the abdomen. Gastric tube sidehole  and tip projected over the stomach. Right inferior approach ECMO  cannula tip projects near the inferior cavoatrial junction.    Nonobstructive bowel gas pattern without pneumatosis or portal venous  gas within the field of view.      Impression    IMPRESSION:   Gastric tube sidehole and tip project over the stomach.    I have personally reviewed the examination and initial interpretation  and I agree with the findings.    EMANUEL CRUZ MD         SYSTEM ID:  [id number]   CTA Chest with Contrast    Impression    RESIDENT PRELIMINARY INTERPRETATION  IMPRESSION:  1. Pulmonary emboli  involving the right interlobar artery extending  into the right middle and lower lobar arteries as well as the  segmental branches to the right upper lobe. No pulmonary emboli on the  left.  2. No evidence of bronchial artery active extravasation.  3. Stable extensive, multifocal consolidative and groundglass  opacities throughout consistent with ARDS.      [Urgent Result: Pulmonary emboli]    Finding was identified on 6/16/2024 5:18 AM.     Dr. Fleder was contacted by Dr. Myers via [facility] at 6/16/2024 5:25  AM and indicated understanding of the urgent finding.

## 2024-06-16 NOTE — PROGRESS NOTES
CRRT INITIATION NOTE    Consent for CRRT Completed:  YES - consent received at OSH  Patient s Vascular Access: ECMO            Placement Confirmed: YES  Manufacture:  NA  Model:  NA  Length/Djiboutian Size:  NA  Flush Volume:  NA    DATA:  Procedure:  CRRT  Start Time:  0122   Machine#:  5  Filter:    Blood Flow:  200  ML/min  Pre-Replacement Solution:  Phoxillum BK4/25  Post-Replacement Solution:  Phoxillum BK4/25  Dialysate Solution:  Phoxillum BK4/25  Pre-Replacement Solution Rate:  1600 mL/hr  Post-Replacement Solution Rate:  200 mL/hr  Dialysate Flow Rate:  1600 mL/hr   Patient Removal Rate:  0 mL/hr  Anticoagulation Type and Rate:  none    ASSESSMENT:  How Patient Tolerated Initiation:   Vital Signs:  97.3 temp, HR 78, RR 18, /57 (76)  Initial Pressures:  Access:  63  Filter:  111  Return:  56  TMP:  27  Change in Filter Pressure:  30      INTERVENTIONS:  Pt tolerated CRRT start well    PLAN:  Pull I=O on CRRT as pt tolerants

## 2024-06-16 NOTE — PROGRESS NOTES
Care Management Follow Up    Length of Stay (days): 1    Expected Discharge Date: 06/21/2024     Concerns to be Addressed:         Spoke with bedside RN and she reported that family has been called to the hospital so the medical team can provide a medical update. Spoke with sister Moustapha at bedside and she said that she had called all family members in town. They have been keeping the [place] - patient is a  and has a large [employer] family. Offered  at [facility] - sister shared that [facility]  came last night and patient's wife seemed concerned. Family on their way and will meet with medical team after their arrival.    Offered support and will remain available.     Laura Ingram [facility] MSW  6/16/2024       Social Work and Care Management Department       SEARCHABLE in [facility] - search SOCIAL WORK       [place] (0800 - 1630) Saturday and Sunday     Units: 4A Vocera, 4C Vocera, & 4E Vocera        Units: 5A [id number] Vocera, 5A 9739-1554 Vocera , BMT SW 1 BMT SW 2, BMT SW 3 & BMT SW 4  5C Off Service 5401 - 5416  5C Off Service [phone]     Units: 6A Vocera & 6B Vocera      Units: 6C Vocera     Units: 7A Vocera & 7B Vocera      Units: 7C Med Surg 7401 thru 7418 and 7C Med Surg 7502 thru 7521      Unit: [place] ED Vocera & [place] Obs Vocera     [facility] ([id number]) Saturday and Sunday      Units: 5 Ortho Vocera, 5 Med Surg Vocera & WB ED Vocera     Units: 6 Med Surg Vocera, 8 Med Surg Vocera, & 10 ICU Vocera      After hours Vocera [facility] and After Hours Vocera [place]     Saturday & Sunday (1630 - 0000)    Mon-Fri ([id number])     [facility] Recognized Holidays  ([id number])    Units: ALL   - see above VOCERA links to units and after hours

## 2024-06-16 NOTE — H&P
ECMO Admit H&P  Admitting: Dr. Webster    Assessment: Critical Care   Malvin Torres is a 64 year old male admitted on 6/15/2024. He was cannulated for VV ECMO at [facility] and transferred to [facility] ICU for management and further monitoring.     Past Medical History:  STEMI (inferoposterior infarct ) s/p EDISON in Circumflex artery  on 05/30/2024, came back due to recurrent chest pain with ST changes, he had second stent to be placed in the left PL branch. Then, he got complicated with stent thrombosis.. He was evaluated in ER due to recurrent chest pain and near syncope. Work-up included EKG showing inferior ST elevation with reciprocal changes.  CAD s/p stents  Hypertension  Depression  Obesity     Past Surgical History:  Right knee replacement     Past Social History:   . Alcohol and Tobacco abuse disorder     Allergies:  Ace inhibitors, Bupropion, Pcn [penicillins], and Spironolactone         Plan: Critical Care   Neuro/ pain/ sedation:  - Monitor neurological status. Notify provider for any acute changes in exam  - pain/sedation: propofol, fentanyl gtt     Pulmonary:  # DAH  # Acute hypoxic respiratory failure requiring mechanical ventilation  # VV ECMO  - Ventilator settings: Vent Mode: PCV Plus assist  (Pressure Control Ventilation/ Assist Control)  Resp Rate (Set): 18 breaths/min  Tidal Volume (Set, mL): 450 mL  PEEP (cm H2O): 10 cmH2O  Inspiratory Pressure (cm H2O) (Drager Beti): 25  - routine ECMO labs, prn ABGs  - VV ECMO flow ~4.5, sweep 6-9  - pulmonology consult per ECMO protocol, appreciate recs     Cardiovascular:  # Recent STEMI s/p EDISON  # Mitral regurgitation  # Shocked, mixed  - Levophed on admission, weaned down  - Goal MAP > 65  - Goal to decrease afterload with MR. Wean levophed as able, will add dobutamine for inotropic support if needed  - Cardiology consulted, appreciate recs              -- start cangrelor gtt, hold aspirin              -- bedside echo by cardiology fellow. LVEF  about 50%, moderate MR  - formal echo in AM     GI/Nutrition:  # ileus  - Diet:  NPO  - Tube feeds when appropriate  - OG to LIS for now  - nutrition consult     Renal/ Fluid Balance:   # Lactic acidosis  - Romero for strict I/O  - ICU electrolyte replacement protocols  - acute renal failure, continue CRRT  - trend lactate     Endocrine:  # Stress hyperglycemia  - FSG, ISS q4h, goal blood glucose < 180     ID:  # No acute issues  - monitor fever curve, WBC     Hematology:  - Goal Hgb > 8  - Monitor for increased bleeding with cangrelor gtt and heparin for ECMO circuit     MSK:   # deconditioning due to critical illness  - PT and OT consulted. Appreciate recommendations.  Lines/ tubes/ drains:  Romero Catheter: PRESENT, indication: ICU only: hourly urine output needed for patient care  Lines: CVC, arterial line, ECMO cannula x2 RIJ and R fem, OG, ETT, Romero     Prophylaxis:  - DVT Prophylaxis: heparin  - PUD Prophylaxis: protonix     Code Status:  Full     Disposition:  - SICU     Chief Complaint  S/p VV ECMO     History obtained from chart review        History of Present Illness  Malvin Torres is a 64 year old male with hx of smoking, recent inferior STEMI (5/2024) s/p EDISON to Lcx c/b in stent thrombosis s/p repeat PCI after which he developed hemoptysis and SHAYNE, alveolar hemorrhage resulting in acute hypoxemix/hypercarbic respiratory failure requiring mechanical ventilation and ultimately VV ECMO for which he was transferred to [facility] on 6/15.       Past Medical History  CAD  Tobacco use      Past Surgical History  I have reviewed this patient's surgical history and updated it with pertinent information if needed.  No past surgical history on file.     Social History  I have reviewed this patient's social history and updated it with pertinent information if needed.  Social History            Tobacco Use    Smoking status: Former       Current packs/day: 0.00       Types: Cigarettes       Quit date: 11/1/2023        "Years since quittin.6       Passive exposure: Never    Smokeless tobacco: Never   Substance Use Topics    Alcohol use: Yes       Comment: daily    Drug use: No         Prior to Admission Medications  Cannot display prior to admission medications because the patient has not been admitted in this contact.            Allergies  Allergies         Allergies   Allergen Reactions    Bupropion Hives       PN: LW Reaction: HIVES    Penicillins Nausea and Vomiting       PN: LW Reaction: Vomiting             Physical Exam  BP (!) 112/32   Pulse 82   Temp 97.7  F (36.5  C)   Resp 18   Ht 1.727 m (5' 7.99\")   Wt 124 kg (273 lb 5.9 oz)   SpO2 (!) 87%   BMI 41.58 kg/m       General: intubated, sedated  HEENT: ETT, OG secure in place. ECMO cannula secure right neck.   Neuro: sedated, not responsive  CV: regular rate  Pulm: intubated, mechanical ventilation, equal chest rise. No chugging on ECMO circuit.   Abd: soft, mildly distended  : Romero in place, very little UOP, gross hematuria  Extremities: wwp       VV ECMO Initiation  Pily Webster MD FACS  Professor of Surgery  Pager [phone]       "

## 2024-06-16 NOTE — CONSULTS
[facility]    Consult Note - Thoracic Surgery Service  Date of Admission:  6/15/2024  Consult Requested by: Dr. Gloria PALACIOS   Reason for Consult: Consideration of rigid bronchoscopy     Assessment & Plan: Surgery   Malvin Torres is a 64 year old male w/ PMHx significant for smoking, recent inferior wall STEMI (5/2024) s/p EDISON to Lcx c/b in stent thrombosis requiring repeat PCI after which he developed hemoptysis and SHAYNE, alveolar hemorrhage resulting in acute hypoxemix/hypercarbic respiratory failure requiring mechanical ventilation and ultimately VV ECMO for which he was transferred to [facility] on 6/15. Thoracic surgery was consulted for possible rigid bronchoscopy for management of diffuse alveolar hemorrhage. Patient is critically ill with evidence of diffuse endo organ dysfunction.     - No plans for intervention, do not recommend rigid bronchoscopy at this time   - Could consider repeat flexible bronchoscopy   - recommend supportive cares     The patient's care was discussed with the  fellow Dr. Dunn who discussed with staff Dr. Villatoro .    Connor Fernandez MD  [facility]  Non-urgent messages: Securely message with [employer] (more info)  Text page via [employer] Paging/Directory     ______________________________________________________________________    Chief Complaint   Diffuse alveolar hemorrhage     History is obtained from chart review.     History of Present Illness   Malvin Torres is a 64 year old male w/ PMHx significant for smoking, recent inferior wall STEMI (5/2024) s/p EDISON to Lcx c/b in stent thrombosis requiring repeat PCI after which he developed hemoptysis and SHAYNE, alveolar hemorrhage resulting in acute hypoxemix/hypercarbic respiratory failure requiring mechanical ventilation and ultimately VV ECMO for which he was transferred to [facility] on 6/15.     SICU team requested urgent consultation for consideration  of rigid bronchoscopy for management of alveolar hemorrhage in setting of worsening oxygenation on VV ECMO and CT imaging notable for pulmonary emboli involving the right interlobar artery extending into right middle and lower lobar arteries as well as evidence of extensive multifocal consolidative opacities within both lungs concerning for ARDS vs diffuse alveolar hemorrhage.     Patient is currently on cangrelor gtt for recently placed EDISON.    Current ECMO settings include flows of 4.6 LPM, Sweep 6. SpO2 82% in the room on my assessment. Hgb 9.8.     Other pertinent labs include Cr 2.63, currently on CRRT. Lactate 1.7 (from 4). Trop 1358, WBC 23, plt 80. INR.  LFTs notable for shock liver with , AST 1521, D bili 0.74. T bili 1.2, CRP 10, BNP 8786.     Past Medical History    CAD  STEMI s/p PCI      Past Surgical History   No past surgical history on file.    Prior to Admission Medications   Current Facility-Administered Medications   Medication Dose Route Frequency Provider Last Rate Last Admin    albuterol (PROVENTIL) neb solution 2.5 mg  2.5 mg Nebulization Q8H PRN Miguel Dia MD        calcium gluconate 2 g in  mL intermittent infusion  2 g Intravenous Q8H PRN Ellen Fletcher MD        calcium gluconate 4 g in sodium chloride 0.9 % 100 mL intermittent infusion  4 g Intravenous Q8H PRN Ellen Fletcher MD        cangrelor (KENGREAL) 50 mg in sodium chloride 0.9 % 250 mL infusion  0.75 mcg/kg/min (Dosing Weight) Intravenous Continuous Carisas Felder MD   Stopped at 06/16/24 1000    chlorhexidine (PERIDEX) 0.12 % solution 15 mL  15 mL Mouth/Throat Q12H Carissa Felder MD   15 mL at 06/16/24 0911    glucose gel 15-30 g  15-30 g Oral Q15 Min PRN Faviola Crain MD        Or    dextrose 50 % injection 25-50 mL  25-50 mL Intravenous Q15 Min PRN Faviola Crain MD        Or    glucagon injection 1 mg  1 mg Subcutaneous Q15 Min PRN Faviola Crain MD        dialysate  for CVVHD & CVVHDF (Phoxillum BK4/2.5)  12.5 mL/kg/hr CRRT Continuous Ellen Fletcher MD 1,600 mL/hr at 06/16/24 0927 12.5 mL/kg/hr at 06/16/24 0927    EPINEPHrine (ADRENALIN) 16 mg in sodium chloride 0.9 % 250 mL infusion CENTRAL  0.01-0.06 mcg/kg/min (Dosing Weight) Intravenous Continuous de La MaterQing, CNP 1.2 mL/hr at 06/16/24 1000 0.01 mcg/kg/min at 06/16/24 1000    fentaNYL (PF) (SUBLIMAZE) injection 25-50 mcg  25-50 mcg Intravenous Q1H PRN Faviola Crain MD        fentaNYL (SUBLIMAZE) infusion   mcg/hr Intravenous Continuous Carissa Felder MD 3 mL/hr at 06/16/24 1000 150 mcg/hr at 06/16/24 1000    magnesium sulfate 2 g in 50 mL sterile water intermittent infusion  2 g Intravenous Q8H PRN Ellen Fletcher MD        naloxone (NARCAN) injection 0.2 mg  0.2 mg Intravenous Q2 Min PRN Pily Webster MD        Or    naloxone (NARCAN) injection 0.4 mg  0.4 mg Intravenous Q2 Min PRN Pily Webster MD        Or    naloxone (NARCAN) injection 0.2 mg  0.2 mg Intramuscular Q2 Min PRN Pily Webster MD        Or    naloxone (NARCAN) injection 0.4 mg  0.4 mg Intramuscular Q2 Min PRN Pily Webster MD        No heparin required   Does not apply Continuous PRN Ellen Fletcher MD        ondansetron (ZOFRAN ODT) ODT tab 4 mg  4 mg Oral Q6H PRN Faviola Crain MD        Or    ondansetron (ZOFRAN) injection 4 mg  4 mg Intravenous Q6H PRN Faviola Crain MD        pantoprazole (PROTONIX) 2 mg/mL suspension 40 mg  40 mg Per Feeding Tube QAM AC Carissa Felder MD        Or    pantoprazole (PROTONIX) IV push injection 40 mg  40 mg Intravenous QAM Carissa Bello MD   40 mg at 06/16/24 0911    Patient is already receiving anticoagulation with heparin, enoxaparin (LOVENOX), warfarin (COUMADIN)  or other anticoagulant medication   Does not apply Continuous PRN Faviola Crain MD         piperacillin-tazobactam (ZOSYN) 4.5 g vial to attach to  mL bag  4.5 g Intravenous Q6H Teresa Castro MD   4.5 g at 06/16/24 0906    polyethylene glycol (MIRALAX) Packet 17 g  17 g Oral Daily PRN Faviola Crain MD        POST-filter replacement solution for CVVHD & CVVHDF (Phoxillum BK4/2.5)   CRRT Continuous Ellen Fletcher  mL/hr at 06/16/24 0927 New Bag at 06/16/24 0927    potassium chloride 20 mEq in 50 mL intermittent infusion  20 mEq Intravenous Q8H PRN Ellen Fletcher MD        PRE-filter replacement solution for CVVHD & CVVHDF (Phoxillum BK4/2.5)  12.5 mL/kg/hr CRRT Continuous Ellen Fletcher MD 1,600 mL/hr at 06/16/24 0557 12.5 mL/kg/hr at 06/16/24 0557    propofol (DIPRIVAN) infusion  5-75 mcg/kg/min (Dosing Weight) Intravenous Continuous Carissa Felder MD 44.6 mL/hr at 06/16/24 1004 60 mcg/kg/min at 06/16/24 1004    sennosides (SENOKOT) syrup 10 mL  10 mL Oral or Feeding Tube BID Qing Silveira CNP        sodium chloride 0.9% irrigation (bag)   Irrigation Continuous Teresa Castro MD   3,000 mL at 06/16/24 0932    sodium phosphate 15 mmol in sodium chloride 0.9 % 250 mL intermittent infusion  15 mmol Intravenous Q8H PRN Ellen Fletcher MD        tranexamic acid (CYKLOKAPRON) 100 mg/mL inhalation solution 500 mg  500 mg Nebulization Q6H Qing Silveira CNP        vancomycin (VANCOCIN) 2,000 mg in sodium chloride 0.9 % 250 mL intermittent infusion  2,000 mg (central catheter) Intravenous Once Pily Webster MD   2,000 mg at 06/16/24 1012    Followed by    [START ON 6/17/2024] vancomycin (VANCOCIN) 1,750 mg in sodium chloride 0.9 % 250 mL intermittent infusion  1,750 mg (central catheter) Intravenous Q24H Pily Webster MD        vasopressin 1 unit/mL MAX Conc (PITRESSIN) infusion  0.5-4 Units/hr Intravenous Qing Figueroa CNP   Held at 06/16/24 5806          Review of Systems     Review of systems not obtained due to patient factors - critical condition, intubation, and sedation     Physical Exam   Vital Signs: Temp: 97.9  F (36.6  C) Temp src: Esophageal BP: (!) 112/32 Pulse: 78   Resp: 20 SpO2: (!) 84 % O2 Device: Mechanical Ventilator    Weight: 273 lbs 5.93 oz  Intake/Output Summary (Last 24 hours) at 6/16/2024 0917  Last data filed at 6/16/2024 0800  Gross per 24 hour   Intake 1688.9 ml   Output 1095.3 ml   Net 593.6 ml     Constitutional: intubated and sedated, critically ill. RIJ cannula site with oozing blood and saturated dressing.   Respiratory: nonlabored breathing; coarse lung sounds with minimal appreciable airflow. On VV ECMO   Cardiovascular: RR, on pressors   GI: abdomen soft, mildly distended   Skin: oozing noted from cannulation sites. normal skin color, texture, turgor  Musculoskeletal: trace edema in BLE, WWP   Neurologic: intubated and sedated           Data   Most Recent 3 CBC's:  Recent Labs   Lab Test 06/16/24  0925 06/16/24  0345 06/15/24  2233   WBC 23.0* 17.2* 16.6*   HGB 9.4* 8.2* 7.3*   MCV 85 88 87   PLT 80* 84* 71*     Most Recent 3 BMP's:  Recent Labs   Lab Test 06/16/24  0943 06/16/24  0925 06/16/24  0356 06/16/24  0345 06/16/24  0005 06/15/24  2233   NA  --  141  140  --  143  --  143   POTASSIUM  --  4.6  4.6  --  4.3  --  4.4   CHLORIDE  --  102  102  --  102  --  100   CO2  --  27  27  --  29  --  27   BUN  --  80.6*  81.1*  --  85.4*  --  87.9*   CR  --  2.63*  2.64*  --  2.90*  --  3.13*   ANIONGAP  --  12  11  --  12  --  16*   PINKY  --  8.2*  8.2*  --  8.7*  --  9.3   * 129*  129* 120* 120*   < > 155*    < > = values in this interval not displayed.     Most Recent 2 LFT's:  Recent Labs   Lab Test 06/16/24  0345 06/15/24  1944   AST 1,521* 666*   * 897*   ALKPHOS 79 75   BILITOTAL 1.2 1.1     Most Recent 3 INR's:  Recent Labs   Lab Test 06/16/24  0737 06/16/24  0345 06/15/24  1944   INR 1.40* 1.43* 1.66*     Most Recent 3  Troponin's:No lab results found.  Most Recent 3 BNP's:  Recent Labs   Lab Test 06/16/24  0345   NTBNPI 8,786*     Most Recent ESR & CRP:  Recent Labs   Lab Test 06/16/24  0345   SED 20   CRPI 10.10*

## 2024-06-16 NOTE — PROGRESS NOTES
Nephrology Initial Consult  June 16, 2024      Malvin Torres MRN:[id number] YOB: 1960  Date of Admission:6/15/2024  Primary care provider: [facility]  Requesting physician: Pily Webster*    ASSESSMENT AND RECOMMENDATIONS:   Malvin Torres is a 64 year old male with PMH of HTN, HLP, CAD and obesity that is currently admitted to the CVICU due to diffuse alveolar hemorrhage on VVECMO. Nephrology is consulted for SHAYNE management.     Anuric SHAYNE 2/2 to cardiogenic shock  Patient was recently hospitalized from 05/28-5/30 due to STEMI - PCI - EDISON pLCx (remaining of the angiogram  of the RCA and 50-70% stenosis of Mid Lcx). Th same day that he had the PCI, he had recurrence of chest pain, required repeated angiogram, finding 70% stenosis of mid Lcx (where it was read as 50-70% before) and additional EDISON was left in. He was discharged on 05/30, got readmitted on 05/31 due to chest pain, another angiogram was done and stent thrombosis was found - EDISON placed on Lcx to LATOYA.   Baseline cr is unclear. Etiology of renal failure is multifactrorial; hemodynamic instability and multiple repeated contrast studies. Course of hospitalization was complicated DAH.   He was on CRRT in OSH before transferring to [facility]. CRRT is resumed upon arrival in [facility].     ABD US  The right kidney measures 10.3 cm. There is no significant urinary  tract dilation. The urinary bladder is well-distended and normal in  morphology. Heterogeneous debris including an intraluminal nodular  structure measuring 5.4 x 5.2 x 6.3 cm, likely clot.     6/11 Negative: MPO, PR3, GBM, dsDNA, complement normal, .   6/1 positive: SSA, SSB, ERIN    CRRT   Access: [facility]-ACMO   Blood Flow Rate: 200 ml/min  Net Fluid Removal Goal: 0-100 ml/hr as tolerated to keep MAP > 65  Prescription -  Dialysate: 12.5 ml/kg/hr with K4 bath, Pre: 12.5 ml/kg/hr with K4 bath, Post: 200 ml/hr with K4 bath      Recommendations were  communicated to primary team via verbal/note    Seen and discussed with Dr. Arianna Fletcher MD   Division of Renal Disease and Hypertension  [facility]  [email or web address]  Vocera Web Console      REASON FOR CONSULT: SHAYNE-D    HISTORY OF PRESENT ILLNESS:  Admitting provider and nursing notes reviewed  Malvin Torres is a 64 year old male with PMH of HTN, HLP, CAD and obesity that is currently admitted to the CVICU due to diffuse alveolar hemorrhage on VVECMO. Nephrology is consulted for SHAYNE management.      Patient was recently hospitalized from 05/28-5/30 due to STEMI - PCI - EDISON pLCx (remaining of the angiogram  of the RCA and 50-70% stenosis of Mid Lcx). Th same day that he had the PCI, he had recurrence of chest pain, required repeated angiogram, finding 70% stenosis of mid Lcx (where it was read as 50-70% before) and additional EDISON was left in. He was discharged on 05/30, got readmitted on 05/31 due to chest pain, another angiogram was done and stent thrombosis was found - EDISON placed on Lcx to LATOYA. After that, patient has had multiple complications such as alveolar hemorrhage, ileus and renal failure needing CRRT. His hospitalization is further complicated by PE and acute intracranial hemorrhage.       PAST MEDICAL HISTORY:  Reviewed with patient on 06/16/2024     No past medical history on file.    No past surgical history on file.     MEDICATIONS:  PTA Meds  Prior to Admission medications    Medication Sig Last Dose Taking? Auth Provider Long Term End Date   amLODIPine (NORVASC) 10 MG tablet Take 1 tablet (10 mg) by mouth daily   Cinda Ferrera PA Yes    amLODIPine (NORVASC) 10 MG tablet Take 10 mg by mouth daily  Patient not taking: Reported on 6/29/2023   Reported, Patient Yes    aspirin (ASA) 81 MG chewable tablet Take 81 mg by mouth   Reported, Patient     atenolol (TENORMIN) 50 MG tablet Take 50 mg by mouth daily.  Patient not taking: Reported on 1/7/2023   Reported, Patient Yes     atorvastatin (LIPITOR) 80 MG tablet TAKE 1 TABLET BY MOUTH EVERYDAY AT BEDTIME   Reported, Patient Yes    benzonatate (TESSALON) 200 MG capsule Take 1 capsule (200 mg) by mouth 3 times daily as needed for cough   Cinda Ferrera PA     citalopram (CELEXA) 40 MG tablet Take 40 mg by mouth daily.  Patient not taking: Reported on 1/7/2023   Reported, Patient Yes    eplerenone (INSPRA) 25 MG tablet Take 1 tablet by mouth daily at 2 pm   Reported, Patient Yes    FARXIGA 10 MG TABS tablet Take 1 tablet by mouth daily at 2 pm   Reported, Patient     hydrochlorothiazide (HYDRODIURIL) 50 MG tablet Take 1 tablet by mouth daily at 2 pm   Reported, Patient     HYDROcodone-acetaminophen 5-325 MG per tablet 1-2 po every six hours as needed pain  Patient not taking: Reported on 12/20/2019   Ingris Lomas PA-C     LAGEVRIO 200 MG capsule TAKE 4 CAPSULES BY MOUTH EVERY 12 HOURS FOR 5 DAYS   Reported, Patient     losartan (COZAAR) 50 MG tablet Take 50 mg by mouth daily   Reported, Patient Yes    metoprolol succinate ER (TOPROL XL) 50 MG 24 hr tablet TAKE 1 TABLET BY MOUTH IN THE MORNING AND IN THE EVENING   Reported, Patient Yes    predniSONE (DELTASONE) 20 MG tablet 2 tabs po daily for 5 days  Patient not taking: Reported on 6/29/2023   Eleonora Wu PA-C Yes    QUEtiapine (SEROQUEL) 25 MG tablet TAKE 1 TABLET BY MOUTH EVERYDAY AT BEDTIME  Patient not taking: Reported on 11/16/2023   Reported, Patient Yes    tadalafil (CIALIS) 20 MG tablet Take 1 tablet by mouth daily as needed.  Patient not taking: Reported on 1/7/2023   Reported, Patient Yes    tamsulosin (FLOMAX) 0.4 MG capsule TAKE 1 CAPSULE BY MOUTH EVERY DAY  Patient not taking: Reported on 1/7/2023   Reported, Patient     triamcinolone (KENALOG) 0.5 % cream Apply sparingly to affected area three times daily x 10-14 days.  Avoid use for longer than 2 weeks consecutively.  Patient not taking: Reported on 12/20/2019   Carissa Gutierrez PA-C Yes     triamterene-hydrochlorothiazide (MAXZIDE-25) 37.5-25 MG per tablet Take 1 tablet by mouth daily.   Reported, Patient Yes    zolpidem ER (AMBIEN CR) 12.5 MG CR tablet Take  by mouth nightly as needed.  Patient not taking: Reported on 1/7/2023   Reported, Patient Yes       Current Meds  Current Facility-Administered Medications   Medication Dose Route Frequency Provider Last Rate Last Admin    chlorhexidine (PERIDEX) 0.12 % solution 15 mL  15 mL Mouth/Throat Q12H Carissa Felder MD   15 mL at 06/16/24 0911    pantoprazole (PROTONIX) 2 mg/mL suspension 40 mg  40 mg Per Feeding Tube QAM  Carissa Felder MD        Or    pantoprazole (PROTONIX) IV push injection 40 mg  40 mg Intravenous QA Carissa Bello MD   40 mg at 06/16/24 0911    piperacillin-tazobactam (ZOSYN) 4.5 g vial to attach to  mL bag  4.5 g Intravenous Q6H Teresa Castro MD   4.5 g at 06/16/24 0906    sennosides (SENOKOT) syrup 10 mL  10 mL Oral or Feeding Tube BID Qing Silveira CNP        sodium chloride (PF) 0.9% PF flush 3 mL  3 mL Intracatheter Q8H Faviola Crain MD        tranexamic acid (CYKLOKAPRON) 100 mg/mL inhalation solution 500 mg  500 mg Nebulization Q6H Qing Silveira CNP         Infusion Meds  Current Facility-Administered Medications   Medication Dose Route Frequency Provider Last Rate Last Admin    EPINEPHrine (ADRENALIN) 16 mg in sodium chloride 0.9 % 250 mL infusion CENTRAL  0.01-0.06 mcg/kg/min (Dosing Weight) Intravenous Continuous iQng Silveira CNP 1.2 mL/hr at 06/16/24 1000 0.01 mcg/kg/min at 06/16/24 1000    fentaNYL (SUBLIMAZE) infusion   mcg/hr Intravenous Continuous Faviola Crain MD 3 mL/hr at 06/16/24 1428 150 mcg/hr at 06/16/24 1428    fentaNYL (SUBLIMAZE) infusion   mcg/hr Intravenous Continuous Carissa Felder MD 3 mL/hr at 06/16/24 1000 150 mcg/hr at 06/16/24 1000    midazolam (VERSED) 100 mg/100 mL NS infusion -  ADULT  1-8 mg/hr Intravenous Continuous Faviola Crain MD 4 mL/hr at 24 1426 4 mg/hr at 24 1426    No heparin required   Does not apply Continuous PRN Ellen Fletcher MD        Patient is already receiving anticoagulation with heparin, enoxaparin (LOVENOX), warfarin (COUMADIN)  or other anticoagulant medication   Does not apply Continuous PRN Faviola Crain MD        POST-filter replacement solution for CVVHD & CVVHDF (Phoxillum BK4/2.5)   CRRT Continuous Ellen Fletcher  mL/hr at 24 0927 New Bag at 24 0927    PRE-filter replacement solution for CVVHD & CVVHDF (Phoxillum BK4/2.5)  12.5 mL/kg/hr CRRT Continuous Ellen Fletcher MD 1,600 mL/hr at 24 0557 12.5 mL/kg/hr at 24 0557    propofol (DIPRIVAN) infusion  5-75 mcg/kg/min (Dosing Weight) Intravenous Continuous Carissa Felder MD 44.6 mL/hr at 24 1311 60 mcg/kg/min at 24 1311    sodium chloride 0.9% irrigation (bag)   Irrigation Continuous Teresa Castro MD   3,000 mL at 24 0932    vasopressin 1 unit/mL MAX Conc (PITRESSIN) infusion  0.5-4 Units/hr Intravenous Continuous Qing Silveira CNP   Held at 24 0931       ALLERGIES:    Allergies   Allergen Reactions    Bupropion Hives     PN: LW Reaction: HIVES    Penicillins Nausea and Vomiting     PN: LW Reaction: Vomiting       REVIEW OF SYSTEMS:  A comprehensive of systems was negative except as noted above.    SOCIAL HISTORY:   Social History     Socioeconomic History    Marital status:      Spouse name: Not on file    Number of children: Not on file    Years of education: Not on file    Highest education level: Not on file   Occupational History    Not on file   Tobacco Use    Smoking status: Former     Current packs/day: 0.00     Types: Cigarettes     Quit date: 2023     Years since quittin.6     Passive exposure: Never    Smokeless tobacco: Never   Substance and Sexual Activity     Alcohol use: Yes     Comment: daily    Drug use: No    Sexual activity: Yes     Partners: Female   Other Topics Concern    Parent/sibling w/ CABG, MI or angioplasty before 65F 55M? Not Asked   Social History Narrative    Not on file     Social Determinants of Health     Financial Resource Strain: Not on file   Food Insecurity: No Food Insecurity (2024)    Received from [facility]     Hunger Vital Sign     Worried About Running Out of Food in the Last Year: Never true     Ran Out of Food in the Last Year: Never true   Transportation Needs: No Transportation Needs (2024)    Received from [facility]     PRAPARE - Transportation     Lack of Transportation (Medical): No     Lack of Transportation (Non-Medical): No   Physical Activity: Sufficiently Active (2021)    Received from [facility]    Exercise Vital Sign     Days of Exercise per Week: 3 days     Minutes of Exercise per Session: 90 min   Stress: Low Risk  (3/30/2021)    Received from [facility], [facility]    Stress     How Stressed: Not on file   Social Connections: Not on file   Interpersonal Safety: Not At Risk (2024)    Received from [facility]     Humiliation, Afraid, Rape, and Kick questionnaire     Fear of Current or Ex-Partner: No     Emotionally Abused: No     Physically Abused: No     Sexually Abused: No   Housing Stability: Low Risk  (2024)    Received from [facility]     Housing Stability Vital Sign     Unable to Pay for Housing in the Last Year: No     Number of Times Moved in the Last Year: 1     Homeless in the Last Year: No       FAMILY MEDICAL HISTORY:   No family history on file.  Unknown Family history of kidney disease    PHYSICAL EXAM:   Temp  Av.7  F (35.9  C)  Min: 93.9  F (34.4  C)  Max: 98.6  F (37  C)  Arterial Line BP  Min: 69/63  Max: 176/76  Arterial Line MAP (mmHg)  Av.2 mmHg  Min: 60 mmHg  Max: 108  "mmHg      Pulse  Av  Min: 60  Max: 95 Resp  Av  Min: 0  Max: 21  FiO2 (%)  Av %  Min: 80 %  Max: 100 %  SpO2  Av.9 %  Min: 80 %  Max: 90 %       BP (!) 112/32   Pulse 87   Temp 98.6  F (37  C) (Oral)   Resp (!) 0   Ht 1.727 m (5' 7.99\")   Wt 124 kg (273 lb 5.9 oz)   SpO2 (!) 87%   BMI 41.58 kg/m     Date 24 07 - 24 0659   Shift 7004-5777 1256-2210 2586-2925 24 Hour Total   INTAKE   I.V. 586.1   586.1   Blood Components 900   900   Shift Total(mL/kg) 1486.1(11.98)   1486.1(11.98)   OUTPUT   Urine 0   0   Emesis/NG output 0   0   Other 1053.8   1053.8   Shift Total(mL/kg) 1053.8(8.5)   1053.8(8.5)   Weight (kg) 124 124 124 124      Admit Weight: 124 kg (273 lb 5.9 oz)     GENERAL APPEARANCE: intubated  EYES: no scleral icterus, pupils equal  Lymphatics: no cervical or supraclavicular LAD  Pulmonary: lungs clear to auscultation with equal breath sounds bilaterally, no clubbing  CV: regular rhythm, normal rate, no rub   - JVD -ve   - Edema +  GI: soft, nontender, normal bowel sounds  MS: no evidence of inflammation in joints, no muscle tenderness  :  robles  SKIN: no rash, warm, dry, no cyanosis  NEURO: face symmetric, intubated     LABS:   I have reviewed the following labs:  CMP  Recent Labs   Lab 24  0943 24  0925 24  0356 24  0345 24  0005 06/15/24  2233 06/15/24  1955 06/15/24  1944   NA  --  141  140  --  143  --  143  --  140  140   POTASSIUM  --  4.6  4.6  --  4.3  --  4.4  --  4.9  4.9   CHLORIDE  --  102  102  --  102  --  100  --  98  98   CO2  --  27  27  --  29  --  27  --  24  24   ANIONGAP  --  12  11  --  12  --  16*  --  18*  18*   * 129*  129* 120* 120*   < > 155*   < > 183*  183*   BUN  --  80.6*  81.1*  --  85.4*  --  87.9*  --  80.9*  80.9*   CR  --  2.63*  2.64*  --  2.90*  --  3.13*  --  3.06*  3.06*   GFRESTIMATED  --  26*  26*  --  23*  --  21*  --  22*  22*   PINKY  --  8.2*  8.2*  --  8.7*  --  9.3  " --  9.5  9.5   MAG  --  2.1  --  2.1  --   --   --  2.3   PHOS  --  4.6*  --  4.1  --   --   --  6.5*   PROTTOTAL  --   --   --  4.3*  --   --   --  4.1*   ALBUMIN  --  2.7*  --  2.7*  --   --   --  2.8*   BILITOTAL  --   --   --  1.2  --   --   --  1.1   ALKPHOS  --   --   --  79  --   --   --  75   AST  --   --   --  1,521*  --   --   --  666*   ALT  --   --   --  989*  --   --   --  897*    < > = values in this interval not displayed.     CBC  Recent Labs   Lab 06/16/24 0925 06/16/24  0345 06/15/24  2233 06/15/24  1944   HGB 9.4* 8.2* 7.3* 7.8*   WBC 23.0* 17.2* 16.6* 15.6*   RBC 3.18* 2.64* 2.37* 2.59*   HCT 26.9* 23.1* 20.7* 22.7*   MCV 85 88 87 88   MCH 29.6 31.1 30.8 30.1   MCHC 34.9 35.5 35.3 34.4   RDW 16.4* 15.0 15.2* 15.3*   PLT 80* 84* 71* 46*     INR  Recent Labs   Lab 06/16/24  0737 06/16/24  0345 06/15/24  1944   INR 1.40* 1.43* 1.66*   PTT 26 27 177*     ABG  Recent Labs   Lab 06/16/24  0925 06/16/24  0737 06/16/24  0607 06/16/24  0350 06/16/24 0345   PH 7.45 7.43 7.46*  --  7.49*   PCO2 46* 49* 48*  --  44   PO2 40* 44* 42*  --  44*   HCO3 32* 32* 34*  --  34*   O2PER 100 100 80 100 100  100  100      URINE STUDIES  No lab results found.  No lab results found.  PTH  No lab results found.  IRON STUDIES  No lab results found.    IMAGING:  All imaging studies reviewed by me.     Ellen Fletcher MD

## 2024-06-16 NOTE — PROGRESS NOTES
Physician Attestation   I, Pily Webster MD, was present with the medical/EMMA student who participated in the service and in the documentation of the note.  I have verified the history and personally performed the physical exam and medical decision making.  I agree with the assessment and plan of care as documented in the note.      Key findings: 64M with respiratory failure requiring mechanical ventilation and ECMO  Patient was well supported on ECMO, continuing to bleed from airways and bladder. Nursing noted pt pupils became fixed and dilated.  CT scan showed devastating bleed.  I was present for family discussion with wife, daughter and sister.  Family elected to pursue comfort measures over restorative care.    VV ECMO sub  Pily Webster MD  Date of Service (when I saw the patient): 06/16/24          ECMO Attending Progress Note  6/16/2024    Malvin Torres is a 64 year old male who was started on ECMO due to severe respiratory failure from profound bronchial bleeding.    Interval events:   Bronchoscopy overnight: bleeding in both main bronchial airway, source not identified.  CRRT started overnight  Pulmonology Consult  Cardiology Consult  Fixed dilated pupils. CT head showed massive ICH with midline shift and herniation.    The patients vital signs today are as follows:    Temp:  [93.9  F (34.4  C)-98.6  F (37  C)] 98.6  F (37  C)  Pulse:  [60-95] 72  Resp:  [18-21] 21  BP: (112)/(32) 112/32  MAP:  [60 mmHg-108 mmHg] 74 mmHg  Arterial Line BP: ()/(46-78) 110/56  FiO2 (%):  [80 %-100 %] 80 %  SpO2:  [80 %-90 %] 90 %    Intake/Output Summary (Last 24 hours) at 6/16/2024 1340  Last data filed at 6/16/2024 1300  Gross per 24 hour   Intake 2793.9 ml   Output 1807.8 ml   Net 986.1 ml    Vent Mode: PCV Plus assist  (Pressure Control Ventilation/ Assist Control)  FiO2 (%): 80 %  Resp Rate (Set): 18 breaths/min  Tidal Volume (Set, mL): 450 mL  PEEP (cm H2O): 10  cmH2O  Inspiratory Pressure (cm H2O) (Drager Beti): 25  Resp: 21     Recent Labs   Lab 06/16/24  0925 06/16/24  0737 06/16/24  0607 06/16/24  0350 06/16/24  0345   PH 7.45 7.43 7.46*  --  7.49*   PCO2 46* 49* 48*  --  44   PO2 40* 44* 42*  --  44*   HCO3 32* 32* 34*  --  34*   O2PER 100 100 80 100 100  100  100      Recent Labs   Lab 06/16/24  0925 06/16/24  0345 06/15/24  2233 06/15/24  1944   WBC 23.0* 17.2* 16.6* 15.6*   HGB 9.4* 8.2* 7.3* 7.8*     Creatinine   Date Value Ref Range Status   06/16/2024 2.64 (H) 0.67 - 1.17 mg/dL Final   06/16/2024 2.63 (H) 0.67 - 1.17 mg/dL Final   06/16/2024 2.90 (H) 0.67 - 1.17 mg/dL Final   06/15/2024 3.13 (H) 0.67 - 1.17 mg/dL Final       Physical Exam:   Cannula unchanged.  Minimal oozing.    ECMO Issues including assessments and plan on DOS 6/16/2024:    Neuro: sedated, intubated  CV: sinus rhythm  Pulm: Severe respiratory failure requiring ECMO and mechanical ventilation. Flows 4.17, sweep 3275 rpm. Keep vent settings at rest settings: PEEP10, FiO2 80%.  FEN/Renal: UOP: on CRRT. Massive clot in bladder.  Heme: Hemoglobin 7.8. Goals: if O2 sat >85% Hgb may drift to 7-8.  If O2 Sat <85% keep Hgb 10-12.  ID: [email or web address]     All pertinent labs, imaging studies, physical exam and medications have been reviewed by me.     This patient requires continued ICU monitoring and cares. Unfortunately, patient's clinical status has continued to deteriorate and last, he has intracranial hemorrhage with midline shift and brain herniation.  Dr. Webster, ECMO Attending, Dr. Jacobs, SICU Attending and I discussed the CT head results and change in clinical status with patient's wife, daughter and sister. They plan in comfort cares later today when they talked to rest of family members.     I appreciate the input of the SICU team for these issues. I have discussed patient care and treatment plan with the SICU team.       Faviola Crain MD  Surgical Critical Care  Fellow  [facility]

## 2024-06-16 NOTE — DISCHARGE SUMMARY
[facility]  Discharge Summary  Surgical Intensive Care Unit     Malvin Torres MRN# [id number]   YOB: 1960 Age: 64 year old     Date of Admission:  6/15/2024  Date of Discharge::  6/16/2024  Admitting Physician:  Faviola Crain MD, Pily Webster MD  Discharge Physician:  Dr. Reynaldo DO  Primary Care Physician:        Pk - OSE[facility]          Admission Diagnoses:   VV Respiratory ECMO  H/O extracorporeal membrane oxygenation treatment  Acute respiratory failure (H)          Discharge Diagnosis:   Multi-organ failure         Procedures:   6/16/24- Bronchoscopy           Consultations:   PHYSICAL THERAPY ADULT IP CONSULT  OCCUPATIONAL THERAPY ADULT IP CONSULT  RESPIRATORY CARE IP CONSULT  CARE MANAGEMENT / SOCIAL WORK IP CONSULT  SPIRITUAL HEALTH SERVICES IP CONSULT  PULMONARY GENERAL ADULT IP CONSULT  CARDIOLOGY GENERAL ADULT IP CONSULT  NEPHROLOGY ICU IP CONSULT  PHARMACY CRRT IP CONSULT  INTERVENTIONAL RADIOLOGY ADULT/PEDS IP CONSULT  INTERVENTIONAL PULMONARY ADULT IP CONSULT  THORACIC SURGERY ADULT IP CONSULT  PHARMACY TO DOSE VANCO  PHARMACY IP CONSULT  SPIRITUAL HEALTH SERVICES IP CONSULT         Imaging Studies:     Results for orders placed or performed during the hospital encounter of 06/15/24   XR Chest Port 1 View    Narrative    EXAM: XR CHEST PORT 1 VIEW  6/15/2024 8:48 PM     HISTORY:  VV ECMO cannulated       COMPARISON:  6/15/2024    FINDINGS:     AP portable spine radiograph of the chest. The esophageal temperature  probe is likely coiling within the cervical esophagus. Right IJ ECMO  venous cannula tip is in the brachiocephalic vein. Additional left  subclavian line projects near the brachycephalic confluence. Gastric  tube seen coursing below the diaphragm and out of the field-of-view.  Endotracheal tube seen at the mid thoracic trachea.     Trachea is midline. The cardiac silhouette is  completely obscured.  Extensive multifocal patchy consolidative opacities throughout the  right left hemithorax with near complete opacification involving the  left lung field. No appreciable pneumothorax.    No acute osseous abnormality. Visualized upper abdomen is  unremarkable.        Impression    IMPRESSION:   1. Esophageal temperature probe likely coiling within the upper  cervical esophagus, consider repositioning. The remainder of support  devices are in stable position, better appreciated on same-day CT  6/15/2024.  2. Extensive multifocal patchy and consolidative opacities throughout  the right and left hemithorax, left greater than right.    I have personally reviewed the examination and initial interpretation  and I agree with the findings.    EMANUEL CRUZ MD         SYSTEM ID:  [id number]   CT Head w/o Contrast    Narrative    EXAM: CT HEAD W/O CONTRAST  6/15/2024 7:27 PM     HISTORY:  VV ECMO       COMPARISON:  None available.    TECHNIQUE: Using multidetector thin collimation dual-energy helical  acquisition technique, axial, coronal and sagittal CT images from the  skull base to the vertex were obtained without intravenous contrast.   (topogram) image(s) also obtained and reviewed.    FINDINGS:  No acute intracranial hemorrhage, mass effect, or midline shift. No  acute loss of gray-white matter differentiation in the cerebral  hemispheres. Mild generalized parenchymal volume loss. Ventricles are  proportionate to the cerebral sulci. Clear basal cisterns.    The bony calvaria and the bones of the skull base are normal.  Scattered paranasal sinus mucosal thickening and near complete  opacification of the left sphenoid sinus including frothy debris,  nonspecific in the setting of intubation. Bilateral mastoid and middle  ear effusions. Grossly normal orbits.       Impression    IMPRESSION: No acute intracranial pathology.     CHARLES PERALES MD         SYSTEM ID:  [id number]   CT Chest Abdomen Pelvis  w/o Contrast    Narrative    EXAM: CT CHEST ABDOMEN PELVIS W/O CONTRAST 6/15/2024 7:35 PM    HISTORY: 64 years Male s/p VV ECMO    COMPARISON: None.    TECHNIQUE: Helical CT images from the thoracic inlet through the  symphysis pubis were obtained without IV contrast.    FINDINGS:   image(s) are noncontributory.    LINES/TUBES: Endotracheal tube with tip above the chey. Right IJ  ECMO venous cannula a tip terminating adjacent to the brachiocephalic  confluence. Gastric tube tip terminates in the stomach. Left  subclavian vein line terminating adjacent to the brachiocephalic  confluence. Right femoral ECMO venous cannula tip terminates adjacent  to the inferior cavoatrial junction. Romero catheter seen within the  bladder.    CHEST:  LOWER NECK: Unremarkable thyroid. Temperature probe appears coiled in  the neck.    PULMONARY: Extensive layering debris seen within the central airways  and complete occlusion involving the segmental bronchi of the left  lower lobe.. Bronchial wall thickening, most pronounced in the lower  lobes. Centrilobular and paraseptal emphysematous changes, most  pronounced in the upper lobes. Extensive multifocal consolidative and  patchy groundglass opacities throughout the right and left hemithorax,  most pronounced within the left upper and left lower lobe. There is  near complete collapse/consolidation involving the left lower lobe. No  significant pleural effusions. No appreciable pneumothorax.    CARDIAC: Heart size is borderline enlarged. No pericardial effusion.  Extensive coronary artery calcification/coronary stent.    MEDIASTINUM: The ascending thoracic aorta and main pulmonary artery  are normal in caliber. Common origin of the brachiocephalic artery and  left common carotid artery, normal anatomical variant. There are a few  scattered prominent and right axillary lymph nodes without definite  suspicious features.    ESOPHAGUS: Unremarkable.    ABDOMEN/PELVIS:  HEPATIC: No  suspicious focal hepatic lesion.    BILIARY: Gallbladder is moderately distended with hyperdense material  seen within the gallbladder lumen, presumed to represent sludge versus  excreted contrast material. No intra or extra hepatic biliary ductal  dilation.    PANCREAS: Fatty chart for changes of the pancreas. No focal pancreatic  lesion. No pancreatic ductal dilation.    SPLEEN: No splenomegaly. No suspicious lesions or masses.    ADRENALS: Unremarkable.    RENAL: No hydronephrosis. No renal calculi. No definite suspicious  renal masses, however limited on this noncontrast examination..    URINARY BLADDER: Mildly distended with Romero catheter in place.  Hyperdense layering material seen within the bladder lumen.    REPRODUCTIVE: Unremarkable.    GASTROINTESTINAL: Stomach is well distended with ingested material.  Normal caliber small and large bowel. No abnormal wall thickening or  enhancement. Retained contrast material seen within the large bowel  colonic diverticulosis without CT evidence of diverticulitis. Appendix  is unremarkable.    MESENTERY/PERITONEUM: Trace amount of simple appearing free fluid. No  pneumoperitoneum..    VASCULAR: Mild vascular calcifications abdominal aorta. Vascular  patency cannot be assessed on this noncontrast examination..    LYMPH NODES: No lymphadenopathy.    MUSCULOSKELETAL: Multilevel degenerative changes of the spine. No  acute or suspicious osseous abnormality. Body wall anasarca. Small  fat-containing umbilical hernia.      Impression    IMPRESSION:   1.  Stable position of support devices, as described in body the  report.   2.  Extensive multifocal consolidative and patchy groundglass  opacities throughout the right left hemithorax with complete  collapse/consolidation involving the left lower lobe, can be seen in  the setting of ARDS and/or infection. This also likely represents a  component of aspiration as there is extensive layering debris seen  within the central  airways with complete occlusion involving the  segmental bronchi of the left lower lobe.  3.  Layering hyperdense material seen within the bladder lumen, may  represent sequelae of excreted contrast, however hemorrhagic blood  products cannot be excluded. If there is clinical concern, consider  correlation with urinalysis.   4.  Trace amount of simple appearing free fluid in the abdomen and  body wall anasarca, consistent with fluid overload  5.  Temperature probe appears coiled in the neck.    I have personally reviewed the examination and initial interpretation  and I agree with the findings.    EMANUEL CRUZ MD         SYSTEM ID:  [id number]   XR Abdomen Port 1 View    Narrative    EXAM: XR ABDOMEN PORT 1 VIEW  6/15/2024 8:50 PM     HISTORY:  Verify OG placement       TECHNIQUE: Single frontal radiograph of the abdomen    COMPARISON:  CT 6/15/2024    FINDINGS:   AP portable supine radiograph of the abdomen. Gastric tube sidehole  and tip projected over the stomach. Right inferior approach ECMO  cannula tip projects near the inferior cavoatrial junction.    Nonobstructive bowel gas pattern without pneumatosis or portal venous  gas within the field of view.      Impression    IMPRESSION:   Gastric tube sidehole and tip project over the stomach.    I have personally reviewed the examination and initial interpretation  and I agree with the findings.    EMANUEL CRUZ MD         SYSTEM ID:  [id number]   XR Chest Port 1 View    Narrative    XR CHEST PORT 1 VIEW  6/16/2024 2:57 AM     HISTORY:  Intubated s/p ECMO cannulation       COMPARISON:  6/15/2024    TECHNIQUE: Portable, supine, frontal projection radiograph of the  chest.    FINDINGS:   Stable position of right IJ ECMO cannula, inferior approach ECMO  cannula, ET tube, gastric tube, and left subclavian central venous  catheter.    Trachea is midline. Shifting diffuse patchy and confluent airspace  opacities. No pneumothorax.        Impression    IMPRESSION:  Stable  support devices. Diffuse airspace opacities consistent with  ARDS.    I have personally reviewed the examination and initial interpretation  and I agree with the findings.    SUKHDEEP SAMAYOA MD         SYSTEM ID:  [id number]   CTA Chest with Contrast     Value    Radiologist flags Pulmonary emboli (Urgent)    Narrative    EXAM: CT chest with intravenous contrast. 6/16/2024 4:59 AM    HISTORY: DAH, active bleeding on bronch, please evaluate if able to  localize    TECHNIQUE: Helical acquisition of image data was performed for the  chest with intravenous contrast, in the arterial and delayed phases    COMPARISON: 6/15/2024    FINDINGS:  Support devices:  -Superior approach ECMO cannula terminates in the high SVC.  -Inferior approach ECMO cannula terminates at the inferior cavoatrial  junction.  -ET tube terminates 2.8 cm above the chey.  -Gastric tube terminus in the stomach.  -Left subclavian central venous catheter terminates in the high SVC.     Lungs: Extensive layering debris in the central airways. Extensive,  confluent consolidative and groundglass opacities, most pronounced in  the upper lobes. No pleural effusion. No pneumothorax.    Mediastinum: Normal thyroid. No mediastinal lymphadenopathy.  Borderline enlarged heart. No pericardial effusion. Normal caliber  great vessels. Pulmonary emboli involving the right interlobar artery  extending into the right middle and lower lobar arteries as well as  the segmental branches to the right upper lobe. No pulmonary emboli on  the left. Normal esophagus.    Upper abdomen: Visualized portions of the upper abdomen are  unremarkable.    Bones/soft tissue: No acute or suspicious osseous abnormality.      Impression    IMPRESSION:  1. Pulmonary emboli involving the right interlobar artery extending  into the right middle and lower lobar arteries as well as the  segmental branches to the right upper lobe. No pulmonary emboli on the  left.  2. No evidence of bronchial  artery active extravasation. No bronchial  artery hypertrophy.  3. Stable extensive, multifocal consolidative and groundglass  opacities throughout consistent with ARDS or may represent diffuse  alveolar hemorrhage.    [Urgent Result: Pulmonary emboli]    Finding was identified on 6/16/2024 5:18 AM.     Dr. Felder was contacted by Dr. Myers via [employer] at 6/16/2024 5:25  AM and indicated understanding of the urgent finding.     I have personally reviewed the examination and initial interpretation  and I agree with the findings.    ROZINA DICKERSON MD         SYSTEM ID:  [id number]   US Abdomen Limited    Narrative    EXAMINATION: US ABDOMEN LIMITED  6/16/2024 9:07 AM      CLINICAL HISTORY: Transaminases    COMPARISON: CT 6/15/2024        FINDINGS:  The liver is normal in contour with heterogeneously increased  echogenicity. There is no intrahepatic or extrahepatic biliary ductal  dilatation. The common bile duct measures 9 mm. The gallbladder is  distended, without gallstones, wall thickening, or pericholecystic  fluid. Mild layering sludge.    The pancreas is not well-visualized.    The visualized upper abdominal aorta and inferior vena cava are  normal.      The right kidney measures 10.3 cm. There is no significant urinary  tract dilation. The urinary bladder is well-distended and normal in  morphology. Heterogeneous debris including an intraluminal nodular  structure measuring 5.4 x 5.2 x 6.3 cm, likely clot. Romero catheter in  place.      Impression    IMPRESSION:   1. Heterogeneously increased echogenicity of the hepatic parenchyma,  which can be seen in intrinsic hepatic parenchymal disease such as  hepatic steatosis.  2. Distended gallbladder with layering internal sludge and mildly  dilated common bile duct. No pericholecystic fluid, gallbladder wall  thickening, or additional findings of acute cholecystitis.  3. Distended bladder with echogenic layering debris, likely clot. This  is a known finding as per  technologist note with layering debris in  the bladder on CT present. Romero catheter is in place.    I have personally reviewed the examination and initial interpretation  and I agree with the findings.    SUKHDEEP SAMAYOA MD         SYSTEM ID:  [id number]   CT Head w/o Contrast     Value    Radiologist flags Acute intracranial hemorrhage (AA)    Narrative    CT HEAD W/O CONTRAST 6/16/2024 10:34 AM    History: Dilated pupils bilaterally. New finding. s/p VV ECMO     Comparison: Head CT 6/15/2024    Technique: Using multidetector thin collimation dual-energy helical  acquisition technique, axial, coronal and sagittal CT images from the  skull base to the vertex were obtained without intravenous contrast.   (topogram) image(s) also obtained and reviewed.    Findings: There is a large acute intraparenchymal hemorrhage involving  the left frontoparietal region with 2.1 cm rightward midline shift.  Scattered bilateral subarachnoid hemorrhage most pronounced along the  posterior right parietal lobe. Subsequent rightward subfalcine  herniation and left uncal herniation. Complete effacement of the left  lateral ventricle with compression of the anterior right lateral  ventricle.    The bony calvaria and the bones of the skull base are normal.  Scattered pansinus mucosal thickening and fluid, nonspecific in the  setting of intubation and similar to the prior study. Also bilateral  mastoid and middle ear effusions, unchanged.      Impression    Impression:  Large left frontoparietal intraparenchymal hemorrhage with associated  2.1 cm rightward midline shift, rightward subfalcine and uncal  herniation. Scattered subarachnoid hemorrhages over both cerebral  hemispheres, new from prior.    [Critical Result: Acute intracranial hemorrhage]    Finding was identified on 6/16/2024 10:37 AM.     Qing Shah Mater was contacted by Dr. Sprague on 6/16/2024 10:42 AM  and verbalized understanding of the critical result.      I have  personally reviewed the examination and initial interpretation  and I agree with the findings.    CHARLES PERALES MD         SYSTEM ID:  [id number]   Echo Complete     Value    LVEF  55-60%    Narrative    [id number]  [id number]  [id number]  [id number]^[email or web address]^JOE     [facility],[place]  Echocardiography Laboratory  [address] [place] [id number]     Name: KOLE CARSON  MRN: [id number]  : 1960  Study Date: 2024 07:54 AM  Age: 64 yrs  Gender: Male  Patient Location: [facility]  Reason For Study: CAD  Ordering Physician: JOE RENEE  Referring Physician: ZEN NARANJO  Performed By: Luciana Spain     BSA: 2.3 m2  Height: 67 in  Weight: 273 lb  HR: 82  BP: 123/74 mmHg  ______________________________________________________________________________  Procedure  Complete Portable Echo Adult.  ______________________________________________________________________________  Interpretation Summary  VV ECMO AT 4.6 LPM  Global and regional left ventricular function is normal with an EF of 55-60%.  Global right ventricular function is mildly to moderately reduced.  Mild to moderate right ventricular dilation is present.  Flow from the ECMO cannula is directed towards the tricuspid valve.  No pericardial effusion is present.  ______________________________________________________________________________  Left Ventricle  Global and regional left ventricular function is normal with an EF of 55-60%.     Right Ventricle  Mild to moderate right ventricular dilation is present. Global right  ventricular function is mildly to moderately reduced.     Mitral Valve  Mild to moderate mitral insufficiency is present.     Aortic Valve  Aortic valve sclerosis is present.     Tricuspid Valve  Mild tricuspid insufficiency is present. Pulmonary artery systolic pressure  cannot be assessed.     Pulmonic Valve  Trace pulmonic insufficiency is present.     Vessels  Flow from the ECMO cannula  is directed towards the tricuspid valve.     Pericardium  No pericardial effusion is present.     ______________________________________________________________________________  MMode/2D Measurements & Calculations  IVSd: 0.89 cm  LVIDd: 4.5 cm  LVIDs: 3.4 cm  LVPWd: 0.97 cm  FS: 25.7 %  LV mass(C)d: 139.2 grams  LV mass(C)dI: 60.3 grams/m2  asc Aorta Diam: 3.5 cm  Asc Ao diam index BSA (cm/m2): 1.5  Asc Ao diam index Ht(cm/m): 2.1     RWT: 0.43  TAPSE: 1.8 cm     Doppler Measurements & Calculations  RV S Chris: 13.6 cm/sec     ______________________________________________________________________________  Report approved by: MD Domenioc Amato 06/16/2024 10:11 AM                    Medications Prior to Admission:     Medications Prior to Admission   Medication Sig Dispense Refill Last Dose    amLODIPine (NORVASC) 10 MG tablet Take 1 tablet (10 mg) by mouth daily 45 tablet 0     amLODIPine (NORVASC) 10 MG tablet Take 10 mg by mouth daily (Patient not taking: Reported on 6/29/2023)       aspirin (ASA) 81 MG chewable tablet Take 81 mg by mouth       atenolol (TENORMIN) 50 MG tablet Take 50 mg by mouth daily. (Patient not taking: Reported on 1/7/2023)       atorvastatin (LIPITOR) 80 MG tablet TAKE 1 TABLET BY MOUTH EVERYDAY AT BEDTIME       benzonatate (TESSALON) 200 MG capsule Take 1 capsule (200 mg) by mouth 3 times daily as needed for cough 30 capsule 0     citalopram (CELEXA) 40 MG tablet Take 40 mg by mouth daily. (Patient not taking: Reported on 1/7/2023)       eplerenone (INSPRA) 25 MG tablet Take 1 tablet by mouth daily at 2 pm       FARXIGA 10 MG TABS tablet Take 1 tablet by mouth daily at 2 pm       hydrochlorothiazide (HYDRODIURIL) 50 MG tablet Take 1 tablet by mouth daily at 2 pm       HYDROcodone-acetaminophen 5-325 MG per tablet 1-2 po every six hours as needed pain (Patient not taking: Reported on 12/20/2019) 25 tablet 0     LAGEVRIO 200 MG capsule TAKE 4 CAPSULES BY MOUTH EVERY 12  HOURS FOR 5 DAYS       losartan (COZAAR) 50 MG tablet Take 50 mg by mouth daily       metoprolol succinate ER (TOPROL XL) 50 MG 24 hr tablet TAKE 1 TABLET BY MOUTH IN THE MORNING AND IN THE EVENING       predniSONE (DELTASONE) 20 MG tablet 2 tabs po daily for 5 days (Patient not taking: Reported on 6/29/2023) 10 tablet 0     QUEtiapine (SEROQUEL) 25 MG tablet TAKE 1 TABLET BY MOUTH EVERYDAY AT BEDTIME (Patient not taking: Reported on 11/16/2023)       tadalafil (CIALIS) 20 MG tablet Take 1 tablet by mouth daily as needed. (Patient not taking: Reported on 1/7/2023)       tamsulosin (FLOMAX) 0.4 MG capsule TAKE 1 CAPSULE BY MOUTH EVERY DAY (Patient not taking: Reported on 1/7/2023)       triamcinolone (KENALOG) 0.5 % cream Apply sparingly to affected area three times daily x 10-14 days.  Avoid use for longer than 2 weeks consecutively. (Patient not taking: Reported on 12/20/2019) 30 g 0     triamterene-hydrochlorothiazide (MAXZIDE-25) 37.5-25 MG per tablet Take 1 tablet by mouth daily.       zolpidem ER (AMBIEN CR) 12.5 MG CR tablet Take  by mouth nightly as needed. (Patient not taking: Reported on 1/7/2023)                 Discharge Medications:     Current Discharge Medication List        CONTINUE these medications which have NOT CHANGED    Details   !! amLODIPine (NORVASC) 10 MG tablet Take 1 tablet (10 mg) by mouth daily  Qty: 45 tablet, Refills: 0    Associated Diagnoses: Hypertension, unspecified type      !! amLODIPine (NORVASC) 10 MG tablet Take 10 mg by mouth daily      aspirin (ASA) 81 MG chewable tablet Take 81 mg by mouth      atenolol (TENORMIN) 50 MG tablet Take 50 mg by mouth daily.      atorvastatin (LIPITOR) 80 MG tablet TAKE 1 TABLET BY MOUTH EVERYDAY AT BEDTIME      benzonatate (TESSALON) 200 MG capsule Take 1 capsule (200 mg) by mouth 3 times daily as needed for cough  Qty: 30 capsule, Refills: 0    Associated Diagnoses: Acute non-recurrent sinusitis, unspecified location      citalopram (CELEXA)  40 MG tablet Take 40 mg by mouth daily.      eplerenone (INSPRA) 25 MG tablet Take 1 tablet by mouth daily at 2 pm      FARXIGA 10 MG TABS tablet Take 1 tablet by mouth daily at 2 pm      hydrochlorothiazide (HYDRODIURIL) 50 MG tablet Take 1 tablet by mouth daily at 2 pm      HYDROcodone-acetaminophen 5-325 MG per tablet 1-2 po every six hours as needed pain  Qty: 25 tablet, Refills: 0    Associated Diagnoses: Closed fracture of great toe of right foot      LAGEVRIO 200 MG capsule TAKE 4 CAPSULES BY MOUTH EVERY 12 HOURS FOR 5 DAYS      losartan (COZAAR) 50 MG tablet Take 50 mg by mouth daily      metoprolol succinate ER (TOPROL XL) 50 MG 24 hr tablet TAKE 1 TABLET BY MOUTH IN THE MORNING AND IN THE EVENING      predniSONE (DELTASONE) 20 MG tablet 2 tabs po daily for 5 days  Qty: 10 tablet, Refills: 0    Associated Diagnoses: Acute cough; Acute bacterial conjunctivitis of left eye; Wheezing; Acute sinusitis with coexisting condition requiring prophylactic treatment; Hypertension, unspecified type; Acute bronchitis with coexisting condition requiring prophylactic treatment; Smoker      QUEtiapine (SEROQUEL) 25 MG tablet TAKE 1 TABLET BY MOUTH EVERYDAY AT BEDTIME      tadalafil (CIALIS) 20 MG tablet Take 1 tablet by mouth daily as needed.      tamsulosin (FLOMAX) 0.4 MG capsule TAKE 1 CAPSULE BY MOUTH EVERY DAY      triamcinolone (KENALOG) 0.5 % cream Apply sparingly to affected area three times daily x 10-14 days.  Avoid use for longer than 2 weeks consecutively.  Qty: 30 g, Refills: 0    Associated Diagnoses: Dermatitis      triamterene-hydrochlorothiazide (MAXZIDE-25) 37.5-25 MG per tablet Take 1 tablet by mouth daily.      zolpidem ER (AMBIEN CR) 12.5 MG CR tablet Take  by mouth nightly as needed.       !! - Potential duplicate medications found. Please discuss with provider.                   Brief History of Illness:   Malvin Torres is a 64 year old male admitted on 6/15/2024. He was cannulated for VV ECMO at  [facility] and transferred to [facility] ICU for management and further monitoring.            Hospital Course:   Patient arrived to ICU intubated, sedated, and VV ECMO cannulated. Patient remained in critical condition upon arrival to [facility] with maximal ventilatory and hemodynamic support. CRRT was initiated due to worsening acute kidney failure. Bronchoscopy was performed due to ongoing bleeding from airway. On the morning of 6/16/24, tt was noted that patient had dilated fixed pupils. CT head showed large left intraparenchymal hemorrhage with 2.1 cm midline shift causing brain herniation. This condition was deemed irreversible which was discussed with patient's wife and family members. Family decided to proceed with comfort cares for patient. All supportive measures were discontinued. Death was pronounced at 1645 on 6/16/24.          Day of Discharge Physical Exam:     Physical Exam: Unresponsive to noxious stimuli, Spontaneous respirations absent, Breath sounds absent, Carotid pulse absent, Heart sounds absent, Pupillary light reflex absent, and Corneal blink reflex absent          Final Pathology Result:   No pathology submitted           Discharge Instructions and Follow-Up:     No discharge procedures on file.         Home Health Care:     Not needed           Discharge Disposition:     Discharged to [facility].      Patient was discussed with SICU staff, Dr. Jacobs.    Teresa Castro, DO  General Surgery, PGY-1

## 2024-06-16 NOTE — PROCEDURES
Procedure:   Bronchoscopy         Indication:   [facility]        Consent:   Omitted due to medical emergency.         Pre-medication:   The patient is on mechanical ventilation       Procedure Summary:   Time out was performed.   The bronchoscope inserted through the ETT    Airway examination:  Exam of trachea and bronchus of the right and left bronchial tree. At the distal trachea/chey, there appeared to be some oozing of blood coming out of both lungs. Bleeding was significant and visualization was difficult. Procedure was stopped        Complications:   No immediate complications.     Miguel Dia MD  Pulmonary, Critical Care and Sleep Medicine

## 2024-06-16 NOTE — PROGRESS NOTES
Called to patient bedside in regards to dilated and fixed pupils.  Stat head CT was ordered.  Initial images showing large left-sided hemorrhagic stroke with midline shift and herniation.  This is a nonsurvivable event.  Will discuss with family in regards to withdrawing care.    Addendum:    Family meeting held with patient's wife Elke, and daughter Magaly.  Dr. Webster was present representing the ECMO team.  And bedside nurse was present as well.    A recap of the patient's medical conditions was undertaken explaining the problems with continued pulmonary hemorrhage, acute respiratory distress syndrome, acute renal failure requiring continue renal replacement therapy, and recent history of ST elevation myocardial infarction's.    We recapped current treatments with VV ECMO cannulation, CRRT, ventilation, and blood pressure support.  Patient understood that the patient also showed evidence of shock liver.    Earlier CT scans of the head were reviewed and shown to have no acute process upon admission.    Discussion was then turned to new findings of acute intracranial hemorrhage with devastating herniation.  Also new findings of severe mitral valve regurgitation found on recent echo.    Patient's family understood the severity of these findings and the grave nature of his condition.  Our team informed the family that in collection of all of these disorders there would be no meaningful return of life.    All questions were answered.  Family would like to spend some time with the patient in his current state and will likely be moved forward with withdrawal of care and comfort cares.    At this time there will be no further escalations of care provided by the surgical ICU team.    DNR order placed    Comfort care orders placed

## 2024-06-16 NOTE — CONSULTS
Cardiology Inpatient Consultation  Rebecca 15, 2024    Reason for Consult:  A cardiology consult was requested to provide clinical guidance regarding DAPT    Assessment and Recommendation:    Inferior/Posterior STEMI S/P EDISON x2 p-mLcx  Mitral regurgitation   Concerns for cardiogenic shock.   Diffuse alveolar hemorrhage     Patient is currently hemodynamic stable on current support.     In regards DAPT - Due to STEMI, he benefits from DAPT for at least 1 year. Due to ongoing pulmonary hemorrhage, risk and benefits should be considered. We recommend to start single agent with Cangrelor, it can be stopped in case of worsening bleeding. Risk and benefits discussed with primary team. In the future he benefits from an oral P2Y12 and aspirin.     In regards MR - Patient is having signs of congestive hepatopathy, not clear to me if its exclusively driven by cardiogenic shock caused by MR. If that is the case, patient might benefit from inotropic support and we recommend to try to wean off completely levophed and in case it is tolerable, start afterload reduction with antihypertensive agents.       Plan:  Start cangrelor drip   Hold on aspirin   In case cardiogenic shock is a concern, try to wean levophed and in case room on BP, start afterload reduction with continues IV agents. Inotropic agents such as dobutamine can be considered too.   Formal  TTE in am   Please order EKG     Plan of care discussed with Dr. Beckett, who agrees with above plan.    Thank you for consulting the cardiovascular services at the [facility]. Please do not hesitate to call us with any questions.     Yamil Abad MD  Cardiology Fellow      HPI:   Malvin Torres is a 64 year old male with PMH of HTN, HLP, CAD and obesity that is currently admitted to the CVICU due to diffuse alveolar hemorrhage currently on VVECMO. Cardiology was consulted for DAPT management.     All his history was taken from physical  outside medical records. Not family at the bedside, not care everywhere available.   He was recently hospitalized from 05/28-5/30 due to STEMI - PCI - EDISON pLCx (remaining of the angiogram  of the RCA and 50-70% stenosis of Mid Lcx). Th same day that he had the PCI, he had recurrence of chest pain, required repeated angiogram, finding 70% stenosis of mid Lcx (where it was read as 50-70% before) and additional EDISON was left in. He was discharged on 05/30, got readmitted on 05/31 due to chest pain, another angiogram was done and stent thrombosis was found - EDISON placed on Lcx to LATOYA. After that, patient has had multiple complications such as alveolar hemorrhage, ileus and renal failure needing CRRT. During that hospital course from 05/31 until today (06/15), he he has been on and off P2Y12 and aspirin. It is not clear to me the agents he was initially started on and when they were switched or discontinued.  He arrived today to the CVICU on Heparin for VVECMO and Aspirin 300 mg rectally.       At the moment of my assessment the patient is on mechanical ventilation and VV-ECMO. Levophed running at 0.04. Hemodynamic stable.   Bedside US done by me - LV function is preserved probably LVEF 50%, Moderate MR is seen by color doppler.       Review of Systems:    Complete review of systems was performed and negative except per HPI.    PMH:  STEMI (inferoposterior infarct ) s/p EDISON in Circumflex artery  on 05/30/2024, came back due to recurrent chest pain with ST changes, he had second stent to be placed in the left PL branch. Then, he got complicated with stent thrombosis.. He was evaluated in ER due to recurrent chest pain and near syncope. Work-up included EKG showing inferior ST elevation with reciprocal changes.  CAD s/p stents  Hypertension  Depression  Obesity    Active Problems:  Patient Active Problem List    Diagnosis Date Noted    Acute respiratory failure (H) 06/15/2024     Priority: Medium    H/O extracorporeal  membrane oxygenation treatment 06/15/2024     Priority: Medium     Social History:  Social History     Tobacco Use    Smoking status: Former     Current packs/day: 0.00     Types: Cigarettes     Quit date: 2023     Years since quittin.6     Passive exposure: Never    Smokeless tobacco: Never   Substance Use Topics    Alcohol use: Yes     Comment: daily    Drug use: No     Family History:  No family history on file.    Medications:  Current Facility-Administered Medications   Medication Dose Route Frequency Provider Last Rate Last Admin    chlorhexidine (PERIDEX) 0.12 % solution 15 mL  15 mL Mouth/Throat Q12H Carissa Felder MD        [START ON 2024] pantoprazole (PROTONIX) 2 mg/mL suspension 40 mg  40 mg Per Feeding Tube QAM Carissa Bello MD        Or    [START ON 2024] pantoprazole (PROTONIX) IV push injection 40 mg  40 mg Intravenous QAM Carissa Bello MD           Current Facility-Administered Medications   Medication Dose Route Frequency Provider Last Rate Last Admin    amiodarone (NEXTERONE) 1.8 mg/mL in sodium chloride 0.9% in non-PVC container 500 mL ADULT STANDARD infusion  1 mg/min Intravenous Continuous Carissa Felder MD        [START ON 2024] amiodarone (NEXTERONE) 1.8 mg/mL in sodium chloride 0.9% in non-PVC container 500 mL ADULT STANDARD infusion  0.5 mg/min Intravenous Continuous Carissa Felder MD        cangrelor (KENGREAL) 50 mg in sodium chloride 0.9 % 250 mL infusion  0.75 mcg/kg/min (Dosing Weight) Intravenous Continuous Carissa Felder MD        fentaNYL (SUBLIMAZE) infusion   mcg/hr Intravenous Continuous Carissa Felder MD 1 mL/hr at 06/15/24 2047 50 mcg/hr at 06/15/24 2047    heparin (porcine) 100 unit/mL in 0.45% Sodium Chloride ANTICOAGULANT infusion  10-50 Units/kg/hr (Ideal) Other Continuous Pily Webster MD        norepinephrine (LEVOPHED) 16 mg in  mL infusion MAX CONC CENTRAL LINE  0.01-0.6 mcg/kg/min  "(Dosing Weight) Intravenous Continuous Faviola Crain MD 4.7 mL/hr at 06/15/24 2049 0.04 mcg/kg/min at 06/15/24 2049    Patient is already receiving anticoagulation with heparin, enoxaparin (LOVENOX), warfarin (COUMADIN)  or other anticoagulant medication   Does not apply Continuous PRN Faviola Crain MD        propofol (DIPRIVAN) infusion  5-75 mcg/kg/min (Dosing Weight) Intravenous Continuous Carissa Felder MD 18.6 mL/hr at 06/15/24 2049 25 mcg/kg/min at 06/15/24 2049       Physical Exam:  Temp:  [93.9  F (34.4  C)] 93.9  F (34.4  C)  Pulse:  [60] 60  No intake or output data in the 24 hours ending 06/15/24 2105  GEN:Intubated and sedated   HEENT: No discharge  EYES: no icterus  CV: RRR, normal s1/s2, no murmurs/rubs/s3/s4, no heave.   CHEST: decreased breath sounds   ABD: soft, non-tender, normal active bowel sounds  : no flank/suprapubic tenderness  EXTR: pulses present. No clubbing, cyanosis or edema.   NEURO: intubated and sedated     Diagnostics:  All labs and imaging were reviewed, of note:    CMP  Recent Labs   Lab 06/15/24  1955 06/15/24  1944   NA  --  140  140   POTASSIUM  --  4.9  4.9   CHLORIDE  --  98  98   CO2  --  24 24   ANIONGAP  --  18*  18*   * 183*  183*   BUN  --  80.9*  80.9*   CR  --  3.06*  3.06*   GFRESTIMATED  --  22*  22*   PINKY  --  9.5  9.5   MAG  --  2.3   PHOS  --  6.5*   PROTTOTAL  --  4.1*   ALBUMIN  --  2.8*   BILITOTAL  --  1.1   ALKPHOS  --  75   AST  --  666*   ALT  --  897*     CBC  Recent Labs   Lab 06/15/24  1944   WBC 15.6*   RBC 2.59*   HGB 7.8*   HCT 22.7*   MCV 88   MCH 30.1   MCHC 34.4   RDW 15.3*   PLT 46*     INR  Recent Labs   Lab 06/15/24  1944   INR 1.66*     Arterial Blood Gas  Recent Labs   Lab 06/15/24  1947 06/15/24  1942   PH  --  7.59*   PCO2  --  29*   PO2  --  82   HCO3  --  28   O2PER 100  100 100       No results found for: \"TROPI\", \"TROPONIN\", \"TROPR\", \"TROPN\"    EKG:    Transthoracic echocardiogram: "     Nuclear stress test:    all other ROS negative except as per HPI

## 2024-06-16 NOTE — PROGRESS NOTES
SICU PROGRESS  NOTE    Primary Team: SICU ECMO  Reason for Critical Care Admission: VV ECMO  Admitting Physician: Pliy Webster MD  Date of Admission:  6/15/2024      Assessment: Critical Care   Malvin Torres is a 64 year old male with hx of smoking, recent inferior STEMI (5/2024) s/p EDISON to Lcx c/b in stent thrombosis s/p repeat PCI after which he developed hemoptysis and SHAYNE, alveolar hemorrhage resulting in acute hypoxemix/hypercarbic respiratory failure requiring mechanical ventilation and ultimately VV ECMO for which he was transferred to [facility] on 6/15.     Changes:    Addendum: CT head consistent with devastating neurologic injury. Large L IPH with 2.1 midline shift, subfalcine and uncal herniation, scattered SAH. Discussed with family at bedside by ICU staff.       - IP Consult- unavailable today. Thoracic surgery consult  - Hematology consult  - Transfusion medicine consult  - Rheumatology consult  - TEG   - Continue CRRT, increase UF  - Increase TXA nebs to q6  - Formal ECHO, follow with NILAM  - Start Epinephrine and vasopressin  - RUQ US  - Fungitell, galactomannan, RVP, MRSA swab  - repeat EKG  - add  bladder irrigation  - Start vancomycin and Zosyn  - CT head STAT   - Add flowtrack  - 3 PRBC today      Plan: Critical Care   Neuro/ pain/ sedation:  #Acute pain  #Encephalopathy  - Monitor neurological status. Notify provider for any acute changes in exam  - pain/sedation: propofol, fentanyl gtt  - Off paralytics overnight  - Daily sedation vacation  - RAAS goal -3 to -4, given refractory hypoxia  -  Called to the bedside to evaluate dilated fixed pupils at 0950. STAT head CT     Pulmonary:  # Pulmonary hemorrhage  #ARDS s/p VV ECMO  - Ventilator settings: Vent Mode: PCV Plus assist  (Pressure Control Ventilation/ Assist Control)  FiO2 (%): 80 %  Resp Rate (Set): 18 breaths/min  Tidal Volume (Set, mL): 450 mL  PEEP (cm H2O): 10 cmH2O  Inspiratory Pressure (cm H2O) (Drager Beti):  25  Resp: 18  - VV ECMO 4.5L/3400/6/100  - Post oxyhemoglobin >400, Delta P 25. Off anticoagulation on the circuit  - PaO2 40s, currently at limit of RPMs we can increase without chattering  - increase hemoglobin goal > 10-12  - Bronchoscopy overnight encountered oozing from both lungs, visualization difficult and procedure stopped  - IP consult however not available on the weekend.Thoracic surgery     Cardiovascular:  # Recent STEMI s/p EDISON c/b occlusion  # Mitral regurgitation  # Shocked, mixed  - Levophed on admission, weaned down  - Goal MAP > 65  - Normal ECHO at OSH yesterday. Overnight,bedside cardiology ECHO consistent with moderate to severe MR. Repeat ECHO today  - NILAM  - given elevated LFTs, concerning for hepatic congestion  - Start inotropic support- Epinephrine given hypotension. Add vasopressin  - Cardiology consulted, appreciate recs  - On cangrelor, no aspirin    GI/Nutrition:  # ileus  #Ischemic hepatopathy vs hepatic congestion  - Diet: NPO for Medical/Clinical Reasons Except for: MedsNPO  - Tube feeds when appropriate  - OG to LIS for now  - nutrition consult  - RUQ US     Renal/ Fluid Balance:   # Lactic acidosis  #volume overload  #Hematuria  - Robles for strict I/O  - ICU electrolyte replacement protocols  - CRRT, increase UF  - Hematuria, robles clotted off. Insert 3 way robles and bladder irrigation.   Endocrine:  # Stress hyperglycemia  - FSG, ISS q4h, goal blood glucose < 180    ID:  #HCAP  - Concern for sepsis  - Repeat BC, urinalysis, sputum, BAL if possible, aspergillus, galactomannan, RVP, COVID swab  - Empiric Vancomycin and Zosyn for pneumonia      Hematology:  #Coagulopathy  #Pulmonary hemorrhage  #Anemia of critical illness  #Acute blood loss anemia  - Goal Hgb >10-12 for refractory hypoxia  - 3 PRBC given thisam, 1 PRBC overnight with 1 platelet  - Quantra consistent with antiplatelet therapy.   - TEG peding  - C3/C4, RNP antibody, DNA double stranded antibodies, ERIN, cardiolipin,  "RF, ANCA, CRP, ESR pending.   - Hematology, transfusion medicine and rheumatology consults.   - Monitor for increased bleeding with cangrelor gtt and heparin for ECMO circuit    MSK:   # deconditioning due to critical illness  - PT and OT on hold     Lines/ tubes/ drains:  Romero Catheter: PRESENT, indication: ICU only: hourly urine output needed for patient care  Lines: CVC, arterial line, ECMO cannula x2 RIJ and R fem, OG, ETT, Romero    Prophylaxis:  - DVT Prophylaxis: heparin  - PUD Prophylaxis: protonix    Code Status: Full CodeFull    Disposition:  - SICU  CC time 80 minutes       Clinically Significant Risk Factors Present on Admission            Carissa Felder MD  Surgery PGY3    Securely message with [employer] (more info)  Text page via [employer] Paging/Directory     ______________________________________________________________________  SUBJECTIVE  Events reviewed. Hypertensive this am. Followed by hypotension. Sedated. Unable to obtain ROS        Physical Exam   BP (!) 112/32   Pulse 76   Temp 97.9  F (36.6  C)   Resp 18   Ht 1.727 m (5' 7.99\")   Wt 124 kg (273 lb 5.9 oz)   SpO2 (!) 81%   BMI 41.58 kg/m      General: intubated, sedated  HEENT: ETT, OG secure in place. ECMO cannula secure right neck.   Neuro: sedated, not responsive  CV: regular rate  Pulm: intubated, mechanical ventilation, equal chest rise. No chugging on ECMO circuit.   Abd: soft, mildly distended  : Romero in place, very little UOP, gross hematuria  Extremities: wwp      Data   I reviewed all medications, new labs and imaging results over the last 24 hours.  Arterial Blood Gases   Recent Labs   Lab 06/16/24  0607 06/16/24  0345 06/16/24  0213 06/16/24  0006   PH 7.46* 7.49* 7.54* 7.56*   PCO2 48* 44 39 36   PO2 42* 44* 42* 39*   HCO3 34* 34* 33* 32*       Complete Blood Count   Recent Labs   Lab 06/16/24  0345 06/15/24  2233 06/15/24  1944   WBC 17.2* 16.6* 15.6*   HGB 8.2* 7.3* 7.8*   PLT 84* 71* 46*       Basic Metabolic " Panel  Recent Labs   Lab 06/16/24  0356 06/16/24 0345 06/16/24  0005 06/15/24  2233 06/15/24  1955 06/15/24  1944   NA  --  143  --  143  --  140  140   POTASSIUM  --  4.3  --  4.4  --  4.9  4.9   CHLORIDE  --  102  --  100  --  98  98   CO2  --  29  --  27  --  24  24   BUN  --  85.4*  --  87.9*  --  80.9*  80.9*   CR  --  2.90*  --  3.13*  --  3.06*  3.06*   * 120* 146* 155*   < > 183*  183*    < > = values in this interval not displayed.       Liver Function Tests  Recent Labs   Lab 06/16/24  0345 06/15/24  1944   AST 1,521* 666*   * 897*   ALKPHOS 79 75   BILITOTAL 1.2 1.1   ALBUMIN 2.7* 2.8*   INR 1.43* 1.66*       Pancreatic Enzymes  Recent Labs   Lab 06/15/24  1944   LIPASE 105*   AMYLASE 242*       Coagulation Profile  Recent Labs   Lab 06/16/24  0345 06/15/24  1944   INR 1.43* 1.66*   PTT 27 177*       IMAGING:  Recent Results (from the past 24 hour(s))   XR Chest Port Special View Right    Narrative    EXAM: XR CHEST AP PORT      DATE: 6/15/2024 8:15 AM    COMPARISON: Rebecca 10, 2024.    CLINICAL DATA: Infiltrate.    ICD 10:  I21.3 ST elevation (STEMI) myocardial infarction of unspecified site R07.9 Chest pain, unspecified    VIEWS: An AP, portable, supine view of the chest was obtained.    FINDINGS: Endotracheal tube tip 7.8 cm above the chey. Enteric tube extends off the inferior aspect of the image. Cardiac leads. Right IJ central line appears unchanged. Diffuse alveolar opacification in both lungs. Left subclavian central line.    Impression    IMPRESSION:  1.  Largely confluent alveolar opacities throughout both lungs. Opacity is slightly more conspicuous superiorly in both lungs than previous. Edema, hemorrhage and pneumonia are all possible.    REPORT SIGNED BY DR. Prateek Doherty   CT Head w/o Contrast    Narrative    EXAM: CT HEAD W/O CONTRAST  6/15/2024 7:27 PM     HISTORY:  VV ECMO       COMPARISON:  None available.    TECHNIQUE: Using multidetector thin collimation  dual-energy helical  acquisition technique, axial, coronal and sagittal CT images from the  skull base to the vertex were obtained without intravenous contrast.   (topogram) image(s) also obtained and reviewed.    FINDINGS:  No acute intracranial hemorrhage, mass effect, or midline shift. No  acute loss of gray-white matter differentiation in the cerebral  hemispheres. Mild generalized parenchymal volume loss. Ventricles are  proportionate to the cerebral sulci. Clear basal cisterns.    The bony calvaria and the bones of the skull base are normal.  Scattered paranasal sinus mucosal thickening and near complete  opacification of the left sphenoid sinus including frothy debris,  nonspecific in the setting of intubation. Bilateral mastoid and middle  ear effusions. Grossly normal orbits.       Impression    IMPRESSION: No acute intracranial pathology.     CHARLES PERALES MD         SYSTEM ID:  [id number]   CT Chest Abdomen Pelvis w/o Contrast    Narrative    EXAM: CT CHEST ABDOMEN PELVIS W/O CONTRAST 6/15/2024 7:35 PM    HISTORY: 64 years Male s/p VV ECMO    COMPARISON: None.    TECHNIQUE: Helical CT images from the thoracic inlet through the  symphysis pubis were obtained without IV contrast.    FINDINGS:   image(s) are noncontributory.    LINES/TUBES: Endotracheal tube with tip above the chey. Right IJ  ECMO venous cannula a tip terminating adjacent to the brachiocephalic  confluence. Gastric tube tip terminates in the stomach. Left  subclavian vein line terminating adjacent to the brachiocephalic  confluence. Right femoral ECMO venous cannula tip terminates adjacent  to the inferior cavoatrial junction. Romero catheter seen within the  bladder.    CHEST:  LOWER NECK: Unremarkable thyroid. Temperature probe appears coiled in  the neck.    PULMONARY: Extensive layering debris seen within the central airways  and complete occlusion involving the segmental bronchi of the left  lower lobe.. Bronchial wall  thickening, most pronounced in the lower  lobes. Centrilobular and paraseptal emphysematous changes, most  pronounced in the upper lobes. Extensive multifocal consolidative and  patchy groundglass opacities throughout the right and left hemithorax,  most pronounced within the left upper and left lower lobe. There is  near complete collapse/consolidation involving the left lower lobe. No  significant pleural effusions. No appreciable pneumothorax.    CARDIAC: Heart size is borderline enlarged. No pericardial effusion.  Extensive coronary artery calcification/coronary stent.    MEDIASTINUM: The ascending thoracic aorta and main pulmonary artery  are normal in caliber. Common origin of the brachiocephalic artery and  left common carotid artery, normal anatomical variant. There are a few  scattered prominent and right axillary lymph nodes without definite  suspicious features.    ESOPHAGUS: Unremarkable.    ABDOMEN/PELVIS:  HEPATIC: No suspicious focal hepatic lesion.    BILIARY: Gallbladder is moderately distended with hyperdense material  seen within the gallbladder lumen, presumed to represent sludge versus  excreted contrast material. No intra or extra hepatic biliary ductal  dilation.    PANCREAS: Fatty chart for changes of the pancreas. No focal pancreatic  lesion. No pancreatic ductal dilation.    SPLEEN: No splenomegaly. No suspicious lesions or masses.    ADRENALS: Unremarkable.    RENAL: No hydronephrosis. No renal calculi. No definite suspicious  renal masses, however limited on this noncontrast examination..    URINARY BLADDER: Mildly distended with Romero catheter in place.  Hyperdense layering material seen within the bladder lumen.    REPRODUCTIVE: Unremarkable.    GASTROINTESTINAL: Stomach is well distended with ingested material.  Normal caliber small and large bowel. No abnormal wall thickening or  enhancement. Retained contrast material seen within the large bowel  colonic diverticulosis without CT  evidence of diverticulitis. Appendix  is unremarkable.    MESENTERY/PERITONEUM: Trace amount of simple appearing free fluid. No  pneumoperitoneum..    VASCULAR: Mild vascular calcifications abdominal aorta. Vascular  patency cannot be assessed on this noncontrast examination..    LYMPH NODES: No lymphadenopathy.    MUSCULOSKELETAL: Multilevel degenerative changes of the spine. No  acute or suspicious osseous abnormality. Body wall anasarca. Small  fat-containing umbilical hernia.      Impression    IMPRESSION:   1.  Stable position of support devices, as described in body the  report.   2.  Extensive multifocal consolidative and patchy groundglass  opacities throughout the right left hemithorax with complete  collapse/consolidation involving the left lower lobe, can be seen in  the setting of ARDS and/or infection. This also likely represents a  component of aspiration as there is extensive layering debris seen  within the central airways with complete occlusion involving the  segmental bronchi of the left lower lobe.  3.  Layering hyperdense material seen within the bladder lumen, may  represent sequelae of excreted contrast, however hemorrhagic blood  products cannot be excluded. If there is clinical concern, consider  correlation with urinalysis.   4.  Trace amount of simple appearing free fluid in the abdomen and  body wall anasarca, consistent with fluid overload  5.  Temperature probe appears coiled in the neck.    I have personally reviewed the examination and initial interpretation  and I agree with the findings.    EMANUEL CRUZ MD         SYSTEM ID:  [id number]   XR Chest Port 1 View    Narrative    EXAM: XR CHEST PORT 1 VIEW  6/15/2024 8:48 PM     HISTORY:  VV ECMO cannulated       COMPARISON:  6/15/2024    FINDINGS:     AP portable spine radiograph of the chest. The esophageal temperature  probe is likely coiling within the cervical esophagus. Right IJ ECMO  venous cannula tip is in the brachiocephalic  vein. Additional left  subclavian line projects near the brachycephalic confluence. Gastric  tube seen coursing below the diaphragm and out of the field-of-view.  Endotracheal tube seen at the mid thoracic trachea.     Trachea is midline. The cardiac silhouette is completely obscured.  Extensive multifocal patchy consolidative opacities throughout the  right left hemithorax with near complete opacification involving the  left lung field. No appreciable pneumothorax.    No acute osseous abnormality. Visualized upper abdomen is  unremarkable.        Impression    IMPRESSION:   1. Esophageal temperature probe likely coiling within the upper  cervical esophagus, consider repositioning. The remainder of support  devices are in stable position, better appreciated on same-day CT  6/15/2024.  2. Extensive multifocal patchy and consolidative opacities throughout  the right and left hemithorax, left greater than right.    I have personally reviewed the examination and initial interpretation  and I agree with the findings.    EMANUEL CRUZ MD         SYSTEM ID:  [id number]   XR Abdomen Port 1 View    Narrative    EXAM: XR ABDOMEN PORT 1 VIEW  6/15/2024 8:50 PM     HISTORY:  Verify OG placement       TECHNIQUE: Single frontal radiograph of the abdomen    COMPARISON:  CT 6/15/2024    FINDINGS:   AP portable supine radiograph of the abdomen. Gastric tube sidehole  and tip projected over the stomach. Right inferior approach ECMO  cannula tip projects near the inferior cavoatrial junction.    Nonobstructive bowel gas pattern without pneumatosis or portal venous  gas within the field of view.      Impression    IMPRESSION:   Gastric tube sidehole and tip project over the stomach.    I have personally reviewed the examination and initial interpretation  and I agree with the findings.    EMANUEL CRUZ MD         SYSTEM ID:  [id number]   XR Chest Port 1 View    Impression    RESIDENT PRELIMINARY INTERPRETATION  IMPRESSION:  Stable  support devices. Diffuse airspace opacities consistent with  ARDS.   CTA Chest with Contrast    Impression    RESIDENT PRELIMINARY INTERPRETATION  IMPRESSION:  1. Pulmonary emboli involving the right interlobar artery extending  into the right middle and lower lobar arteries as well as the  segmental branches to the right upper lobe. No pulmonary emboli on the  left.  2. No evidence of bronchial artery active extravasation.  3. Stable extensive, multifocal consolidative and groundglass  opacities throughout consistent with ARDS.      [Urgent Result: Pulmonary emboli]    Finding was identified on 6/16/2024 5:18 AM.     Dr. Felder was contacted by Dr. Myers via [facility] at 6/16/2024 5:25  AM and indicated understanding of the urgent finding.

## 2024-06-16 NOTE — PROGRESS NOTES
"Patient is  64 year old male, admitted to unit 4A from an OSH intubated with a 8.0 ETT.  Patient placed on a Drager ventilator with settings from previous hospital, Breath sounds are diminished. Will continue to monitor.  Patient Active Problem List   Diagnosis    Acute respiratory failure (H)    H/O extracorporeal membrane oxygenation treatment     Vent Mode: CMV/AC  (Continuous Mandatory Ventilation/ Assist Control)  Resp Rate (Set): 18 breaths/min  Tidal Volume (Set, mL): 450 mL  PEEP (cm H2O): 18 cmH2O    No results found for: \"PH\"  No results found for: \"PO2\"  No results found for: \"PCO2\"  No results found for: \"HCO3\"                              "

## 2024-06-16 NOTE — PROGRESS NOTES
"Care Management Follow Up    Length of Stay (days): 1    Expected Discharge Date: 06/21/2024     Concerns to be Addressed: initial assessment and support  Patient plan of care discussed at interdisciplinary rounds: No    Anticipated Discharge Disposition:  TBD     Anticipated Discharge Services:  TBD  Anticipated Discharge DME:  TBD    Patient/family educated on Medicare website which has current facility and service quality ratings:  n/a  Education Provided on the Discharge Plan:  n/a  Patient/Family in Agreement with the Plan:  n/a    Referrals Placed by CM/SW:  TBD  Private pay costs discussed: Not applicable    Additional Information:    Stopped by to complete initial assessment with family - per chart patient is \"64 year old male admitted on 6/15/2024. He was cannulated for VV ECMO at [facility] and transferred to [facility] ICU for management and further monitoring. STEMI (inferoposterior infarct ) s/p EDISON in Circumflex artery  on 05/30/2024, came back due to recurrent chest pain with ST changes, he had second stent to be placed in the left PL branch. Then, he got complicated with stent thrombosis.. He was evaluated in ER due to recurrent chest pain and near syncope. Work-up included EKG showing inferior ST elevation with reciprocal changes.\"    Spoke with sister Moustapha who was standing in the gonzalez - wife Elke will be in around 1200 (can come sooner) but she is caring for their special needs child James at home (James is 7 years old). Moustapha shared that Malvin and Elke have 3 children - Anais (adult daughter; lives locally), Xiang (adult son; lives locally) and James (minor daughter; lives with patient and wife). Moustapha shared that family has been visiting at [place] while he was hospitalized there - family was glad that there was another hospital he could go to for more specialized care. Malvin has his mother living in [place] (on HD and may be limited on ability to travel), sister in [place] (transitioning " to PD from HD so is a little more able to travel) and brother in [place]. In order for sister and mother to travel from [place] to [place] - brother in [place] will need to fly to [place] and help them get to [place]. Sister shared that she has not yet updated them (Elke will need to decide about timing there) as it is an involved process to make sure that all of their medical needs are met.     Will return around 1200 to talk further with Elke.    Laura Ingram, [facility] MSW  6/16/2024       Social Work and Care Management Department       SEARCHABLE in [facility] - search SOCIAL WORK       [facility] (0800 - 1630) Saturday and Sunday     Units: 4A Vocera, 4C Vocera, & 4E Vocera        Units: 5A [id number] Vocera, 5A 4913-9810 Vocera , BMT SW 1 BMT SW 2, BMT SW 3 & BMT SW 4  5C Off Service 5401 - 5416  5C Off Service [id number]     Units: 6A Vocera & 6B Vocera      Units: 6C Vocera     Units: 7A Vocera & 7B Vocera      Units: 7C Med Surg 7401 thru 7418 and 7C Med Surg 7502 thru 7521      Unit: [facility] ED Vocera & [facility] Obs Vocera     [facility] ([phone]) Saturday and Sunday      Units: 5 Ortho Vocera, 5 Med Surg Vocera & WB ED Vocera     Units: 6 Med Surg Vocera, 8 Med Surg Vocera, & 10 ICU Vocera      After hours Vocera [facility] and After Hours Vocera [facility]     Saturday & Sunday (1630 - 0000)    Mon-Fri (3675-1715)     [facility] Recognized Holidays  ([id number])    Units: ALL   - see above VOCERA links to units and after hours

## 2024-06-16 NOTE — CONSULTS
SPIRITUAL HEALTH SERVICES Consult Note  [facility] 4A CVICU    Saw pt Malvin Torres per STAT Consult. Family decided they did not want SHS support at this time. SHS remains available via consult per family request.    Jens Miller  Chaplain Resident  Pager [phone]    * SHS remains available 24/7 for emergent requests/referrals, either by having the switchboard page the on-call  or by entering an ASAP/STAT consult in Epic (this will also page the on-call ).*

## 2024-06-16 NOTE — DEATH PRONOUNCEMENT
MD DEATH PRONOUNCEMENT    Called to pronounce Malvin Torres dead.    Physical Exam: Unresponsive to noxious stimuli, Spontaneous respirations absent, Breath sounds absent, Carotid pulse absent, Heart sounds absent, Pupillary light reflex absent, and Corneal blink reflex absent    Patient was pronounced dead at 16:45 PM, 2024.    Preliminary Cause of Death: multiple organ failure    Active Problems:    Acute respiratory failure (H)    H/O extracorporeal membrane oxygenation treatment       Infectious disease present?: NO    Communicable disease present? (examples: HIV, chicken pox, TB, Ebola, CJD) :  NO    Multi-drug resistant organism present? (example: MRSA): NO    Please consider an autopsy if any of the following exist:  NO Unexpected or unexplained death during or following any dental, medical, or surgical diagnostic treatment procedures.   NO Death of mother at or up to seven days after delivery.     NO All  and pediatric deaths.     NO Death where the cause is sufficiently obscure to delay completion of the death certificate.   NO Deaths in which autopsy would confirm a suspected illness/condition that would affect surviving family members or recipients of transplanted organs.     The following deaths must be reported to the 's Office:  NO A death that may be due entirely or in part to any factors other than natural disease (recent surgery, recent trauma, suspected abuse/neglect).   NO A death that may be an accident, suicide, or homicide.     NO Any sudden, unexpected death in which there is no prior history of significant heart disease or any other condition associated with sudden death.   NO A death under suspicious, unusual, or unexpected circumstances.    NO Any death which is apparently due to natural causes but in which the  does not have a personal physician familiar with the patient s medical history, social, or environmental situation or the circumstances of the  terminal event.   NO Any death apparently due to Sudden Infant Death Syndrome.     NO Deaths that occur during, in association with, or as consequences of a diagnostic, therapeutic, or anesthetic procedure.   NO Any death in which a fracture of a major bone has occurred within the past (6) six months.   NO A death of persons note seen by their physician within 120 days of demise.     NO Any death in which the  was an inmate of a public institution or was in the custody of Law Enforcement personnel.   NO  All unexpected deaths of children   NO Solid organ donors   NO Unidentified bodies   NO Deaths of persons whose bodies are to be cremated or otherwise disposed of so that the bodies will later be unavailable for examination;   NO Deaths unattended by a physician outside of a licensed healthcare facility or licensed residential hospice program   NO Deaths occurring within 24 hours of arrival to a health care facility if death is unexpected.    NO Deaths associated with the decedent s employment.   NO Deaths attributed to acts of terrorism.   NO Any death in which there is uncertainty as to whether it is a medical examiner s care should be discussed with the medical investigator.        Body disposition: Autopsy was discussed with family member:  Spouse in person.  Permission for autopsy was declined.    Teresa Castro, DO  General Surgery, PGY-1    Patient seen with SICU fellow, Dr. Faviola Crain and discussed with staff Dr. Jacobs.

## 2024-06-16 NOTE — PROGRESS NOTES
[facility]    ECLS Discontinuation Note:     ECLS was discontinued 6/16/2024 at 1622.    Brennan Vega, RT  ECMO Specialist  6/16/2024 4:23 PM

## 2024-06-16 NOTE — PROGRESS NOTES
[facility]    ECLS Shift Summary:     ECMO Equipment:  Console Serial Number: [id number]  Circuit Lot Number: [id number]  Oxygenator Lot Number: [id number]    Circuit Assessment: Free of fibrin, clot, and air    Arterial ECMO Cannula: 17 Fr in the Right Internal Jugular Vein  Venous ECMO Cannula: 25 Fr in the Right Femoral Vein  Distal Perfusion Catheter:    ECMO Cannula Right internal jugular-Site Assessment: WDL, Sutured, Secure  ECMO Cannula Right femoral vein-Site Assessment: WDL, Sutured  ECMO Cannula Right internal jugular-Site Intervention: No intervention needed  ECMO Cannula Right femoral vein-Site Intervention: No intervention needed    Patient remains on V-V ECMO, all equipment is functioning and alarms are appropriately set. RPM's: 3400 with Blood Flow (Circuit) LPM  Av.4 LPM  Min: 4.16 LPM  Max: 4.64 LPM L/min. Sweep is at 6 LPM and 100 %. Extremities are warm and perfused.     Significant Shift Events: Throughout shift pt experienced decreasing PaO2, and bleeding at arterial cannulation site. After failed Bronchoscopy, pt was taken for CT-scan, revealing internal hemorrhage clouding pleural space. Pt returned to ICU awaiting IR consult. Placed on CRRT.      Vent settings:  Vent Mode: PCV Plus assist  (Pressure Control Ventilation/ Assist Control)  FiO2 (%): 80 %  Resp Rate (Set): 18 breaths/min  Tidal Volume (Set, mL): 450 mL  PEEP (cm H2O): 10 cmH2O  Inspiratory Pressure (cm H2O) (Drager Beti): 25  Resp: 18      Anticoagulation:           Most recent: ACT  (seconds): 140 seconds    Urine output is minimal.  .  Blood loss was minimal externally. Product given included PRBCs x3 unit (2 of 3 units administered by perfusion upon pt arrival to [facility] in transition to floor), Plt x1 unit.     Intake/Output Summary (Last 24 hours) at 2024 0635  Last data filed at 2024 0600  Gross per 24 hour   Intake 1307.8 ml   Output 754 ml   Net 553.8 ml        Labs:  Recent Labs   Lab 06/16/24  0607 06/16/24  0350 06/16/24  0345 06/16/24  0213 06/16/24  0006   PH 7.46*  --  7.49* 7.54* 7.56*   PCO2 48*  --  44 39 36   PO2 42*  --  44* 42* 39*   HCO3 34*  --  34* 33* 32*   O2PER 80 100 100  100  100 100 100       Lab Results   Component Value Date    HGB 8.2 (L) 06/16/2024    PLT 84 (L) 06/16/2024    FIBR 199 06/16/2024    INR 1.43 (H) 06/16/2024    PTT 27 06/16/2024    DD 15.46 (H) 06/16/2024       Plan is for IR consult due to internal hemorrhage. Hgb goalm increased to >10. Continued VV-ECMO cares.    Malvin Sumner RN  ECMO Specialist  6/16/2024 6:35 AM

## 2024-06-16 NOTE — PROGRESS NOTES
D/I: Patient on unit 4A Surgical/Neuro ICU   Neuro- Sedated -4 to -5 RASS. Paralyzed upon arrival to unit. Pupils equal and reactive, brisk.  CV- NSR, MAP goal >65, levophed titrated off now pt. Hypertensive. (140-160's systolic). Generalized edema. Afebrile.  Pulm- ETT, PCV + assist % fio2 25/10. Significant jaime blood suctioned out of ETT, MD attempted bronch, aborted due to diffuse bleeding. Spo2 consistently low to mid 80's, Po2 via ABG's 39-44.   ECMO: Sweep titrated down to 6, Fio2 100%, Flow 4.5-4.6. 2 units RBC's 1 unit platelets given overnight.  GI- OGT to LIS - 600mL out overnight. Small BM upon admission  - Romero. Aneuric. Small of urine noted to be blood tinged  CRRT: 4K bath, goal I=O  Gtts- Prop, fent, Cangrelor, Amio, Levo (off), Heparin (off)  Skin- Genealized bruising.  Pain- CPOT 0  Lines and drains- OGT, ETT, L subclav CVC, L PIV x2, R PIV x1, R rad Urmila, travis, R internal jugular ECMO cannula, R fem ECMO cannula.   See flow sheets for further interventions and assessments.   A: Critical.   P: Continue to monitor pt closely. Notify MD of significant changes

## 2024-06-16 NOTE — PLAN OF CARE
It was noted at 0950 that patient had dilated/fixed pupils. Pupillometer confirmed that pupils were 8mm and fixed. MD was notified and patient was sent for a STAT head CT. This CT confirmed the presence of a left frontoparietal hemorrhage with 2.1cm midline shift and scattered SAHs. Sister at bedside was notified and wife (Elke Torres) and daughter (Anais) were called to come into the hospital to be notified. ECMO attending (Dr. Webster), SICU attending (Dr. Jacobs), SICU fellow and Bedside RN all met with family in the conference room. Patient's condition and Head CT were reviewed. The family understood the severity of his condition (multi-system organ failure with new stroke/herniation). They made the decision to withdraw care and continue with comfort measures. Patient was pre-medicated and the VV ECMO was discontinued. Patient was pronounced dead at 1645 by SICU team. No patient belongings were present in the hospital. Family was present throughout process and able to support patient.

## 2024-06-16 NOTE — PHARMACY-VANCOMYCIN DOSING SERVICE
"Pharmacy Vancomycin Initial Note  Date of Service 2024  Patient's  1960  64 year old male    Indication: Healthcare-Associated Pneumonia    Current estimated CrCl = Estimated Creatinine Clearance: 33 mL/min (A) (based on SCr of 2.9 mg/dL (H)).    Creatinine for last 3 days  6/15/2024:  7:44 PM Creatinine 3.06 mg/dL;  7:44 PM Creatinine 3.06 mg/dL; 10:33 PM Creatinine 3.13 mg/dL  2024:  3:45 AM Creatinine 2.90 mg/dL    Recent Vancomycin Level(s) for last 3 days  No results found for requested labs within last 3 days.      Vancomycin IV Administrations (past 72 hours)        No vancomycin orders with administrations in past 72 hours.                    Nephrotoxins and other renal medications (From now, onward)      Start     Dose/Rate Route Frequency Ordered Stop    24 1000  vancomycin (VANCOCIN) 1,750 mg in sodium chloride 0.9 % 250 mL intermittent infusion        Placed in \"Followed by\" Linked Group    1,750 mg (central catheter)  over 90 Minutes Intravenous EVERY 24 HOURS 24 0833      24 1000  vancomycin (VANCOCIN) 2,000 mg in sodium chloride 0.9 % 250 mL intermittent infusion        Placed in \"Followed by\" Linked Group    2,000 mg (central catheter)  over 90 Minutes Intravenous ONCE 24 0833              Contrast Orders - past 72 hours (72h ago, onward)      Start     Dose/Rate Route Frequency Stop    24 0500  iopamidol (ISOVUE-370) solution 90 mL         90 mL Intravenous ONCE 24 0454                  Plan:  Give vancomycin 2000mg IV x1 followed by 1750mg IV q24h.   Vancomycin monitoring method: Renal Replacement Therapy  Vancomycin therapeutic monitoring goal: 15-20 mg/L  Pharmacy will check vancomycin levels as appropriate in  2-4 days .    Serum creatinine levels will be ordered daily for the first week of therapy and at least twice weekly for subsequent weeks.      Nuris Dalton, PharmD, BCCCP      "

## 2024-06-16 NOTE — PROGRESS NOTES
[facility]    ECLS VV Admission Note:     Date Arrived: 6/15/2024  Time Arrived: 1856    Cannulated at: [facility]  Cannulation Date: 6/15/2024  Cannulation Time: 1733    Pre-cannulation ABG: pH 7.13/PaCO2 119/ PaO2 41/HCO3 39/Lactate 3.1  ABG Time: 1712    Venous (Inflow) Cannula: 17 Fr. In the Right Internal Jugular Vein  Venous (Outflow) Cannula: 25 Fr. In the Right Femoral Vein    ECMO components include:  Cardiohelp Console Serial Number: [id number]   Circuit Lot Number: [id number]  Oxygenator Lot Number: [id number]    Patient transported to [facility] by [facility] with U kwasi BOJORQUEZ perfusionist Quan.  Met patient in the ambulance bay, transported to CT and  without incident. Cannula placement was verified by CXR, cannula position is good (>10 cm apart).    Patient's blood type is pending.    Of note, pt was cannulated with North cannulas, U kwasi BOJORQUEZ Cardiohelp circuit provided by our perfusionist.    RT Tobi  ECMO Specialist  6/15/2024 9:01 PM

## 2024-06-16 NOTE — CONSULTS
Malvin Torres   MRN# [id number]   Age: 64 year old YOB: 1960     Date of Admission:  6/15/2024  Reason for Consultation:     Pulmonary hemorrhage  Requesting Physician:         Sebastien Gibson MD  [facility]  [address] [id number]       Assessment and Plan:    1. Pulmonary hemorrhage/Diffuse alveolar hemorrhage  - Unclear etiology. ? bland hemorrhage due to medications which promote bleeding however TEG appears unremarkable suggesting reasonable platelet function and not in keeping with DIC or consumptive thrombopathy.  Keep platelets> 50 and INR< 1.5     - Autoimmune work up in keeping with Sjogren's which is not typically associated with DAH. Anti-GBM and ANCAs were negative. Moreover, he has since been treated with a extensive regimen of pulse dose steroids with taper, plasma exchange and 1x cyclophosphamide and there has been no improvement.  Rheumatology consult will be a reasonable next step to get their input considering the autoimmune work up and need for additional immunosuppressants    - Bronchoscopy was done tonight to see if therapeutic suctioning of clots will help with respiratory failure and there appears to be profuse oozing indicative of active bleeding and appeared to be coming from both lungs.     - We will start inhaled tranexamic acid (order placed for you)    - When able, consideration for CTA pulmonary/bronchial arteries for possible IR embolization however bleeding appears to be diffuse and not localized and doubt it is going to be high yield but worth checking    2. Acute combined respiratory failure requiring intubation/VV ECMO  Care managed by SICU/ECMO team. Treat as ARDS     3. Renal failure/SHAYNE  - On CRRT    4. CAD/STEMI  Avoid antiplatelets for now (get cardiology's input as to management of antiplatelet therapy in light of recent EDISON        Billing: The patient was seen and examined by me and the findings, assessment, and  plan as documented was explained to the patient/family who expressed understand.   I spent more than 80 minutes face to face and greater than 50% of time was for counseling and coordination of care about the issues above. This was separate from time spent perfroming bronchoscopy.    Miguel Dia MD  Department of Pulmonary, Allergy, Critical Care and Sleep Medicine   [facility]                      HPI/Interval history     Malvin Torres is a 64 year old male with sig h/o for pulmonary emphysema/?COPD,CAD s/p recent stent placement who we are consulted for pulmonary hemorrhage.Patient is currently on VV ECMO/intubated for profound combined respiratory failure in the setting of pulmonary hemorrhage/?diffuse alveolar hemorrhage.    To briefly review, Malvin is a smoker (quit 2023) who was just recently hospitalized at [facility] from 5/28-5/30 for inferior STEMI. He had [place] to proximal LCX and mid LCX. He was discharged home on prasugrel and other heart medications.   He was readmitted on 5/31 with recurrent unstable angina and STEMI at the same hospital. He was taken back to cath lab and found with subacute stent thrombosis of the circumflex stent which was replaced with fresh stents.  He appeared to have developed hemoptysis and SHAYNE soon after procedure and was intubated. Initially he was continued on both aspirin and Brillinta, but later Brillinta was changed to Prasugrel. Due to continued bleeding, only aspirin (300mg) was used.   Pulmonary was consulted and he had several bronchoscopies since admission all of which confirm bright red blood arising from the bronchial tree. DAH was also confirmed based on BALs. A thorough auto-immune work up was done. Anti-GBM and ANCAs were negative. However he did have SLE/Sjogren's positive serologies (+ERIN, +SSA, +SSB) in addition to family history of lupus in sisters. High-dose (pulse) steroids were started. Methylprednisolone 1000mg daily (6/3-6/5), 500mg  daily (-), then 1000mg daily (-) and then 500mg ( - 6/15). We pursued plasma exchange starting 6/10 and he received 4 sessions (last session 6/15). He also received cyclophosphamide 1200mg IV x 1 on  AM due to concern for lupus. TTE on 6/15 confirmed good cardiac function with EF 65-70%  Hematology was consulted and coagulation parameters are not in keeping with DIC. TTP and HIT were considered and [id number]/HIT antibody (pending). His platelet counts were usually hovering around 100. INR levels were never markedly elevated.  His condition continued to deteriorate and he required maximum vent settings FiO2 100%, inhaled flolan, PEEP 18 with paralysis and was transferred here for VV ECMO           Past Medical History:    No past medical history on file.        Past Surgical History:    No past surgical history on file.       Social History:     Social History     Tobacco Use    Smoking status: Former     Current packs/day: 0.00     Types: Cigarettes     Quit date: 2023     Years since quittin.6     Passive exposure: Never    Smokeless tobacco: Never   Substance Use Topics    Alcohol use: Yes     Comment: daily          Family History:   No family history on file.        Allergies:      Allergies   Allergen Reactions    Bupropion Hives     PN: LW Reaction: HIVES    Penicillins Nausea and Vomiting     PN: LW Reaction: Vomiting          Medications:     Current Facility-Administered Medications   Medication Dose Route Frequency Provider Last Rate Last Admin    acetaminophen (TYLENOL) tablet 650 mg  650 mg Oral Q4H PRN Faviola Crain MD        Or    acetaminophen (TYLENOL) oral liquid 650 mg  650 mg Per NG tube Q4H PRN Faviola Crain MD        amiodarone (NEXTERONE) 6 mg/mL in sodium chloride 0.9% in non-PVC container 250 mL MAX concentration CENTRAL line infusion  1 mg/min Intravenous Continuous Carissa Felder MD 10 mL/hr at 24 0000 1 mg/min at 24 0000     amiodarone (NEXTERONE) 6 mg/mL in sodium chloride 0.9% in non-PVC container 250 mL MAX concentration CENTRAL line infusion  0.5 mg/min Intravenous Continuous Carissa Felder MD        calcium gluconate 2 g in  mL intermittent infusion  2 g Intravenous Q8H PRN Ellen Fletcher MD        calcium gluconate 4 g in sodium chloride 0.9 % 100 mL intermittent infusion  4 g Intravenous Q8H PRN Ellen Fletcher MD        cangrelor (KENGREAL) 50 mg in sodium chloride 0.9 % 250 mL infusion  0.75 mcg/kg/min (Dosing Weight) Intravenous Continuous Carissa Felder MD 27.9 mL/hr at 06/16/24 0000 0.75 mcg/kg/min at 06/16/24 0000    chlorhexidine (PERIDEX) 0.12 % solution 15 mL  15 mL Mouth/Throat Q12H Carissa Felder MD   15 mL at 06/15/24 2243    glucose gel 15-30 g  15-30 g Oral Q15 Min PRN Faviola Crain MD        Or    dextrose 50 % injection 25-50 mL  25-50 mL Intravenous Q15 Min PRN Faviola Crain MD        Or    glucagon injection 1 mg  1 mg Subcutaneous Q15 Min PRN Faviola Crain MD        dialysate for CVVHD & CVVHDF (Phoxillum BK4/2.5)  12.5 mL/kg/hr CRRT Continuous Ellen Fletcher MD 1,600 mL/hr at 06/16/24 0057 12.5 mL/kg/hr at 06/16/24 0057    fentaNYL (PF) (SUBLIMAZE) injection 25-50 mcg  25-50 mcg Intravenous Q1H PRN Faviola Crain MD        fentaNYL (SUBLIMAZE) infusion   mcg/hr Intravenous Continuous Carissa Felder MD 2 mL/hr at 06/16/24 0000 100 mcg/hr at 06/16/24 0000    heparin (porcine) 100 unit/mL in 0.45% Sodium Chloride ANTICOAGULANT infusion  10-50 Units/kg/hr (Ideal) Other Continuous Pily Webster MD        heparin ANTICOAGULANT bolus dose from infusion pump 342 Units  5 Units/kg (Ideal) Other Q30 Min PRN Faviola Crain MD        magnesium sulfate 2 g in 50 mL sterile water intermittent infusion  2 g Intravenous Q8H PRN Ellen Fletcher MD        naloxone (NARCAN) injection 0.2 mg  0.2 mg Intravenous Q2 Min  PRN Pily Webster MD        Or    naloxone (NARCAN) injection 0.4 mg  0.4 mg Intravenous Q2 Min PRN Pily Webster MD        Or    naloxone (NARCAN) injection 0.2 mg  0.2 mg Intramuscular Q2 Min PRN Pily Webster MD        Or    naloxone (NARCAN) injection 0.4 mg  0.4 mg Intramuscular Q2 Min PRN Pily Webster MD        No heparin required   Does not apply Continuous PRN Ellen Fletcher MD        norepinephrine (LEVOPHED) 16 mg in  mL infusion MAX CONC CENTRAL LINE  0.01-0.6 mcg/kg/min (Dosing Weight) Intravenous Continuous Faviola Crain MD   Stopped at 06/16/24 0140    ondansetron (ZOFRAN ODT) ODT tab 4 mg  4 mg Oral Q6H PRN Faviola Crain MD        Or    ondansetron (ZOFRAN) injection 4 mg  4 mg Intravenous Q6H PRN Faviola Crain MD        pantoprazole (PROTONIX) 2 mg/mL suspension 40 mg  40 mg Per Feeding Tube QAM Carissa Bello MD        Or    pantoprazole (PROTONIX) IV push injection 40 mg  40 mg Intravenous QA Carissa Bello MD        Patient is already receiving anticoagulation with heparin, enoxaparin (LOVENOX), warfarin (COUMADIN)  or other anticoagulant medication   Does not apply Continuous PRN Faviola Crain MD        polyethylene glycol (MIRALAX) Packet 17 g  17 g Oral Daily PRN Faviola Crain MD        POST-filter replacement solution for CVVHD & CVVHDF (Phoxillum BK4/2.5)   CRRT Continuous Ellen Fletcher  mL/hr at 06/16/24 0058 New Bag at 06/16/24 0058    potassium chloride 20 mEq in 50 mL intermittent infusion  20 mEq Intravenous Q8H PRN Ellen Fletcher MD        PRE-filter replacement solution for CVVHD & CVVHDF (Phoxillum BK4/2.5)  12.5 mL/kg/hr CRRT Continuous Ellen Fletcher MD 1,600 mL/hr at 06/16/24 0058 12.5 mL/kg/hr at 06/16/24 0058    propofol (DIPRIVAN) infusion  5-75 mcg/kg/min (Dosing Weight) Intravenous Continuous Carissa Felder MD  37.2 mL/hr at 06/16/24 0226 50 mcg/kg/min at 06/16/24 0226    sodium phosphate 15 mmol in sodium chloride 0.9 % 250 mL intermittent infusion  15 mmol Intravenous Q8H Ellen Martin MD              Review of Systems:     Unable to obtain         Physical Exam:     Temp:  [93.9  F (34.4  C)-98.2  F (36.8  C)] 98.2  F (36.8  C)  Pulse:  [60-95] 82  BP: (112)/(32) 112/32  MAP:  [60 mmHg-100 mmHg] 82 mmHg  Arterial Line BP: ()/(46-78) 149/62  SpO2:  [85 %-87 %] 85 %  Wt Readings from Last 4 Encounters:   06/15/24 124 kg (273 lb 5.9 oz)   11/16/23 118.3 kg (260 lb 12.8 oz)   11/06/23 117.1 kg (258 lb 3.2 oz)   06/29/23 117.5 kg (259 lb)     Constitutional:   Awake, alert and in no apparent distress     Exam:  Constitutional: Sedated  Head: Normocephalic. No masses, lesions, tenderness or abnormalities  ENT: ET tube in place. Some blood in tube  Cardiovascular: negative, PMI normal. No lifts, heaves, or thrills. RRR. No murmurs, clicks gallops or rub  Respiratory: Synchronous with vent, coarse breath sounds  Gastrointestinal: Abdomen soft  Musculoskeletal: extremities normal- Currently cannulated for ECMO  Skin: no suspicious lesions or rashes  Neurologic: Sedated           Current Laboratory Data:   All laboratory and imaging data reviewed.    Results for orders placed or performed during the hospital encounter of 06/15/24 (from the past 24 hour(s))   CT Head w/o Contrast    Narrative    EXAM: CT HEAD W/O CONTRAST  6/15/2024 7:27 PM     HISTORY:  VV ECMO       COMPARISON:  None available.    TECHNIQUE: Using multidetector thin collimation dual-energy helical  acquisition technique, axial, coronal and sagittal CT images from the  skull base to the vertex were obtained without intravenous contrast.   (topogram) image(s) also obtained and reviewed.    FINDINGS:  No acute intracranial hemorrhage, mass effect, or midline shift. No  acute loss of gray-white matter differentiation in the cerebral  hemispheres.  Mild generalized parenchymal volume loss. Ventricles are  proportionate to the cerebral sulci. Clear basal cisterns.    The bony calvaria and the bones of the skull base are normal.  Scattered paranasal sinus mucosal thickening and near complete  opacification of the left sphenoid sinus including frothy debris,  nonspecific in the setting of intubation. Bilateral mastoid and middle  ear effusions. Grossly normal orbits.       Impression    IMPRESSION: No acute intracranial pathology.     CHARLES PERALES MD         SYSTEM ID:  [id number]   CT Chest Abdomen Pelvis w/o Contrast    Narrative    EXAM: CT CHEST ABDOMEN PELVIS W/O CONTRAST 6/15/2024 7:35 PM    HISTORY: 64 years Male s/p VV ECMO    COMPARISON: None.    TECHNIQUE: Helical CT images from the thoracic inlet through the  symphysis pubis were obtained without IV contrast.    FINDINGS:   image(s) are noncontributory.    LINES/TUBES: Endotracheal tube with tip above the chey. Right IJ  ECMO venous cannula a tip terminating adjacent to the brachiocephalic  confluence. Gastric tube tip terminates in the stomach. Left  subclavian vein line terminating adjacent to the brachiocephalic  confluence. Right femoral ECMO venous cannula tip terminates adjacent  to the inferior cavoatrial junction. Romero catheter seen within the  bladder.    CHEST:  LOWER NECK: Unremarkable thyroid. Temperature probe appears coiled in  the neck.    PULMONARY: Extensive layering debris seen within the central airways  and complete occlusion involving the segmental bronchi of the left  lower lobe.. Bronchial wall thickening, most pronounced in the lower  lobes. Centrilobular and paraseptal emphysematous changes, most  pronounced in the upper lobes. Extensive multifocal consolidative and  patchy groundglass opacities throughout the right and left hemithorax,  most pronounced within the left upper and left lower lobe. There is  near complete collapse/consolidation involving the left lower  lobe. No  significant pleural effusions. No appreciable pneumothorax.    CARDIAC: Heart size is borderline enlarged. No pericardial effusion.  Extensive coronary artery calcification/coronary stent.    MEDIASTINUM: The ascending thoracic aorta and main pulmonary artery  are normal in caliber. Common origin of the brachiocephalic artery and  left common carotid artery, normal anatomical variant. There are a few  scattered prominent and right axillary lymph nodes without definite  suspicious features.    ESOPHAGUS: Unremarkable.    ABDOMEN/PELVIS:  HEPATIC: No suspicious focal hepatic lesion.    BILIARY: Gallbladder is moderately distended with hyperdense material  seen within the gallbladder lumen, presumed to represent sludge versus  excreted contrast material. No intra or extra hepatic biliary ductal  dilation.    PANCREAS: Fatty chart for changes of the pancreas. No focal pancreatic  lesion. No pancreatic ductal dilation.    SPLEEN: No splenomegaly. No suspicious lesions or masses.    ADRENALS: Unremarkable.    RENAL: No hydronephrosis. No renal calculi. No definite suspicious  renal masses, however limited on this noncontrast examination..    URINARY BLADDER: Mildly distended with Romero catheter in place.  Hyperdense layering material seen within the bladder lumen.    REPRODUCTIVE: Unremarkable.    GASTROINTESTINAL: Stomach is well distended with ingested material.  Normal caliber small and large bowel. No abnormal wall thickening or  enhancement. Retained contrast material seen within the large bowel  colonic diverticulosis without CT evidence of diverticulitis. Appendix  is unremarkable.    MESENTERY/PERITONEUM: Trace amount of simple appearing free fluid. No  pneumoperitoneum..    VASCULAR: Mild vascular calcifications abdominal aorta. Vascular  patency cannot be assessed on this noncontrast examination..    LYMPH NODES: No lymphadenopathy.    MUSCULOSKELETAL: Multilevel degenerative changes of the spine.  No  acute or suspicious osseous abnormality. Body wall anasarca. Small  fat-containing umbilical hernia.      Impression    IMPRESSION:   1.  Stable position of support devices, as described in body the  report.   2.  Extensive multifocal consolidative and patchy groundglass  opacities throughout the right left hemithorax with complete  collapse/consolidation involving the left lower lobe, can be seen in  the setting of ARDS and/or infection. This also likely represents a  component of aspiration as there is extensive layering debris seen  within the central airways with complete occlusion involving the  segmental bronchi of the left lower lobe.  3.  Layering hyperdense material seen within the bladder lumen, may  represent sequelae of excreted contrast, however hemorrhagic blood  products cannot be excluded. If there is clinical concern, consider  correlation with urinalysis.   4.  Trace amount of simple appearing free fluid in the abdomen and  body wall anasarca, consistent with fluid overload  5.  Temperature probe appears coiled in the neck.    I have personally reviewed the examination and initial interpretation  and I agree with the findings.    EMANUEL CRUZ MD         SYSTEM ID:  [id number]   Blood gas arterial   Result Value Ref Range    pH Arterial 7.59 (H) 7.35 - 7.45    pCO2 Arterial 29 (L) 35 - 45 mm Hg    pO2 Arterial 82 80 - 105 mm Hg    FIO2 100     Bicarbonate Arterial 28 21 - 28 mmol/L    Base Excess/Deficit Arterial 5.8 (H) -3.0 - 3.0 mmol/L    D[place]'s Test Artline     Oxyhemoglobin Arterial 96 92 - 100 %    O2 Sat, Arterial 98.7 (H) 96.0 - 97.0 %    Narrative    In healthy individuals, oxyhemoglobin (O2Hb) and oxygen saturation (SO2) are approximately equal. In the presence of dyshemoglobins, oxyhemoglobin can be considerably lower than oxygen saturation.   Ionized Calcium   Result Value Ref Range    Calcium Ionized Whole Blood 4.8 4.4 - 5.2 mg/dL   Lactic acid whole blood   Result Value Ref Range     Lactic Acid 5.9 (HH) 0.7 - 2.0 mmol/L   ABO/Rh type and screen    Narrative    The following orders were created for panel order ABO/Rh type and screen.  Procedure                               Abnormality         Status                     ---------                               -----------         ------                     Adult Type and Screen[[id number]]                            Final result                 Please view results for these tests on the individual orders.   Amylase   Result Value Ref Range    Amylase 242 (H) 28 - 100 U/L   Basic metabolic panel   Result Value Ref Range    Sodium 140 135 - 145 mmol/L    Potassium 4.9 3.4 - 5.3 mmol/L    Chloride 98 98 - 107 mmol/L    Carbon Dioxide (CO2) 24 22 - 29 mmol/L    Anion Gap 18 (H) 7 - 15 mmol/L    Urea Nitrogen 80.9 (H) 8.0 - 23.0 mg/dL    Creatinine 3.06 (H) 0.67 - 1.17 mg/dL    GFR Estimate 22 (L) >60 mL/min/1.73m2    Calcium 9.5 8.8 - 10.2 mg/dL    Glucose 183 (H) 70 - 99 mg/dL   INR   Result Value Ref Range    INR 1.66 (H) 0.85 - 1.15   Heparin Unfractionated Anti Xa Level   Result Value Ref Range    Anti Xa Unfractionated Heparin 0.56 For Reference Range, See Comment IU/mL    Narrative    Therapeutic Range: UFH: 0.25-0.50 IU/mL for low intensity dosing,  0.30-0.70 IU/mL for high intensity dosing DVT and PE.  This test is not validated for other direct factor X inhibitors (e.g. rivaroxaban, apixaban, edoxaban, betrixaban, fondaparinux) and should not be used for monitoring of other medications.   Fibrinogen activity   Result Value Ref Range    Fibrinogen Activity 173 170 - 490 mg/dL   Comprehensive metabolic panel   Result Value Ref Range    Sodium 140 135 - 145 mmol/L    Potassium 4.9 3.4 - 5.3 mmol/L    Carbon Dioxide (CO2) 24 22 - 29 mmol/L    Anion Gap 18 (H) 7 - 15 mmol/L    Urea Nitrogen 80.9 (H) 8.0 - 23.0 mg/dL    Creatinine 3.06 (H) 0.67 - 1.17 mg/dL    GFR Estimate 22 (L) >60 mL/min/1.73m2    Calcium 9.5 8.8 - 10.2 mg/dL    Chloride 98  98 - 107 mmol/L    Glucose 183 (H) 70 - 99 mg/dL    Alkaline Phosphatase 75 40 - 150 U/L     (HH) 0 - 45 U/L     (HH) 0 - 70 U/L    Protein Total 4.1 (L) 6.4 - 8.3 g/dL    Albumin 2.8 (L) 3.5 - 5.2 g/dL    Bilirubin Total 1.1 <=1.2 mg/dL   CK total   Result Value Ref Range     (H) 39 - 308 U/L   CBC with platelets differential    Narrative    The following orders were created for panel order CBC with platelets differential.  Procedure                               Abnormality         Status                     ---------                               -----------         ------                     CBC with platelets and d...[[id number]]  Abnormal            Final result               Manual Differential[[id number]]          Abnormal            Preliminary result           Please view results for these tests on the individual orders.   Lipase   Result Value Ref Range    Lipase 105 (H) 13 - 60 U/L   Magnesium   Result Value Ref Range    Magnesium 2.3 1.7 - 2.3 mg/dL   Partial thromboplastin time   Result Value Ref Range    aPTT 177 (HH) 22 - 38 Seconds   Phosphorus   Result Value Ref Range    Phosphorus 6.5 (H) 2.5 - 4.5 mg/dL   Procalcitonin   Result Value Ref Range    Procalcitonin 0.39 <0.50 ng/mL   Troponin T, High Sensitivity   Result Value Ref Range    Troponin T, High Sensitivity 2,607 (HH) <=22 ng/L   Adult Type and Screen   Result Value Ref Range    ABO/RH(D) O POS     Antibody Screen Negative Negative    SPECIMEN EXPIRATION DATE [id number]    CBC with platelets and differential   Result Value Ref Range    WBC Count 15.6 (H) 4.0 - 11.0 10e3/uL    RBC Count 2.59 (L) 4.40 - 5.90 10e6/uL    Hemoglobin 7.8 (L) 13.3 - 17.7 g/dL    Hematocrit 22.7 (L) 40.0 - 53.0 %    MCV 88 78 - 100 fL    MCH 30.1 26.5 - 33.0 pg    MCHC 34.4 31.5 - 36.5 g/dL    RDW 15.3 (H) 10.0 - 15.0 %    Platelet Count 46 (LL) 150 - 450 10e3/uL    % Neutrophils      % Lymphocytes      % Monocytes      % Eosinophils      %  Basophils      % Immature Granulocytes      NRBCs per 100 WBC 6 (H) <1 /100    Absolute Neutrophils      Absolute Lymphocytes      Absolute Monocytes      Absolute Eosinophils      Absolute Basophils      Absolute Immature Granulocytes      Absolute NRBCs 0.9 10e3/uL   Manual Differential   Result Value Ref Range    % Neutrophils 97 %    % Lymphocytes 0 %    % Monocytes 3 %    % Eosinophils 0 %    % Basophils 0 %    NRBCs per 100 WBC 6 (H) <=0 %    Absolute Neutrophils 15.1 (H) 1.6 - 8.3 10e3/uL    Absolute Lymphocytes 0.0 (L) 0.8 - 5.3 10e3/uL    Absolute Monocytes 0.5 0.0 - 1.3 10e3/uL    Absolute Eosinophils 0.0 0.0 - 0.7 10e3/uL    Absolute Basophils 0.0 0.0 - 0.2 10e3/uL    Absolute NRBCs 0.9 (H) <=0.0 10e3/uL    RBC Morphology Confirmed RBC Indices     Platelet Assessment  Automated Count Confirmed. Platelet morphology is normal.     Automated Count Confirmed. Platelet morphology is normal.    Narrative    Sent for Review by Pathologist. Review comments will be entered. Results will be updated after review as applicable.     Methicillin Resist/Sens S. aureus PCR    Specimen: Nares, Bilateral; Swab   Result Value Ref Range    MRSA Target DNA Negative Negative    SA Target DNA Positive     Narrative    [employer]  Xpert SA Nasal Complete assay performed in the [employer]  Dx System is a qualitative in vitro diagnostic test designed for rapid detection of Staphylococcus aureus (SA) and methicillin-resistant Staphylococcus aureus (MRSA) from nasal swabs in patients at risk for nasal colonization. The test utilizes automated real-time polymerase chain reaction (PCR) to detect MRSA/SA DNA. The Xpert SA Nasal Complete assay is intended to aid in the prevention and control of MRSA/SA infections in healthcare settings. The assay is not intended to diagnose, guide or monitor treatment for MRSA/SA infections, or provide results of susceptibility to methicillin. A negative result does not preclude MRSA/SA nasal  colonization.    Blood gas ELS arterial   Result Value Ref Range    pH ELS Arterial 7.62 (HH) 7.35 - 7.45    pCO2 ELS Arterial 25 (L) 35 - 45 mm Hg    pO2 ELS Arterial 423 (H) 80 - 105 mm Hg    Bicarbonate ELS Arterial 26 21 - 28 mmol/L    Base Excess/Deficit Arterial 4.9 (H) -3.0 - 3.0 mmol/L    FIO2 100     Oxyhemoglobin ELS Arterial 99 75 - 100 %    O2 Saturation ELS Arterial >100.0 (H) 96.0 - 97.0 %    Narrative    In healthy individuals, oxyhemoglobin (O2Hb) and oxygen saturation (SO2) are approximately equal. In the presence of dyshemoglobins, oxyhemoglobin can be considerably lower than oxygen saturation.   Blood gas ELS venous   Result Value Ref Range    pH ELS Venous 7.55 (H) 7.32 - 7.43    pCO2 ELS Venous 31 (L) 40 - 50 mm Hg    pO2 ELS Venous 34 25 - 47 mm Hg    Bicarbonate ELS Venous 27 21 - 28 mmol/L    Base Excess/Deficit Venous 4.4 (H) -3.0 - 3.0 mmol/L    FIO2 100     Oxyhemoglobin ELS Venous 73 %    O2 Saturation ELS Venous 74.1 70.0 - 75.0 %    Narrative    In healthy individuals, oxyhemoglobin (O2Hb) and oxygen saturation (SO2) are approximately equal. In the presence of dyshemoglobins, oxyhemoglobin can be considerably lower than oxygen saturation.   Activated clotting time celite, POCT   Result Value Ref Range    Activated Clotting Time (Celite) POCT 220 (H) 74 - 150 seconds   Glucose by meter   Result Value Ref Range    GLUCOSE BY METER POCT 171 (H) 70 - 99 mg/dL   XR Chest Port 1 View    Narrative    EXAM: XR CHEST PORT 1 VIEW  6/15/2024 8:48 PM     HISTORY:  VV ECMO cannulated       COMPARISON:  6/15/2024    FINDINGS:     AP portable spine radiograph of the chest. The esophageal temperature  probe is likely coiling within the cervical esophagus. Right IJ ECMO  venous cannula tip is in the brachiocephalic vein. Additional left  subclavian line projects near the brachycephalic confluence. Gastric  tube seen coursing below the diaphragm and out of the field-of-view.  Endotracheal tube seen at  the mid thoracic trachea.     Trachea is midline. The cardiac silhouette is completely obscured.  Extensive multifocal patchy consolidative opacities throughout the  right left hemithorax with near complete opacification involving the  left lung field. No appreciable pneumothorax.    No acute osseous abnormality. Visualized upper abdomen is  unremarkable.        Impression    IMPRESSION:   1. Esophageal temperature probe likely coiling within the upper  cervical esophagus, consider repositioning. The remainder of support  devices are in stable position, better appreciated on same-day CT  6/15/2024.  2. Extensive multifocal patchy and consolidative opacities throughout  the right and left hemithorax, left greater than right.    I have personally reviewed the examination and initial interpretation  and I agree with the findings.    EMANUEL CRUZ MD         SYSTEM ID:  [id number]   XR Abdomen Port 1 View    Narrative    EXAM: XR ABDOMEN PORT 1 VIEW  6/15/2024 8:50 PM     HISTORY:  Verify OG placement       TECHNIQUE: Single frontal radiograph of the abdomen    COMPARISON:  CT 6/15/2024    FINDINGS:   AP portable supine radiograph of the abdomen. Gastric tube sidehole  and tip projected over the stomach. Right inferior approach ECMO  cannula tip projects near the inferior cavoatrial junction.    Nonobstructive bowel gas pattern without pneumatosis or portal venous  gas within the field of view.      Impression    IMPRESSION:   Gastric tube sidehole and tip project over the stomach.    I have personally reviewed the examination and initial interpretation  and I agree with the findings.    EMANUEL CRUZ MD         SYSTEM ID:  [id number]   Blood gas arterial   Result Value Ref Range    pH Arterial 7.57 (H) 7.35 - 7.45    pCO2 Arterial 32 (L) 35 - 45 mm Hg    pO2 Arterial 46 (L) 80 - 105 mm Hg    FIO2 100     Bicarbonate Arterial 30 (H) 21 - 28 mmol/L    Base Excess/Deficit Arterial 7.4 (H) -3.0 - 3.0 mmol/L    D[place]'s Test  Artline     Oxyhemoglobin Arterial 86 (L) 92 - 100 %    O2 Sat, Arterial 88.1 (L) 96.0 - 97.0 %    Lactic Acid 4.7 (HH) 0.7 - 2.0 mmol/L    Narrative    In healthy individuals, oxyhemoglobin (O2Hb) and oxygen saturation (SO2) are approximately equal. In the presence of dyshemoglobins, oxyhemoglobin can be considerably lower than oxygen saturation.   Ammonia   Result Value Ref Range    Ammonia 59 16 - 60 umol/L   CONDITIONAL Prepare red blood cells (unit)   Result Value Ref Range    Blood Component Type Red Blood Cells     Product Code H9284L93     Unit Status Transfused     Unit Number [id number]     CROSSMATCH Compatible     CODING SYSTEM [id number]     ISSUE DATE AND TIME [id number]     UNIT ABO/RH O+     UNIT TYPE ISBT 5100    CONDITIONAL Prepare pheresed platelets (unit)   Result Value Ref Range    Blood Component Type Platelets     Product Code E1298Y67     Unit Status Transfused     Unit Number [id number]     CODING SYSTEM [id number]     ISSUE DATE AND TIME [id number]     UNIT ABO/RH O+     UNIT TYPE ISBT 5100    CBC with Platelets & Differential    Narrative    The following orders were created for panel order CBC with Platelets & Differential.  Procedure                               Abnormality         Status                     ---------                               -----------         ------                     CBC with platelets and d...[[id number]]  Abnormal            Final result                 Please view results for these tests on the individual orders.   Basic metabolic panel   Result Value Ref Range    Sodium 143 135 - 145 mmol/L    Potassium 4.4 3.4 - 5.3 mmol/L    Chloride 100 98 - 107 mmol/L    Carbon Dioxide (CO2) 27 22 - 29 mmol/L    Anion Gap 16 (H) 7 - 15 mmol/L    Urea Nitrogen 87.9 (H) 8.0 - 23.0 mg/dL    Creatinine 3.13 (H) 0.67 - 1.17 mg/dL    GFR Estimate 21 (L) >60 mL/min/1.73m2    Calcium 9.3 8.8 - 10.2 mg/dL    Glucose 155 (H) 70 - 99 mg/dL   CBC with platelets and  differential   Result Value Ref Range    WBC Count 16.6 (H) 4.0 - 11.0 10e3/uL    RBC Count 2.37 (L) 4.40 - 5.90 10e6/uL    Hemoglobin 7.3 (L) 13.3 - 17.7 g/dL    Hematocrit 20.7 (L) 40.0 - 53.0 %    MCV 87 78 - 100 fL    MCH 30.8 26.5 - 33.0 pg    MCHC 35.3 31.5 - 36.5 g/dL    RDW 15.2 (H) 10.0 - 15.0 %    Platelet Count 71 (L) 150 - 450 10e3/uL    % Neutrophils 94 %    % Lymphocytes 1 %    % Monocytes 2 %    % Eosinophils 0 %    % Basophils 0 %    % Immature Granulocytes 3 %    NRBCs per 100 WBC 4 (H) <1 /100    Absolute Neutrophils 15.7 (H) 1.6 - 8.3 10e3/uL    Absolute Lymphocytes 0.1 (L) 0.8 - 5.3 10e3/uL    Absolute Monocytes 0.4 0.0 - 1.3 10e3/uL    Absolute Eosinophils 0.0 0.0 - 0.7 10e3/uL    Absolute Basophils 0.0 0.0 - 0.2 10e3/uL    Absolute Immature Granulocytes 0.4 <=0.4 10e3/uL    Absolute NRBCs 0.6 10e3/uL   Ionized Calcium   Result Value Ref Range    Calcium Ionized Whole Blood 4.9 4.4 - 5.2 mg/dL   Lactic acid whole blood   Result Value Ref Range    Lactic Acid 4.0 (HH) 0.7 - 2.0 mmol/L   Blood gas arterial   Result Value Ref Range    pH Arterial 7.56 (H) 7.35 - 7.45    pCO2 Arterial 35 35 - 45 mm Hg    pO2 Arterial 41 (L) 80 - 105 mm Hg    FIO2 100     Bicarbonate Arterial 32 (H) 21 - 28 mmol/L    Base Excess/Deficit Arterial 8.7 (H) -3.0 - 3.0 mmol/L    D[place]'s Test Artline     Oxyhemoglobin Arterial 80 (L) 92 - 100 %    O2 Sat, Arterial 82.2 (L) 96.0 - 97.0 %    Narrative    In healthy individuals, oxyhemoglobin (O2Hb) and oxygen saturation (SO2) are approximately equal. In the presence of dyshemoglobins, oxyhemoglobin can be considerably lower than oxygen saturation.   Glucose by meter   Result Value Ref Range    GLUCOSE BY METER POCT 146 (H) 70 - 99 mg/dL   Blood gas arterial   Result Value Ref Range    pH Arterial 7.56 (H) 7.35 - 7.45    pCO2 Arterial 36 35 - 45 mm Hg    pO2 Arterial 39 (LL) 80 - 105 mm Hg    FIO2 100     Bicarbonate Arterial 32 (H) 21 - 28 mmol/L    Base Excess/Deficit  "Arterial 9.1 (H) -3.0 - 3.0 mmol/L    [place]'s Test Artline     Oxyhemoglobin Arterial 78 (L) 92 - 100 %    O2 Sat, Arterial 80.2 (L) 96.0 - 97.0 %    Narrative    In healthy individuals, oxyhemoglobin (O2Hb) and oxygen saturation (SO2) are approximately equal. In the presence of dyshemoglobins, oxyhemoglobin can be considerably lower than oxygen saturation.   Activated clotting time celite, POCT   Result Value Ref Range    Activated Clotting Time (Celite) POCT 153 (H) 74 - 150 seconds   Blood gas arterial   Result Value Ref Range    pH Arterial 7.54 (H) 7.35 - 7.45    pCO2 Arterial 39 35 - 45 mm Hg    pO2 Arterial 42 (L) 80 - 105 mm Hg    FIO2 100     Bicarbonate Arterial 33 (H) 21 - 28 mmol/L    Base Excess/Deficit Arterial 9.5 (H) -3.0 - 3.0 mmol/L    [place]'s Test Artline     Oxyhemoglobin Arterial 79 (L) 92 - 100 %    O2 Sat, Arterial 81.6 (L) 96.0 - 97.0 %    Narrative    In healthy individuals, oxyhemoglobin (O2Hb) and oxygen saturation (SO2) are approximately equal. In the presence of dyshemoglobins, oxyhemoglobin can be considerably lower than oxygen saturation.            Previous Pulmonary Function Testing   No results found for: \"20001\"  No results found for: \"20002\"  No results found for: \"20003\"  No results found for: \"20015\"  No results found for: \"20016\"  No results found for: \"20027\"  No results found for: \"20028\"  No results found for: \"20029\"  No results found for: \"20079\"  No results found for: \"20080\"  No results found for: \"20081\"  No results found for: \"20335\"  No results found for: \"20105\"  No results found for: \"20053\"  No results found for: \"20054\"  No results found for: \"20055\"         Previous Chest Imaging   No images are attached to the encounter.  [unfilled]         Previous Cardiology Imaging   [unfilled]                "

## 2024-06-16 NOTE — PROGRESS NOTES
Admitted/transferred from: [facility]  Reason for admission/transfer: VV ECMO  Patient status upon admission/transfer: unstable  Code Status: Full Code  Admitting team: SICU  Airway: ETT  Lines present: N/S ECMO cannulas, L subclavian CVC, R radial A line, PIV x3, robles, OGT, ETT  Interventions:    Plan: VV ECMO  2 RN skin assessment: completed by Luis Felipe Chopra  Result of skin assessment and interventions/actions: Gen bruising  Height, weight, drug calc weight: done  Patient belongings: None  Family notified of admission: Yes  MDRO education (if applicable): N/A

## 2024-06-16 NOTE — CONSULTS
"INTERVENTIONAL RADIOLOGY CONSULT NOTE    Reason for referral:   \"DAH, active bleeding on bronchoscopy, please evaluate for bronchial artery embolization\"    History:   Malvin Torres is a 64 year old male 5/30/24 w/ STEMI  treated at OSH s/p EDISON in circumflex artery c/b stent thrombosis now transferred and admitted to [facility]  on 6/15/24 for management and monitoring after cannulation for VV ECMO at [facility]. Patient remains on DAPT.    Early this morning patient underwent emergency bronchoscopy due to increasing O2 requirements on VV ECMO. He was found to have significant blood extending up into the distal trachea from both the right and left mainstem bronchi. Given the significant oozing which was presumed diffuse nothing could be done to manage the bleeding bronchoscopically. Diagnosed with pulmonary hemorrhage/diffuse alveolar hemorrhage.     I was initially consulted to weigh in on the role of possible bronchial artery embolization at 0322 this morning at which point a CTA chest had been ordered but not yet reviewed.    Imaging:   CTA chest 6/16/24 - there is diffuse consolidation throughout bilateral lung fields which has significantly progressed as compared to yesterdays CT. There are right lower lobe segmental filling defects. No hypertrophied bronchial artery can be identified on the CT angiogram.    Assessment:   65 yo w/ recent STEMI s/p EDISON to circumflex c/b stent re thrombosis now on VV ECMO and transferred to [facility] for ongoing management. Clinical course complicated overnight by progressively worsening respiratory status requiring escalating ECMO parameters. Imaging and bronchoscopy compatible with diagnosis of diffuse alveolar hemorrhage. The bronchial arteries are not identifiable on the CT angiogram of the chest.    Given the diffuse nature of the bleeding, presumably on the level of the alveoli, bronchial artery embolization would be of doubtful clinical benefit. Specifically, the patient does " not have any known longstanding chronic inflammatory condition in the lung to cause hypertrophy of the bronchial arteries. This is the typical patient we are able to perform the procedure on due to the larger size of the artery and because of the effect the long standing inflammation can have on the friability of the vessel wall. In this patient - we cannot identify even one of the origins of the bronchial arteries on CTA chest. Although this would not necessarily preclude our ability to catheterize the vessel angiographically, from a technical standpoint it would make it very difficult to 1) localize the vessel, and 2) catheterize the vessel. Furthermore, in order to perform embolization safely (I.e., avoid non-target embolization) we need to establish at least a few centimeters of purchase distally into a vessel so that an embolic does not reflux back into the aorta - which we likely would not be able to do given how diminutive the vessels are. Finally, agree with pulmonology recommendations that the treatment should remain centered at treating the possible underlying etiologies responsible for diffuse alveolar hemorrhage and continued conservative/supportive care, which would be more in line with standard of care for this condition than a bronchial artery embolization.     Plan:   - No plan for bronchial artery embolization for reasons detailed above.      Case discussed with ICU Fellow/Resident.      If procedure has been approved, IR charge RN can be contacted at [phone] for estimated time of procedure.     Discussed with David Street M.D.  Interventional Radiology PGY-5  IR pass pager: [phone]

## 2024-06-16 NOTE — PLAN OF CARE
Goal Outcome Evaluation:      Plan of Care Reviewed With: family          Outcome Evaluation: Involved for support and discharge planning as appropriate

## 2024-06-17 LAB
1,3 BETA GLUCAN SER-MCNC: <31 PG/ML
ANCA AB PATTERN SER IF-IMP: NORMAL
ATRIAL RATE - MUSE: 78 BPM
BASOPHILS # BLD MANUAL: 0 10E3/UL (ref 0–0.2)
BASOPHILS NFR BLD MANUAL: 0 %
C-ANCA TITR SER IF: NORMAL {TITER}
C3 SERPL-MCNC: 60 MG/DL (ref 81–157)
C4 SERPL-MCNC: 13 MG/DL (ref 13–39)
CARDIOLIPIN IGG SER IA-ACNC: <2 GPL-U/ML
CARDIOLIPIN IGG SER IA-ACNC: NEGATIVE
CARDIOLIPIN IGM SER IA-ACNC: <2 MPL-U/ML
CARDIOLIPIN IGM SER IA-ACNC: NEGATIVE
DIASTOLIC BLOOD PRESSURE - MUSE: NORMAL MMHG
DRVVT SCREEN RATIO: 0.97
DSDNA AB SER-ACNC: 1.2 IU/ML
EOSINOPHIL # BLD MANUAL: 0 10E3/UL (ref 0–0.7)
EOSINOPHIL NFR BLD MANUAL: 0 %
GALACTOMANNAN AG SERPL QL IA: NEGATIVE
GALACTOMANNAN AG SPEC IA-ACNC: 0.03
INR PPP: 1.43 (ref 0.85–1.15)
INTERPRETATION ECG - MUSE: NORMAL
INTERPRETATION TEGPIA: NORMAL
LA PPP-IMP: NEGATIVE
LUPUS INTERPRETATION: ABNORMAL
LYMPHOCYTES # BLD MANUAL: 0 10E3/UL (ref 0.8–5.3)
LYMPHOCYTES NFR BLD MANUAL: 0 %
MONOCYTES # BLD MANUAL: 0.5 10E3/UL (ref 0–1.3)
MONOCYTES NFR BLD MANUAL: 3 %
MYELOPEROXIDASE AB SER IA-ACNC: <0.3 U/ML
MYELOPEROXIDASE AB SER IA-ACNC: NEGATIVE
NEUTROPHILS # BLD MANUAL: 15.1 10E3/UL (ref 1.6–8.3)
NEUTROPHILS NFR BLD MANUAL: 97 %
NRBC # BLD AUTO: 0.9 10E3/UL
NRBC BLD MANUAL-RTO: 6 %
OBSERVATION IMP: NEGATIVE
P AXIS - MUSE: 69 DEGREES
PA AA BLD-ACNC: 100 %
PA ADP BLD-ACNC: 100 %
PATH REPORT.COMMENTS IMP SPEC: ABNORMAL
PATH REPORT.COMMENTS IMP SPEC: NORMAL
PATH REV: ABNORMAL
PLAT MORPH BLD: ABNORMAL
PR INTERVAL - MUSE: 156 MS
PROTEINASE3 AB SER IA-ACNC: <1 U/ML
PROTEINASE3 AB SER IA-ACNC: NEGATIVE
PTT RATIO: 0.94
QRS DURATION - MUSE: 78 MS
QT - MUSE: 364 MS
QTC - MUSE: 414 MS
R AXIS - MUSE: 46 DEGREES
RBC MORPH BLD: ABNORMAL
SYSTOLIC BLOOD PRESSURE - MUSE: NORMAL MMHG
T AXIS - MUSE: 6 DEGREES
THROMBIN TIME: 18.5 SECONDS (ref 13–19)
U1 SNRNP IGG SER IA-ACNC: 1.2 U/ML
U1 SNRNP IGG SER IA-ACNC: NEGATIVE
VENTRICULAR RATE- MUSE: 78 BPM

## 2024-06-18 LAB
BACTERIA SPT CULT: ABNORMAL
BM IGG SER IF-ACNC: 0 AU/ML
GRAM STAIN RESULT: ABNORMAL
GRAM STAIN RESULT: ABNORMAL

## 2024-06-20 LAB
ANA PAT SER IF-IMP: ABNORMAL
ANA SER QL IF: POSITIVE
ANA TITR SER IF: ABNORMAL {TITER}
BACTERIA BLD CULT: NO GROWTH
BACTERIA BLD CULT: NO GROWTH

## 2024-06-23 DIAGNOSIS — I10 ESSENTIAL HYPERTENSION: ICD-10-CM

## 2024-06-24 RX ORDER — AMLODIPINE BESYLATE 10 MG/1
10 TABLET ORAL DAILY
Qty: 90 TABLET | Refills: 0 | Status: SHIPPED | OUTPATIENT
Start: 2024-06-24

## 2024-07-06 DIAGNOSIS — I10 ESSENTIAL HYPERTENSION: ICD-10-CM

## 2024-07-08 RX ORDER — ATORVASTATIN CALCIUM 80 MG/1
80 TABLET, FILM COATED ORAL DAILY
Qty: 30 TABLET | Refills: 11 | OUTPATIENT
Start: 2024-07-08

## 2024-07-08 NOTE — PROGRESS NOTES
SICU PROGRESS  NOTE    Primary Team: SICU ECMO  Reason for Critical Care Admission: VV ECMO  Admitting Physician: Pily Webster MD  Date of Admission:  6/15/2024      Assessment: Critical Care   Malvin Torres is a 64 year old male with hx of smoking, recent inferior STEMI (5/2024) s/p EDISON to Lcx c/b in stent thrombosis s/p repeat PCI after which he developed hemoptysis and SHAYNE, alveolar hemorrhage resulting in acute hypoxemix/hypercarbic respiratory failure requiring mechanical ventilation and ultimately VV ECMO for which he was transferred to [facility] on 6/15.     Changes:    Addendum: CT head consistent with devastating neurologic injury. Large L IPH with 2.1 midline shift, subfalcine and uncal herniation, scattered SAH. Discussed with family at bedside by ICU staff.       - IP Consult- unavailable today. Thoracic surgery consult  - Hematology consult  - Transfusion medicine consult  - Rheumatology consult  - TEG   - Continue CRRT, increase UF  - Increase TXA nebs to q6  - Formal ECHO, follow with NILAM  - Start Epinephrine and vasopressin  - RUQ US  - Fungitell, galactomannan, RVP, MRSA swab  - repeat EKG  - add  bladder irrigation  - Start vancomycin and Zosyn  - CT head STAT   - Add flowtrack  - 3 PRBC today      Plan: Critical Care   Neuro/ pain/ sedation:  #Acute pain  #Encephalopathy  - Monitor neurological status. Notify provider for any acute changes in exam  - pain/sedation: propofol, fentanyl gtt  - Off paralytics overnight  - Daily sedation vacation  - RAAS goal -3 to -4, given refractory hypoxia  -  Called to the bedside to evaluate dilated fixed pupils at 0950. STAT head CT     Pulmonary:  # Pulmonary hemorrhage  #ARDS s/p VV ECMO  - Ventilator settings: No data recorded  - VV ECMO 4.5L/3400/6/100  - Post oxyhemoglobin >400, Delta P 25. Off anticoagulation on the circuit  - PaO2 40s, currently at limit of RPMs we can increase without chattering  - increase hemoglobin goal > 10-12  -  Bronchoscopy overnight encountered oozing from both lungs, visualization difficult and procedure stopped  - IP consult however not available on the weekend.Thoracic surgery     Cardiovascular:  # Recent STEMI s/p EDISON c/b occlusion  # Mitral regurgitation  # Shocked, mixed  - Levophed on admission, weaned down  - Goal MAP > 65  - Normal ECHO at OSH yesterday. Overnight,bedside cardiology ECHO consistent with moderate to severe MR. Repeat ECHO today  - NILAM  - given elevated LFTs, concerning for hepatic congestion  - Start inotropic support- Epinephrine given hypotension. Add vasopressin  - Cardiology consulted, appreciate recs  - On cangrelor, no aspirin    GI/Nutrition:  # ileus  #Ischemic hepatopathy vs hepatic congestion  - Diet:  NPO  - Tube feeds when appropriate  - OG to LIS for now  - nutrition consult  - RUQ US     Renal/ Fluid Balance:   # Lactic acidosis  #volume overload  #Hematuria  - Robles for strict I/O  - ICU electrolyte replacement protocols  - CRRT, increase UF  - Hematuria, robles clotted off. Insert 3 way robles and bladder irrigation.   Endocrine:  # Stress hyperglycemia  - FSG, ISS q4h, goal blood glucose < 180    ID:  #HCAP  - Concern for sepsis  - Repeat BC, urinalysis, sputum, BAL if possible, aspergillus, galactomannan, RVP, COVID swab  - Empiric Vancomycin and Zosyn for pneumonia      Hematology:  #Coagulopathy  #Pulmonary hemorrhage  #Anemia of critical illness  #Acute blood loss anemia  - Goal Hgb >10-12 for refractory hypoxia  - 3 PRBC given thisam, 1 PRBC overnight with 1 platelet  - Quantra consistent with antiplatelet therapy.   - TEG peding  - C3/C4, RNP antibody, DNA double stranded antibodies, ERIN, cardiolipin, RF, ANCA, CRP, ESR pending.   - Hematology, transfusion medicine and rheumatology consults.   - Monitor for increased bleeding with cangrelor gtt and heparin for ECMO circuit    MSK:   # deconditioning due to critical illness  - PT and OT on hold     Lines/ tubes/ drains:  Robles  "Catheter: Not present  Lines: CVC, arterial line, ECMO cannula x2 RIJ and R fem, OG, ETT, Romero    Prophylaxis:  - DVT Prophylaxis: heparin  - PUD Prophylaxis: protonix    Code Status:  Full    Disposition:  - SICU  CC time 80 minutes       Clinically Significant Risk Factors Present on Admission        [facility]  CC time 60 minutes exclusive of procedures      Securely message with [employer] (more info)  Text page via [employer] Paging/Directory     ______________________________________________________________________  SUBJECTIVE  Events reviewed. Hypertensive this am. Followed by hypotension. Sedated. Unable to obtain ROS        Physical Exam   BP (!) 112/32   Pulse 87   Temp 98.6  F (37  C) (Oral)   Resp (!) 0   Ht 1.727 m (5' 7.99\")   Wt 124 kg (273 lb 5.9 oz)   SpO2 (!) 87%   BMI 41.58 kg/m      General: intubated, sedated  HEENT: ETT, OG secure in place. ECMO cannula secure right neck.   Neuro: sedated, not responsive  CV: regular rate  Pulm: intubated, mechanical ventilation, equal chest rise. No chugging on ECMO circuit.   Abd: soft, mildly distended  : Romero in place, very little UOP, gross hematuria  Extremities: wwp      Data   I reviewed all medications, new labs and imaging results over the last 24 hours.  Arterial Blood Gases   No lab results found in last 7 days.      Complete Blood Count   No lab results found in last 7 days.      Basic Metabolic Panel  No lab results found in last 7 days.      Liver Function Tests  No lab results found in last 7 days.      Pancreatic Enzymes  No lab results found in last 7 days.      Coagulation Profile  No lab results found in last 7 days.      IMAGING:  No results found for this or any previous visit (from the past 24 hour(s)).      "